# Patient Record
Sex: FEMALE | Race: WHITE | NOT HISPANIC OR LATINO | Employment: OTHER | URBAN - NONMETROPOLITAN AREA
[De-identification: names, ages, dates, MRNs, and addresses within clinical notes are randomized per-mention and may not be internally consistent; named-entity substitution may affect disease eponyms.]

---

## 2017-09-18 ENCOUNTER — TRANSCRIBE ORDERS (OUTPATIENT)
Dept: ADMINISTRATIVE | Facility: HOSPITAL | Age: 57
End: 2017-09-18

## 2017-09-18 DIAGNOSIS — Z12.31 ENCOUNTER FOR SCREENING MAMMOGRAM FOR MALIGNANT NEOPLASM OF BREAST: Primary | ICD-10-CM

## 2017-09-22 ENCOUNTER — HOSPITAL ENCOUNTER (OUTPATIENT)
Dept: MAMMOGRAPHY | Facility: HOSPITAL | Age: 57
Discharge: HOME | End: 2017-09-22
Attending: OBSTETRICS & GYNECOLOGY | Admitting: OBSTETRICS & GYNECOLOGY

## 2017-09-22 DIAGNOSIS — Z12.31 ENCOUNTER FOR SCREENING MAMMOGRAM FOR MALIGNANT NEOPLASM OF BREAST: ICD-10-CM

## 2017-09-22 PROCEDURE — 77063 BREAST TOMOSYNTHESIS BI: CPT

## 2017-09-22 PROCEDURE — G0202 SCR MAMMO BI INCL CAD: HCPCS

## 2018-07-30 ENCOUNTER — TRANSCRIBE ORDERS (OUTPATIENT)
Dept: ADMINISTRATIVE | Facility: HOSPITAL | Age: 58
End: 2018-07-30

## 2018-07-30 DIAGNOSIS — Z12.31 ENCOUNTER FOR SCREENING MAMMOGRAM FOR MALIGNANT NEOPLASM OF BREAST: Primary | ICD-10-CM

## 2018-09-28 ENCOUNTER — APPOINTMENT (OUTPATIENT)
Dept: MAMMOGRAPHY | Facility: HOSPITAL | Age: 58
End: 2018-09-28
Attending: OBSTETRICS & GYNECOLOGY

## 2018-09-28 ENCOUNTER — HOSPITAL ENCOUNTER (OUTPATIENT)
Dept: MAMMOGRAPHY | Facility: HOSPITAL | Age: 58
Discharge: HOME | End: 2018-09-28
Attending: OBSTETRICS & GYNECOLOGY | Admitting: OBSTETRICS & GYNECOLOGY

## 2018-09-28 DIAGNOSIS — Z12.31 ENCOUNTER FOR SCREENING MAMMOGRAM FOR MALIGNANT NEOPLASM OF BREAST: ICD-10-CM

## 2018-09-28 PROCEDURE — 77063 BREAST TOMOSYNTHESIS BI: CPT

## 2018-09-28 PROCEDURE — 77067 SCR MAMMO BI INCL CAD: CPT

## 2019-06-17 ENCOUNTER — TRANSCRIBE ORDERS (OUTPATIENT)
Dept: ADMINISTRATIVE | Facility: HOSPITAL | Age: 59
End: 2019-06-17

## 2019-06-17 DIAGNOSIS — Z12.39 SCREENING BREAST EXAMINATION: Primary | ICD-10-CM

## 2019-07-29 ENCOUNTER — TRANSCRIBE ORDERS (OUTPATIENT)
Dept: ADMINISTRATIVE | Facility: HOSPITAL | Age: 59
End: 2019-07-29

## 2019-07-29 DIAGNOSIS — Z13.820 SCREENING FOR OSTEOPOROSIS: Primary | ICD-10-CM

## 2019-10-04 ENCOUNTER — APPOINTMENT (OUTPATIENT)
Dept: MAMMOGRAPHY | Facility: HOSPITAL | Age: 59
End: 2019-10-04

## 2019-10-04 ENCOUNTER — HOSPITAL ENCOUNTER (OUTPATIENT)
Dept: MAMMOGRAPHY | Facility: HOSPITAL | Age: 59
Discharge: HOME | End: 2019-10-04
Admitting: OBSTETRICS & GYNECOLOGY

## 2019-10-04 DIAGNOSIS — Z12.39 SCREENING BREAST EXAMINATION: ICD-10-CM

## 2019-10-04 PROCEDURE — 77067 SCR MAMMO BI INCL CAD: CPT

## 2020-10-20 ENCOUNTER — TRANSCRIBE ORDERS (OUTPATIENT)
Dept: ADMINISTRATIVE | Facility: HOSPITAL | Age: 60
End: 2020-10-20

## 2020-10-20 DIAGNOSIS — Z12.31 SCREENING MAMMOGRAM, ENCOUNTER FOR: Primary | ICD-10-CM

## 2020-10-23 ENCOUNTER — OFFICE VISIT (OUTPATIENT)
Dept: OBSTETRICS AND GYNECOLOGY | Facility: CLINIC | Age: 60
End: 2020-10-23

## 2020-10-23 VITALS
WEIGHT: 180 LBS | DIASTOLIC BLOOD PRESSURE: 80 MMHG | BODY MASS INDEX: 29.99 KG/M2 | HEIGHT: 65 IN | SYSTOLIC BLOOD PRESSURE: 130 MMHG

## 2020-10-23 DIAGNOSIS — Z78.9 NONSMOKER: ICD-10-CM

## 2020-10-23 DIAGNOSIS — N39.3 SUI (STRESS URINARY INCONTINENCE, FEMALE): ICD-10-CM

## 2020-10-23 DIAGNOSIS — Z01.419 ENCOUNTER FOR GYNECOLOGICAL EXAMINATION WITHOUT ABNORMAL FINDING: Primary | ICD-10-CM

## 2020-10-23 DIAGNOSIS — E66.9 OBESITY (BMI 30-39.9): ICD-10-CM

## 2020-10-23 DIAGNOSIS — Z13.820 OSTEOPOROSIS SCREENING: ICD-10-CM

## 2020-10-23 DIAGNOSIS — N95.0 PMB (POSTMENOPAUSAL BLEEDING): ICD-10-CM

## 2020-10-23 DIAGNOSIS — Z12.31 BREAST CANCER SCREENING BY MAMMOGRAM: ICD-10-CM

## 2020-10-23 PROCEDURE — 99386 PREV VISIT NEW AGE 40-64: CPT | Performed by: OBSTETRICS & GYNECOLOGY

## 2020-10-23 PROCEDURE — G0123 SCREEN CERV/VAG THIN LAYER: HCPCS | Performed by: OBSTETRICS & GYNECOLOGY

## 2020-10-23 RX ORDER — DIPHENHYDRAMINE HCL 25 MG
25 CAPSULE ORAL AS NEEDED
COMMUNITY

## 2020-10-23 RX ORDER — MECLIZINE HYDROCHLORIDE 25 MG/1
25 TABLET ORAL
COMMUNITY
End: 2022-08-25 | Stop reason: SDUPTHER

## 2020-10-23 RX ORDER — IBUPROFEN 200 MG
400 TABLET ORAL
COMMUNITY
End: 2021-07-22

## 2020-10-23 RX ORDER — HYDROCHLOROTHIAZIDE 12.5 MG/1
CAPSULE, GELATIN COATED ORAL
COMMUNITY
Start: 2020-05-26 | End: 2022-04-19 | Stop reason: SDUPTHER

## 2020-10-23 RX ORDER — LORAZEPAM 1 MG/1
TABLET ORAL
COMMUNITY
Start: 2020-08-31 | End: 2021-04-20 | Stop reason: SDUPTHER

## 2020-10-23 RX ORDER — CYCLOBENZAPRINE HCL 5 MG
5 TABLET ORAL
COMMUNITY
Start: 2019-11-19 | End: 2020-11-19

## 2020-10-23 RX ORDER — CHOLECALCIFEROL (VITAMIN D3) 125 MCG
10 CAPSULE ORAL NIGHTLY
COMMUNITY

## 2020-10-23 RX ORDER — CETIRIZINE HYDROCHLORIDE 10 MG/1
10 TABLET ORAL DAILY
COMMUNITY

## 2020-10-23 NOTE — PROGRESS NOTES
Subjective      Radha Wolff is a 59 y.o. female who presents for her routine annual exam. Patient has never actually made it 12 months without bleeding - she says that every time she hits the 11 month owen, she has had a day of bleeding.  Patient reports that [facility] had done two biopsies on her.  Last month, patient had one day of dark brown discharge, again at 11 months from the last episode of bleeding.  No hot flashes/night sweats.    Regular self breast exam: yes  History of abnormal Pap smear: no  History of abnormal mammogram: no  Exercise: frequently    Menstrual History:  OB History        1    Para   0    Term   0            AB   1    Living           SAB        TAB        Ectopic        Molar        Multiple        Live Births                   No LMP recorded. Patient is postmenopausal.       The following portions of the patient's history were reviewed and updated as appropriate: allergies, current medications, past family history, past medical history, past social history, past surgical history and problem list.    heterosexual    Family History  Family History   Problem Relation Age of Onset   • Lung cancer Mother    • Lung cancer Father    • Obesity Brother    • Heart disease Brother    • Arthritis Brother    • No Known Problems Maternal Grandmother    • No Known Problems Paternal Grandmother    • No Known Problems Maternal Aunt    • No Known Problems Paternal Aunt    • BRCA 1/2 Neg Hx    • Breast cancer Neg Hx    • Colon cancer Neg Hx    • Endometrial cancer Neg Hx    • Ovarian cancer Neg Hx        Review of Systems  Review of Systems   Constitutional: Negative for activity change and unexpected weight loss (Pt has intentionally lost weight since retiring from her job).   HENT: Negative for congestion.    Respiratory: Negative for shortness of breath.    Cardiovascular: Negative for chest pain.   Gastrointestinal: Positive for constipation and diarrhea. Negative for abdominal pain  "and blood in stool.   Endocrine: Positive for cold intolerance (cold natured). Negative for heat intolerance.   Genitourinary: Positive for urinary incontinence (BRET) and vaginal bleeding (dark brown discharge one day last month). Negative for breast pain, decreased libido, difficulty urinating, dyspareunia, pelvic pain, vaginal discharge and vaginal pain.   Musculoskeletal: Positive for arthralgias. Negative for back pain, neck pain and neck stiffness.   Skin: Negative for rash.   Neurological: Positive for dizziness (inner ear, chronic intermittent). Negative for headache.   Psychiatric/Behavioral: Positive for sleep disturbance ( ativan and melatonin). Negative for depressed mood. The patient is not nervous/anxious.              Objective        /80   Ht 165.1 cm (65\")   Wt 81.6 kg (180 lb)   Breastfeeding No   BMI 29.95 kg/m²   Physical Exam  Vitals signs and nursing note reviewed. Exam conducted with a chaperone present.   Constitutional:       General: She is not in acute distress.     Appearance: She is well-developed.   HENT:      Head: Normocephalic and atraumatic.   Neck:      Musculoskeletal: Normal range of motion and neck supple.   Cardiovascular:      Rate and Rhythm: Normal rate and regular rhythm.      Heart sounds: No murmur.   Pulmonary:      Effort: Pulmonary effort is normal.      Breath sounds: Normal breath sounds.   Chest:      Breasts:         Right: No inverted nipple or mass.         Left: No inverted nipple or mass.   Abdominal:      General: There is no distension.      Palpations: Abdomen is soft.      Tenderness: There is no abdominal tenderness.   Genitourinary:     General: Normal vulva.      Exam position: Lithotomy position.      Labia:         Right: No tenderness or lesion.         Left: No tenderness or lesion.       Urethra: No prolapse.      Vagina: Normal. No vaginal discharge, tenderness or bleeding.      Cervix: No cervical motion tenderness, discharge or " friability.      Adnexa:         Right: No tenderness or fullness.          Left: No tenderness or fullness.        Rectum: No external hemorrhoid or internal hemorrhoid. Normal anal tone.   Musculoskeletal: Normal range of motion.   Skin:     General: Skin is warm and dry.   Neurological:      Mental Status: She is alert and oriented to person, place, and time.   Psychiatric:         Behavior: Behavior normal.         Judgment: Judgment normal.               Assessment  & Plan    Diagnoses and all orders for this visit:    1. Encounter for gynecological examination without abnormal finding (Primary): Exam unremarkable.  Patient reports regular SBE and regular exercise.  She has no h/o abnormal PAP or mammogram.  Mammogram and DEXA both ordered.  New PAP collected.  -     Liquid-based Pap Smear, Screening  -     HPV DNA Probe, Direct - ThinPrep Vial, Cervix    2. Obesity (BMI 30-39.9): Patient reports regular exercise.    3. Nonsmoker    4. BRET (stress urinary incontinence, female)  Comments:  Not bothered enough that she wants to do PT    5. Osteoporosis screening  -     DEXA Bone Density Axial; Future    6. Breast cancer screening by mammogram  -     Mammo Screening Bilateral With CAD; Future    7. PMB (postmenopausal bleeding): RTO in 2-3 weeks with pelvic u/s.  Possible biopsy vs D&C at that time, depending on u/s findings  -     Estradiol  -     Follicle Stimulating Hormone  -     Luteinizing Hormone  -     Progesterone        This note was electronically signed.    Kalani Newsome MD  10/23/2020  10:13 CDT

## 2020-10-24 LAB
ESTRADIOL SERPL-MCNC: <5 PG/ML
FSH SERPL-ACNC: 99.9 MIU/ML
LH SERPL-ACNC: 53.6 MIU/ML
PROGEST SERPL-MCNC: <0.1 NG/ML

## 2020-10-26 PROCEDURE — 87624 HPV HI-RISK TYP POOLED RSLT: CPT | Performed by: OBSTETRICS & GYNECOLOGY

## 2020-10-27 ENCOUNTER — TELEPHONE (OUTPATIENT)
Dept: OBSTETRICS AND GYNECOLOGY | Facility: CLINIC | Age: 60
End: 2020-10-27

## 2020-10-28 LAB
HPV I/H RISK 4 DNA CVX QL PROBE+SIG AMP: NOT DETECTED
LAB AP CASE REPORT: NORMAL
LAB AP GYN ADDITIONAL INFORMATION: NORMAL
PATH INTERP SPEC-IMP: NORMAL
STAT OF ADQ CVX/VAG CYTO-IMP: NORMAL

## 2020-10-28 NOTE — TELEPHONE ENCOUNTER
Informed pt of labs consistent with menopausal state, but pt wants to know Pap results. Please review.

## 2020-10-28 NOTE — TELEPHONE ENCOUNTER
It just came back this morning.  You can reassure her that PAP was normal, with a negative HPV.  Thx

## 2020-10-30 ENCOUNTER — APPOINTMENT (OUTPATIENT)
Dept: BONE DENSITY | Facility: HOSPITAL | Age: 60
End: 2020-10-30

## 2020-10-30 ENCOUNTER — APPOINTMENT (OUTPATIENT)
Dept: MAMMOGRAPHY | Facility: HOSPITAL | Age: 60
End: 2020-10-30

## 2020-11-05 ENCOUNTER — HOSPITAL ENCOUNTER (OUTPATIENT)
Dept: BONE DENSITY | Facility: HOSPITAL | Age: 60
Discharge: HOME | End: 2020-11-05

## 2020-11-05 ENCOUNTER — HOSPITAL ENCOUNTER (OUTPATIENT)
Dept: MAMMOGRAPHY | Facility: HOSPITAL | Age: 60
Discharge: HOME | End: 2020-11-05

## 2020-11-05 DIAGNOSIS — Z13.820 OSTEOPOROSIS SCREENING: ICD-10-CM

## 2020-11-05 PROCEDURE — 77063 BREAST TOMOSYNTHESIS BI: CPT

## 2020-11-05 PROCEDURE — 77080 DXA BONE DENSITY AXIAL: CPT

## 2020-11-05 PROCEDURE — 77067 SCR MAMMO BI INCL CAD: CPT

## 2020-11-08 DIAGNOSIS — R92.8 ABNORMAL MAMMOGRAM: Primary | ICD-10-CM

## 2020-11-09 ENCOUNTER — HOSPITAL ENCOUNTER (OUTPATIENT)
Dept: MAMMOGRAPHY | Facility: HOSPITAL | Age: 60
Discharge: HOME | End: 2020-11-09
Admitting: OBSTETRICS & GYNECOLOGY

## 2020-11-09 DIAGNOSIS — R92.8 ABNORMAL MAMMOGRAM: ICD-10-CM

## 2020-11-09 PROCEDURE — 77065 DX MAMMO INCL CAD UNI: CPT

## 2020-11-09 PROCEDURE — G0279 TOMOSYNTHESIS, MAMMO: HCPCS

## 2020-11-11 ENCOUNTER — OFFICE VISIT (OUTPATIENT)
Dept: OBSTETRICS AND GYNECOLOGY | Facility: CLINIC | Age: 60
End: 2020-11-11

## 2020-11-11 VITALS
BODY MASS INDEX: 31.16 KG/M2 | WEIGHT: 187 LBS | DIASTOLIC BLOOD PRESSURE: 84 MMHG | HEIGHT: 65 IN | SYSTOLIC BLOOD PRESSURE: 118 MMHG

## 2020-11-11 DIAGNOSIS — R92.8 ABNORMAL MAMMOGRAM: Primary | ICD-10-CM

## 2020-11-11 DIAGNOSIS — N95.0 PMB (POSTMENOPAUSAL BLEEDING): Primary | ICD-10-CM

## 2020-11-11 DIAGNOSIS — Z78.9 NONSMOKER: ICD-10-CM

## 2020-11-11 DIAGNOSIS — R92.1 BREAST CALCIFICATION SEEN ON MAMMOGRAM: ICD-10-CM

## 2020-11-11 DIAGNOSIS — R93.89 THICKENED ENDOMETRIUM: ICD-10-CM

## 2020-11-11 PROCEDURE — 58100 BIOPSY OF UTERUS LINING: CPT | Performed by: OBSTETRICS & GYNECOLOGY

## 2020-11-11 PROCEDURE — 88305 TISSUE EXAM BY PATHOLOGIST: CPT | Performed by: OBSTETRICS & GYNECOLOGY

## 2020-11-11 NOTE — PROGRESS NOTES
"Subjective   Chief Complaint   Patient presents with   • Vaginal Bleeding     US today for one episode of vaginal bleeding. pt states she has not had bleeding since.      Radha Wolff is a 59 y.o. year old .  No LMP recorded. Patient is postmenopausal.  She presents to be seen to follow-up PMB.  Patient was reporting at her annual exam that she had episodes of bleeding \"about once per year\", but she was still wondering whether or not she was completely through with menopause.  Labs were done that day and all hormone levels were consistent with menopause.  Patient recalls that Dr. Lee did endometrial biopsies in her office on two different occasions, and she does not remember it being a terrible experience.     The following portions of the patient's history were reviewed and updated as appropriate:current medications and allergies    Social History    Tobacco Use      Smoking status: Never Smoker    Review of Systems   Constitutional: Negative for activity change and unexpected weight change.   Respiratory: Negative for shortness of breath.    Cardiovascular: Negative for chest pain.   Gastrointestinal: Negative for abdominal pain.   Genitourinary: Positive for vaginal bleeding (recently). Negative for pelvic pain.         Objective   /84 (BP Location: Left arm, Patient Position: Sitting)   Ht 165.1 cm (65\")   Wt 84.8 kg (187 lb)   BMI 31.12 kg/m²     Physical Exam  Vitals signs and nursing note reviewed.   Constitutional:       General: She is not in acute distress.     Appearance: She is well-developed.   HENT:      Head: Normocephalic and atraumatic.   Neck:      Musculoskeletal: Normal range of motion.      Thyroid: No thyromegaly.   Pulmonary:      Effort: Pulmonary effort is normal.   Abdominal:      General: There is no distension.      Palpations: Abdomen is soft.      Tenderness: There is no abdominal tenderness.   Genitourinary:     Comments:  exam normal.  Cx normal in appearance. "  Cervix washed with Betadine and then grasped on the anterior lip with a single-tooth tenaculum.  The endometrial biopsy Pipelle was easily passed into the patient's endometrial cavity; 2 passes were made to obtain adequate specimen.  The patient tolerated well.  The tenaculum site was hemostatic upon removal.  Musculoskeletal: Normal range of motion.   Skin:     General: Skin is warm and dry.   Neurological:      Mental Status: She is alert and oriented to person, place, and time.   Psychiatric:         Behavior: Behavior normal.         Judgment: Judgment normal.         Lab Review   FSH and estradiol and progesterone    Imaging   Pelvic ultrasound report Uterus 5.8 X 4.3 X 3.6, with .56 cm endo lining.  Ovaries normal, bilaterally    Assessment & Plan    Diagnoses and all orders for this visit:    1. PMB (postmenopausal bleeding) (Primary): Recent episode lasted one day.  Patient tolerated endometrial biopsy today well    2. Breast calcification seen on mammogram: Repeat mammogram already ordered for 6 months from now.  Patient is very concerned; she was offered a consultation with general surgery, so they could discuss a biopsy.  After thinking about it, the patient decided she would just wait and have a repeat mammogram in 6 months  Comments:  bilateral.  Seen in 2020    3. Thickened endometrium: 5.6 mm on ultrasound today    4. Nonsmoker    5. BMI 31.0-31.9,adult    Other orders  -     Tissue Pathology Exam      This note was electronically signed.    Kalani Newsome MD  November 11, 2020  10:54 CST

## 2020-11-13 LAB
LAB AP CASE REPORT: NORMAL
LAB AP CLINICAL INFORMATION: NORMAL
PATH REPORT.FINAL DX SPEC: NORMAL
PATH REPORT.GROSS SPEC: NORMAL

## 2021-04-05 ENCOUNTER — HOSPITAL ENCOUNTER (EMERGENCY)
Facility: HOSPITAL | Age: 61
Discharge: HOME | End: 2021-04-05
Attending: EMERGENCY MEDICINE | Admitting: EMERGENCY MEDICINE

## 2021-04-05 ENCOUNTER — APPOINTMENT (OUTPATIENT)
Dept: CT IMAGING | Facility: HOSPITAL | Age: 61
End: 2021-04-05

## 2021-04-05 VITALS
SYSTOLIC BLOOD PRESSURE: 131 MMHG | OXYGEN SATURATION: 99 % | RESPIRATION RATE: 18 BRPM | HEIGHT: 65 IN | DIASTOLIC BLOOD PRESSURE: 74 MMHG | BODY MASS INDEX: 32.32 KG/M2 | HEART RATE: 70 BPM | WEIGHT: 194 LBS | TEMPERATURE: 98.4 F

## 2021-04-05 DIAGNOSIS — R10.13 EPIGASTRIC PAIN: Primary | ICD-10-CM

## 2021-04-05 LAB
ALBUMIN SERPL-MCNC: 3.8 G/DL (ref 3.5–5.2)
ALBUMIN/GLOB SERPL: 1.5 G/DL
ALP SERPL-CCNC: 70 U/L (ref 39–117)
ALT SERPL W P-5'-P-CCNC: 12 U/L (ref 1–33)
ANION GAP SERPL CALCULATED.3IONS-SCNC: 9 MMOL/L (ref 5–15)
AST SERPL-CCNC: 15 U/L (ref 1–32)
BACTERIA UR QL AUTO: ABNORMAL /HPF
BASOPHILS # BLD AUTO: 0.04 10*3/MM3 (ref 0–0.2)
BASOPHILS NFR BLD AUTO: 0.5 % (ref 0–1.5)
BILIRUB SERPL-MCNC: 0.4 MG/DL (ref 0–1.2)
BILIRUB UR QL STRIP: ABNORMAL
BUN SERPL-MCNC: 15 MG/DL (ref 6–20)
BUN/CREAT SERPL: 20.5 (ref 7–25)
CALCIUM SPEC-SCNC: 9.5 MG/DL (ref 8.6–10.5)
CHLORIDE SERPL-SCNC: 106 MMOL/L (ref 98–107)
CLARITY UR: ABNORMAL
CO2 SERPL-SCNC: 28 MMOL/L (ref 22–29)
COLOR UR: ABNORMAL
CREAT SERPL-MCNC: 0.73 MG/DL (ref 0.57–1)
DEPRECATED RDW RBC AUTO: 41.1 FL (ref 37–54)
EOSINOPHIL # BLD AUTO: 0.22 10*3/MM3 (ref 0–0.4)
EOSINOPHIL NFR BLD AUTO: 3 % (ref 0.3–6.2)
ERYTHROCYTE [DISTWIDTH] IN BLOOD BY AUTOMATED COUNT: 12.6 % (ref 12.3–15.4)
GFR SERPL CREATININE-BSD FRML MDRD: 82 ML/MIN/1.73
GLOBULIN UR ELPH-MCNC: 2.6 GM/DL
GLUCOSE SERPL-MCNC: 104 MG/DL (ref 65–99)
GLUCOSE UR STRIP-MCNC: NEGATIVE MG/DL
HBA1C MFR BLD: 5.6 % (ref 4.8–5.6)
HCT VFR BLD AUTO: 42.9 % (ref 34–46.6)
HGB BLD-MCNC: 14.3 G/DL (ref 12–15.9)
HGB UR QL STRIP.AUTO: ABNORMAL
IMM GRANULOCYTES # BLD AUTO: 0.02 10*3/MM3 (ref 0–0.05)
IMM GRANULOCYTES NFR BLD AUTO: 0.3 % (ref 0–0.5)
KETONES UR QL STRIP: ABNORMAL
LEUKOCYTE ESTERASE UR QL STRIP.AUTO: ABNORMAL
LIPASE SERPL-CCNC: 27 U/L (ref 13–60)
LYMPHOCYTES # BLD AUTO: 2.05 10*3/MM3 (ref 0.7–3.1)
LYMPHOCYTES NFR BLD AUTO: 28.1 % (ref 19.6–45.3)
MCH RBC QN AUTO: 29.7 PG (ref 26.6–33)
MCHC RBC AUTO-ENTMCNC: 33.3 G/DL (ref 31.5–35.7)
MCV RBC AUTO: 89 FL (ref 79–97)
MONOCYTES # BLD AUTO: 0.49 10*3/MM3 (ref 0.1–0.9)
MONOCYTES NFR BLD AUTO: 6.7 % (ref 5–12)
NEUTROPHILS NFR BLD AUTO: 4.47 10*3/MM3 (ref 1.7–7)
NEUTROPHILS NFR BLD AUTO: 61.4 % (ref 42.7–76)
NITRITE UR QL STRIP: NEGATIVE
NRBC BLD AUTO-RTO: 0 /100 WBC (ref 0–0.2)
PH UR STRIP.AUTO: 5.5 [PH] (ref 5–8)
PLATELET # BLD AUTO: 324 10*3/MM3 (ref 140–450)
PMV BLD AUTO: 10.8 FL (ref 6–12)
POTASSIUM SERPL-SCNC: 4 MMOL/L (ref 3.5–5.2)
PROT SERPL-MCNC: 6.4 G/DL (ref 6–8.5)
PROT UR QL STRIP: ABNORMAL
RBC # BLD AUTO: 4.82 10*6/MM3 (ref 3.77–5.28)
RBC # UR: ABNORMAL /HPF
REF LAB TEST METHOD: ABNORMAL
SODIUM SERPL-SCNC: 143 MMOL/L (ref 136–145)
SP GR UR STRIP: 1.03 (ref 1–1.03)
SQUAMOUS #/AREA URNS HPF: ABNORMAL /HPF
UROBILINOGEN UR QL STRIP: ABNORMAL
WBC # BLD AUTO: 7.29 10*3/MM3 (ref 3.4–10.8)
WBC UR QL AUTO: ABNORMAL /HPF

## 2021-04-05 PROCEDURE — 99283 EMERGENCY DEPT VISIT LOW MDM: CPT

## 2021-04-05 PROCEDURE — 85025 COMPLETE CBC W/AUTO DIFF WBC: CPT | Performed by: EMERGENCY MEDICINE

## 2021-04-05 PROCEDURE — 81001 URINALYSIS AUTO W/SCOPE: CPT | Performed by: EMERGENCY MEDICINE

## 2021-04-05 PROCEDURE — 25010000002 IOPAMIDOL 61 % SOLUTION: Performed by: EMERGENCY MEDICINE

## 2021-04-05 PROCEDURE — 83690 ASSAY OF LIPASE: CPT | Performed by: EMERGENCY MEDICINE

## 2021-04-05 PROCEDURE — 87086 URINE CULTURE/COLONY COUNT: CPT | Performed by: EMERGENCY MEDICINE

## 2021-04-05 PROCEDURE — 74177 CT ABD & PELVIS W/CONTRAST: CPT

## 2021-04-05 PROCEDURE — 80053 COMPREHEN METABOLIC PANEL: CPT | Performed by: EMERGENCY MEDICINE

## 2021-04-05 PROCEDURE — 83036 HEMOGLOBIN GLYCOSYLATED A1C: CPT | Performed by: EMERGENCY MEDICINE

## 2021-04-05 RX ORDER — SULFAMETHOXAZOLE AND TRIMETHOPRIM 800; 160 MG/1; MG/1
1 TABLET ORAL 2 TIMES DAILY
Qty: 20 TABLET | Refills: 0 | Status: SHIPPED | OUTPATIENT
Start: 2021-04-05 | End: 2021-05-10

## 2021-04-05 RX ORDER — OMEPRAZOLE 20 MG/1
20 CAPSULE, DELAYED RELEASE ORAL 2 TIMES DAILY
Qty: 30 CAPSULE | Refills: 0 | Status: SHIPPED | OUTPATIENT
Start: 2021-04-05 | End: 2021-05-10

## 2021-04-05 RX ORDER — SODIUM CHLORIDE 0.9 % (FLUSH) 0.9 %
10 SYRINGE (ML) INJECTION AS NEEDED
Status: DISCONTINUED | OUTPATIENT
Start: 2021-04-05 | End: 2021-04-05 | Stop reason: HOSPADM

## 2021-04-05 RX ADMIN — IOPAMIDOL 100 ML: 612 INJECTION, SOLUTION INTRAVENOUS at 11:25

## 2021-04-05 NOTE — ED PROVIDER NOTES
Subjective   Patient complains of midepigastric abdominal pain.  She says the first episode was on a Friday night about 2 weeks ago.  This lasted several hours then went away.  This past Saturday night which would be 2 days ago it hit her again.  She described it as a burning pain that was so severe she did not feel like she was well enough to even come to the emergency room to get checked out.  A gradual went away after about 6 hours.  She is concerned because she has a history of having had her gallbladder out in 2007 and had a pancreatitis related to that at that time.  She actually tried to call GI today to get an appointment but they would not see her because she did not have a primary care referral.  She has an appointment with Dr. Jimenez tomorrow as a new physician work-up.  She came to the ER today hoping that we could get her into the GI doctors.  She is not hurting at the present time.      History provided by:  Patient   used: No    Abdominal Pain  Pain location:  Epigastric  Pain quality: burning    Pain radiates to:  Does not radiate  Pain severity:  Severe  Onset quality:  Sudden  Duration:  6 hours  Timing:  Constant  Progression:  Resolved  Chronicity:  Recurrent  Context: not alcohol use, not awakening from sleep, not diet changes, not eating, not laxative use, not medication withdrawal, not previous surgeries, not recent illness, not recent sexual activity, not recent travel, not retching, not sick contacts, not suspicious food intake and not trauma    Relieved by:  Nothing  Worsened by:  Nothing  Ineffective treatments:  None tried  Associated symptoms: no anorexia, no belching, no chest pain, no chills, no constipation, no cough, no diarrhea, no dysuria, no fatigue, no fever, no flatus, no hematemesis, no hematochezia, no hematuria, no melena, no nausea, no shortness of breath, no sore throat, no vaginal bleeding, no vaginal discharge and no vomiting    Risk factors: no  alcohol abuse, no aspirin use, not elderly, has not had multiple surgeries, no NSAID use, not obese, not pregnant and no recent hospitalization        Review of Systems   Constitutional: Negative.  Negative for chills, fatigue and fever.   HENT: Negative.  Negative for sore throat.    Respiratory: Negative.  Negative for cough and shortness of breath.    Cardiovascular: Negative.  Negative for chest pain.   Gastrointestinal: Positive for abdominal pain. Negative for anorexia, constipation, diarrhea, flatus, hematemesis, hematochezia, melena, nausea and vomiting.   Genitourinary: Negative.  Negative for dysuria, hematuria, vaginal bleeding and vaginal discharge.   Musculoskeletal: Negative.    Skin: Negative.    Neurological: Negative.    Psychiatric/Behavioral: Negative.    All other systems reviewed and are negative.      Past Medical History:   Diagnosis Date   • Anxiety    • Dizzy spells    • PMB (postmenopausal bleeding)        Allergies   Allergen Reactions   • Bee Venom Nausea And Vomiting   • Latex Hives     01/18/2005-Latex unspecified     • Levofloxacin Delirium   • Red Dye Itching     06/22/2006-Skin itch     • Tetracycline Other (See Comments)     01/18/2005-Tetracycline intolerant     • Adhesive Tape Rash   • Wasp Venom Rash       Past Surgical History:   Procedure Laterality Date   • BILATERAL INSERTION OF EAR TUBES AND ADENOIDECTOMY     • LAPAROSCOPIC CHOLECYSTECTOMY     • TONSILLECTOMY         Family History   Problem Relation Age of Onset   • Lung cancer Mother    • Lung cancer Father    • Obesity Brother    • Heart disease Brother    • Arthritis Brother    • No Known Problems Maternal Grandmother    • No Known Problems Paternal Grandmother    • No Known Problems Maternal Aunt    • No Known Problems Paternal Aunt    • BRCA 1/2 Neg Hx    • Breast cancer Neg Hx    • Colon cancer Neg Hx    • Endometrial cancer Neg Hx    • Ovarian cancer Neg Hx        Social History     Socioeconomic History   • Marital  status: Single     Spouse name: Not on file   • Number of children: Not on file   • Years of education: Not on file   • Highest education level: Not on file   Tobacco Use   • Smoking status: Never Smoker   Substance and Sexual Activity   • Alcohol use: Not Currently   • Drug use: Never   • Sexual activity: Yes     Partners: Male       Prior to Admission medications    Medication Sig Start Date End Date Taking? Authorizing Provider   cetirizine (zyrTEC) 10 MG tablet Take 10 mg by mouth Daily.   Yes Jackson Whitten MD   diphenhydrAMINE (BENADRYL) 25 mg capsule Take 25 mg by mouth.   Yes Jackson Whitten MD   hydroCHLOROthiazide (MICROZIDE) 12.5 MG capsule TAKE 1 CAPSULE DAILY 5/26/20  Yes Jackson Whitten MD   ibuprofen (ADVIL,MOTRIN) 200 MG tablet Take 400 mg by mouth.   Yes Jackson Whitten MD   LORazepam (ATIVAN) 1 MG tablet TAKE ONE TABLET THREE TIMES DAILY AS NEEDED ANXIETY (MAY CAUSE DROWSINESS) GENERIC FOR ATIVAN 8/31/20  Yes Jackson Whitten MD   meclizine (ANTIVERT) 25 MG tablet Take 25 mg by mouth.   Yes Jackson Whitten MD   melatonin 5 MG tablet tablet Take 10 mg by mouth Every Night.   Yes Jackson Whitten MD       Medications   sodium chloride 0.9 % flush 10 mL (has no administration in time range)   iopamidol (ISOVUE-300) 61 % injection 100 mL (100 mL Intravenous Given 4/5/21 1125)       Vitals:    04/05/21 1253   BP: 131/74   Pulse: 70   Resp: 18   Temp:    SpO2: 99%         Objective   Physical Exam  Vitals and nursing note reviewed.   Constitutional:       Appearance: She is well-developed.   HENT:      Head: Normocephalic and atraumatic.   Cardiovascular:      Rate and Rhythm: Normal rate and regular rhythm.   Pulmonary:      Effort: Pulmonary effort is normal.      Breath sounds: Normal breath sounds.   Abdominal:      General: Abdomen is flat.      Palpations: Abdomen is soft.      Tenderness: There is no abdominal tenderness.   Skin:     General: Skin is warm  and dry.   Neurological:      General: No focal deficit present.      Mental Status: She is alert and oriented to person, place, and time.   Psychiatric:         Mood and Affect: Mood normal.         Behavior: Behavior normal.         Procedures         Lab Results (last 24 hours)     Procedure Component Value Units Date/Time    CBC & Differential [[id number]]  (Normal) Collected: 04/05/21 0943    Specimen: Blood Updated: 04/05/21 0953    Narrative:      The following orders were created for panel order CBC & Differential.  Procedure                               Abnormality         Status                     ---------                               -----------         ------                     CBC Auto Differential[[id number]]        Normal              Final result                 Please view results for these tests on the individual orders.    Lipase [[id number]]  (Normal) Collected: 04/05/21 0943    Specimen: Blood Updated: 04/05/21 1005     Lipase 27 U/L     CBC Auto Differential [[id number]]  (Normal) Collected: 04/05/21 0943    Specimen: Blood Updated: 04/05/21 0953     WBC 7.29 10*3/mm3      RBC 4.82 10*6/mm3      Hemoglobin 14.3 g/dL      Hematocrit 42.9 %      MCV 89.0 fL      MCH 29.7 pg      MCHC 33.3 g/dL      RDW 12.6 %      RDW-SD 41.1 fl      MPV 10.8 fL      Platelets 324 10*3/mm3      Neutrophil % 61.4 %      Lymphocyte % 28.1 %      Monocyte % 6.7 %      Eosinophil % 3.0 %      Basophil % 0.5 %      Immature Grans % 0.3 %      Neutrophils, Absolute 4.47 10*3/mm3      Lymphocytes, Absolute 2.05 10*3/mm3      Monocytes, Absolute 0.49 10*3/mm3      Eosinophils, Absolute 0.22 10*3/mm3      Basophils, Absolute 0.04 10*3/mm3      Immature Grans, Absolute 0.02 10*3/mm3      nRBC 0.0 /100 WBC     Hemoglobin A1c [[id number]]  (Normal) Collected: 04/05/21 0943    Specimen: Blood Updated: 04/05/21 1308     Hemoglobin A1C 5.60 %     Narrative:      Hemoglobin A1C Ranges:    Increased Risk for Diabetes  5.7% to  6.4%  Diabetes                     >= 6.5%  Diabetic Goal                < 7.0%    Urinalysis With Culture If Indicated - Urine, Clean Catch [[id number]]  (Abnormal) Collected: 04/05/21 0954    Specimen: Urine, Clean Catch Updated: 04/05/21 1028     Color, UA Dark Yellow     Appearance, UA Cloudy     pH, UA 5.5     Specific Gravity, UA 1.026     Glucose, UA Negative     Ketones, UA Trace     Bilirubin, UA Small (1+)     Blood, UA Trace     Protein, UA Trace     Leuk Esterase, UA Moderate (2+)     Nitrite, UA Negative     Urobilinogen, UA 1.0 E.U./dL    Urinalysis, Microscopic Only - Urine, Clean Catch [[id number]]  (Abnormal) Collected: 04/05/21 0954    Specimen: Urine, Clean Catch Updated: 04/05/21 1021     RBC, UA 0-2 /HPF      WBC, UA 13-20 /HPF      Bacteria, UA 2+ /HPF      Squamous Epithelial Cells, UA 7-12 /HPF      Methodology Automated Microscopy    Urine Culture - Urine, Urine, Clean Catch [[id number]] Collected: 04/05/21 0954    Specimen: Urine, Clean Catch Updated: 04/05/21 1021    Comprehensive Metabolic Panel [[id number]]  (Abnormal) Collected: 04/05/21 1033    Specimen: Blood Updated: 04/05/21 1110     Glucose 104 mg/dL      BUN 15 mg/dL      Creatinine 0.73 mg/dL      Sodium 143 mmol/L      Potassium 4.0 mmol/L      Chloride 106 mmol/L      CO2 28.0 mmol/L      Calcium 9.5 mg/dL      Total Protein 6.4 g/dL      Albumin 3.80 g/dL      ALT (SGPT) 12 U/L      AST (SGOT) 15 U/L      Alkaline Phosphatase 70 U/L      Total Bilirubin 0.4 mg/dL      eGFR Non African Amer 82 mL/min/1.73      Globulin 2.6 gm/dL      A/G Ratio 1.5 g/dL      BUN/Creatinine Ratio 20.5     Anion Gap 9.0 mmol/L     Narrative:      GFR Normal >60  Chronic Kidney Disease <60  Kidney Failure <15            CT Abdomen Pelvis With Contrast   Final Result   1. No acute intra-abdominal finding.   2. Cholecystectomy clips.   3. Mild calcified atherosclerosis.   4. L1-2 posterior disc osteophyte complex with at least mild associated   spinal  canal stenosis, not optimally evaluated by this exam.       This report was finalized on 04/05/2021 11:44 by Dr Nancy Johnston MD.          ED Course  ED Course as of Apr 05 1345   Mon Apr 05, 2021   0940 I explained the patient that we would be glad to do testing as far as we could to progress the work-up for her but we could not get her referral to GI doctors.  They require that to come from their primary care physician's office.  She does have an appoint with Dr. Jimenez tomorrow and will proceed with him for that if she needs a referral but will proceed with what ever testing we can do today.    [TR]   1345 Told the patient her work-up here was negative except she does have a urinary tract infection.  We will treat that.  I told the is no signs of pancreatitis or dilated common duct.  Most likely this is her stomach though she is not sure because she says she not had problems with her stomach before.  I will put her on medicine for that and she can keep her appoint with Dr. Jimenez tomorrow.  She is discharged in stable condition.    [TR]      ED Course User Index  [TR] Daren Radford Jr., MD          MDM  Number of Diagnoses or Management Options  Epigastric pain: new and requires workup     Amount and/or Complexity of Data Reviewed  Clinical lab tests: ordered and reviewed  Tests in the radiology section of CPT®: reviewed and ordered  Tests in the medicine section of CPT®: ordered and reviewed    Risk of Complications, Morbidity, and/or Mortality  Presenting problems: moderate  Diagnostic procedures: moderate  Management options: moderate    Patient Progress  Patient progress: stable      Final diagnoses:   Epigastric pain          Daren Radford Jr., MD  04/05/21 5911

## 2021-04-06 ENCOUNTER — OFFICE VISIT (OUTPATIENT)
Dept: FAMILY MEDICINE CLINIC | Facility: CLINIC | Age: 61
End: 2021-04-06

## 2021-04-06 VITALS
SYSTOLIC BLOOD PRESSURE: 128 MMHG | OXYGEN SATURATION: 99 % | HEIGHT: 65 IN | TEMPERATURE: 97.8 F | HEART RATE: 76 BPM | WEIGHT: 198 LBS | DIASTOLIC BLOOD PRESSURE: 72 MMHG | RESPIRATION RATE: 16 BRPM | BODY MASS INDEX: 32.99 KG/M2

## 2021-04-06 DIAGNOSIS — R10.13 EPIGASTRIC PAIN: Primary | ICD-10-CM

## 2021-04-06 DIAGNOSIS — M79.672 LEFT FOOT PAIN: ICD-10-CM

## 2021-04-06 LAB — BACTERIA SPEC AEROBE CULT: NORMAL

## 2021-04-06 PROCEDURE — 99202 OFFICE O/P NEW SF 15 MIN: CPT | Performed by: FAMILY MEDICINE

## 2021-04-06 NOTE — PROGRESS NOTES
Subjective   Radha Wolff is a 60 y.o. female.     Chief Complaint   Patient presents with   • Establish Care   • Hospital Follow Up Visit     LUQ pain/epigastric pain, dx with UTI in ED and having toomuch acid in stomach.   • Insomnia     History of Present Illness     Initial visit for this nice lady--went to the ed wiht epigastaic pain --she is taking melatonin to help her sleep  She is noting bloated--she is noting pain in the left foot    Current Outpatient Medications:   •  cetirizine (zyrTEC) 10 MG tablet, Take 10 mg by mouth Daily., Disp: , Rfl:   •  hydroCHLOROthiazide (MICROZIDE) 12.5 MG capsule, TAKE 1 CAPSULE DAILY, Disp: , Rfl:   •  ibuprofen (ADVIL,MOTRIN) 200 MG tablet, Take 400 mg by mouth., Disp: , Rfl:   •  LORazepam (ATIVAN) 1 MG tablet, TAKE ONE TABLET THREE TIMES DAILY AS NEEDED ANXIETY (MAY CAUSE DROWSINESS) GENERIC FOR ATIVAN, Disp: , Rfl:   •  meclizine (ANTIVERT) 25 MG tablet, Take 25 mg by mouth., Disp: , Rfl:   •  melatonin 5 MG tablet tablet, Take 10 mg by mouth Every Night., Disp: , Rfl:   •  omeprazole (priLOSEC) 20 MG capsule, Take 1 capsule by mouth 2 (Two) Times a Day., Disp: 30 capsule, Rfl: 0  •  sulfamethoxazole-trimethoprim (BACTRIM DS,SEPTRA DS) 800-160 MG per tablet, Take 1 tablet by mouth 2 (Two) Times a Day., Disp: 20 tablet, Rfl: 0  •  diphenhydrAMINE (BENADRYL) 25 mg capsule, Take 25 mg by mouth., Disp: , Rfl:   No current facility-administered medications for this visit.  Allergies   Allergen Reactions   • Bee Venom Nausea And Vomiting   • Latex Hives     01/18/2005-Latex unspecified     • Levofloxacin Delirium   • Red Dye Itching     06/22/2006-Skin itch     • Tetracycline Other (See Comments)     01/18/2005-Tetracycline intolerant     • Adhesive Tape Rash   • Wasp Venom Rash       Past Medical History:   Diagnosis Date   • Anxiety    • Dizzy spells    • PMB (postmenopausal bleeding)      Past Surgical History:   Procedure Laterality Date   • BILATERAL INSERTION OF EAR  "TUBES AND ADENOIDECTOMY     • LAPAROSCOPIC CHOLECYSTECTOMY     • TONSILLECTOMY         Review of Systems   Constitutional: Negative.    HENT: Negative.    Eyes: Negative.    Respiratory: Negative.    Cardiovascular: Negative.    Gastrointestinal: Positive for abdominal pain.   Endocrine: Negative.    Genitourinary: Negative.    Musculoskeletal: Negative.    Skin: Negative.    Allergic/Immunologic: Negative.    Neurological: Negative.    Hematological: Negative.    Psychiatric/Behavioral: Negative.        Objective  /72 (BP Location: Left arm)   Pulse 76   Temp 97.8 °F (36.6 °C)   Resp 16   Ht 165.1 cm (65\")   Wt 89.8 kg (198 lb)   SpO2 99%   BMI 32.95 kg/m²   Physical Exam  Vitals and nursing note reviewed.   Constitutional:       Appearance: Normal appearance.   HENT:      Head: Normocephalic and atraumatic.      Nose: Nose normal.      Mouth/Throat:      Mouth: Mucous membranes are moist.   Eyes:      Pupils: Pupils are equal, round, and reactive to light.   Cardiovascular:      Rate and Rhythm: Normal rate and regular rhythm.      Pulses: Normal pulses.      Heart sounds: Normal heart sounds.   Pulmonary:      Effort: Pulmonary effort is normal.   Abdominal:      General: Abdomen is flat. Bowel sounds are normal.      Palpations: Abdomen is soft.   Musculoskeletal:         General: Normal range of motion.      Cervical back: Normal range of motion and neck supple.   Skin:     General: Skin is warm and dry.      Capillary Refill: Capillary refill takes less than 2 seconds.   Neurological:      General: No focal deficit present.      Mental Status: She is alert and oriented to person, place, and time. Mental status is at baseline.   Psychiatric:         Mood and Affect: Mood normal.         Behavior: Behavior normal.         Thought Content: Thought content normal.         Judgment: Judgment normal.         Assessment/Plan   Diagnoses and all orders for this visit:    1. Epigastric pain (Primary)    2. " Left foot pain      I talked with her about getting endo and colon but she refuses ---she denies any chest pain or dyspnea  I made her aware of her need for colon.  She declines seeing podiatry referral for her foot pain.   .    No orders of the defined types were placed in this encounter.      Follow up:bea

## 2021-04-20 ENCOUNTER — LAB (OUTPATIENT)
Dept: FAMILY MEDICINE CLINIC | Facility: CLINIC | Age: 61
End: 2021-04-20

## 2021-04-20 DIAGNOSIS — R30.0 DYSURIA: Primary | ICD-10-CM

## 2021-04-20 DIAGNOSIS — F41.9 ANXIETY: Primary | ICD-10-CM

## 2021-04-20 RX ORDER — LORAZEPAM 1 MG/1
1 TABLET ORAL EVERY 8 HOURS PRN
Qty: 90 TABLET | Refills: 0 | Status: SHIPPED | OUTPATIENT
Start: 2021-04-20 | End: 2021-06-03

## 2021-04-21 ENCOUNTER — TELEPHONE (OUTPATIENT)
Dept: FAMILY MEDICINE CLINIC | Facility: CLINIC | Age: 61
End: 2021-04-21

## 2021-04-21 NOTE — TELEPHONE ENCOUNTER
Caller: Radha Wolff    Relationship to patient: Self    Best call back number: [phone]    Patient is needing: Patient would like the results of her urine test that was done in the office yesterday. Please advise.

## 2021-04-22 LAB
APPEARANCE UR: ABNORMAL
BACTERIA #/AREA URNS HPF: ABNORMAL /HPF
BACTERIA UR CULT: NORMAL
BACTERIA UR CULT: NORMAL
BILIRUB UR QL STRIP: NEGATIVE
COLOR UR: YELLOW
EPI CELLS #/AREA URNS HPF: ABNORMAL /HPF
GLUCOSE UR QL: NEGATIVE
HGB UR QL STRIP: ABNORMAL
KETONES UR QL STRIP: NEGATIVE
LEUKOCYTE ESTERASE UR QL STRIP: ABNORMAL
NITRITE UR QL STRIP: NEGATIVE
PH UR STRIP: 6 [PH] (ref 5–8)
PROT UR QL STRIP: NEGATIVE
RBC #/AREA URNS HPF: ABNORMAL /HPF
SP GR UR: 1.01 (ref 1–1.03)
UROBILINOGEN UR STRIP-MCNC: ABNORMAL MG/DL
WBC #/AREA URNS HPF: ABNORMAL /HPF

## 2021-04-22 RX ORDER — FLUCONAZOLE 100 MG/1
100 TABLET ORAL DAILY
Qty: 3 TABLET | Refills: 0 | Status: SHIPPED | OUTPATIENT
Start: 2021-04-22 | End: 2021-05-10

## 2021-05-10 ENCOUNTER — OFFICE VISIT (OUTPATIENT)
Dept: OBSTETRICS AND GYNECOLOGY | Facility: CLINIC | Age: 61
End: 2021-05-10

## 2021-05-10 ENCOUNTER — APPOINTMENT (OUTPATIENT)
Dept: MAMMOGRAPHY | Facility: HOSPITAL | Age: 61
End: 2021-05-10

## 2021-05-10 VITALS
HEIGHT: 65 IN | BODY MASS INDEX: 33.49 KG/M2 | WEIGHT: 201 LBS | SYSTOLIC BLOOD PRESSURE: 122 MMHG | DIASTOLIC BLOOD PRESSURE: 70 MMHG

## 2021-05-10 DIAGNOSIS — M54.50 LOW BACK PAIN WITHOUT SCIATICA, UNSPECIFIED BACK PAIN LATERALITY, UNSPECIFIED CHRONICITY: Primary | ICD-10-CM

## 2021-05-10 DIAGNOSIS — Z78.9 NONSMOKER: ICD-10-CM

## 2021-05-10 DIAGNOSIS — N76.0 ACUTE VAGINITIS: ICD-10-CM

## 2021-05-10 DIAGNOSIS — E66.9 OBESITY (BMI 30-39.9): ICD-10-CM

## 2021-05-10 LAB
BILIRUB BLD-MCNC: NEGATIVE MG/DL
CLARITY, POC: CLEAR
COLOR UR: YELLOW
GLUCOSE UR STRIP-MCNC: NEGATIVE MG/DL
KETONES UR QL: ABNORMAL
LEUKOCYTE EST, POC: ABNORMAL
NITRITE UR-MCNC: NEGATIVE MG/ML
PH UR: 5 [PH] (ref 5–8)
PROT UR STRIP-MCNC: ABNORMAL MG/DL
RBC # UR STRIP: ABNORMAL /UL
SP GR UR: 1.03 (ref 1–1.03)
UROBILINOGEN UR QL: NORMAL

## 2021-05-10 PROCEDURE — 81002 URINALYSIS NONAUTO W/O SCOPE: CPT | Performed by: NURSE PRACTITIONER

## 2021-05-10 PROCEDURE — 87798 DETECT AGENT NOS DNA AMP: CPT | Performed by: NURSE PRACTITIONER

## 2021-05-10 PROCEDURE — 99213 OFFICE O/P EST LOW 20 MIN: CPT | Performed by: NURSE PRACTITIONER

## 2021-05-10 PROCEDURE — 87491 CHLMYD TRACH DNA AMP PROBE: CPT | Performed by: NURSE PRACTITIONER

## 2021-05-10 PROCEDURE — 87481 CANDIDA DNA AMP PROBE: CPT | Performed by: NURSE PRACTITIONER

## 2021-05-10 PROCEDURE — 87591 N.GONORRHOEAE DNA AMP PROB: CPT | Performed by: NURSE PRACTITIONER

## 2021-05-10 PROCEDURE — 87563 M. GENITALIUM AMP PROBE: CPT | Performed by: NURSE PRACTITIONER

## 2021-05-10 PROCEDURE — 87512 GARDNER VAG DNA QUANT: CPT | Performed by: NURSE PRACTITIONER

## 2021-05-10 PROCEDURE — 87661 TRICHOMONAS VAGINALIS AMPLIF: CPT | Performed by: NURSE PRACTITIONER

## 2021-05-10 RX ORDER — METRONIDAZOLE 500 MG/1
500 TABLET ORAL 2 TIMES DAILY
Qty: 14 TABLET | Refills: 0 | Status: SHIPPED | OUTPATIENT
Start: 2021-05-10 | End: 2021-05-17

## 2021-05-11 ENCOUNTER — TELEPHONE (OUTPATIENT)
Dept: OBSTETRICS AND GYNECOLOGY | Facility: CLINIC | Age: 61
End: 2021-05-11

## 2021-05-11 LAB
C TRACH RRNA CVX QL NAA+PROBE: NOT DETECTED
N GONORRHOEA RRNA SPEC QL NAA+PROBE: NOT DETECTED
TRICHOMONAS VAGINALIS PCR: NOT DETECTED

## 2021-05-11 NOTE — TELEPHONE ENCOUNTER
Pt called req to be called with U/A & vag swab test results, she doesn't want to receive message via [employer]. Pt also states she is spotting minimal amt after getting swabbed yest. Told pt that is normal. Pt verbalized understanding.

## 2021-05-12 ENCOUNTER — HOSPITAL ENCOUNTER (OUTPATIENT)
Dept: MAMMOGRAPHY | Facility: HOSPITAL | Age: 61
Discharge: HOME | End: 2021-05-12
Admitting: OBSTETRICS & GYNECOLOGY

## 2021-05-12 ENCOUNTER — TRANSCRIBE ORDERS (OUTPATIENT)
Dept: ADMINISTRATIVE | Facility: HOSPITAL | Age: 61
End: 2021-05-12

## 2021-05-12 DIAGNOSIS — R92.8 ABNORMAL MAMMOGRAM: ICD-10-CM

## 2021-05-12 DIAGNOSIS — Z12.31 ENCOUNTER FOR SCREENING MAMMOGRAM FOR MALIGNANT NEOPLASM OF BREAST: Primary | ICD-10-CM

## 2021-05-12 LAB
BACTERIA UR CULT: NO GROWTH
BACTERIA UR CULT: NORMAL

## 2021-05-12 PROCEDURE — 77065 DX MAMMO INCL CAD UNI: CPT

## 2021-05-12 PROCEDURE — G0279 TOMOSYNTHESIS, MAMMO: HCPCS

## 2021-05-12 NOTE — TELEPHONE ENCOUNTER
"Patient has been notified of needing to schedule for US and OV, pt was questioning if we did a \"cancer screening\" on her when she was last seen.  When questioned, patient stated she wanted to know if HPV testing was done this time.  I advised patient that this is done on annual pap smears, which she just had done in OCT of 2020 and is not due to have done.  Patient then questioned results of UA, which I went over with patient, who still insisted something is not right.  I tried explaining to patient that this is why Juliet is wanting an US and OV for further evaluation.  Patient was transferred to scheduling to schedule this.   "

## 2021-05-12 NOTE — TELEPHONE ENCOUNTER
"Pt notified of neg Julio/Chlam/Trich & urine sent for culture. Pt states she looked on MyChart this am & sees that the urine culture is back and is neg. Pt states she is still having pelvic pressure, having light pink with wiping, & over all just does not feel well, afibrile. Pt states \"I just feel like something is going on down there\". Please advise.  "

## 2021-05-13 ENCOUNTER — TELEPHONE (OUTPATIENT)
Dept: OBSTETRICS AND GYNECOLOGY | Facility: CLINIC | Age: 61
End: 2021-05-13

## 2021-05-13 DIAGNOSIS — R92.1 BREAST CALCIFICATION SEEN ON MAMMOGRAM: Primary | ICD-10-CM

## 2021-05-13 LAB
LAB AP CASE REPORT: NORMAL
Lab: NORMAL

## 2021-05-13 NOTE — TELEPHONE ENCOUNTER
Pt was given RX for Flagyl on 5/10. Pt wants to know if she needs to continue taking this since all her tests were neg. Please advise.

## 2021-05-19 ENCOUNTER — OFFICE VISIT (OUTPATIENT)
Dept: OBSTETRICS AND GYNECOLOGY | Facility: CLINIC | Age: 61
End: 2021-05-19

## 2021-05-19 VITALS
SYSTOLIC BLOOD PRESSURE: 138 MMHG | TEMPERATURE: 98.3 F | BODY MASS INDEX: 33.82 KG/M2 | WEIGHT: 203 LBS | HEIGHT: 65 IN | DIASTOLIC BLOOD PRESSURE: 84 MMHG

## 2021-05-19 DIAGNOSIS — Z78.9 NON-SMOKER: ICD-10-CM

## 2021-05-19 DIAGNOSIS — E66.9 OBESITY (BMI 30.0-34.9): ICD-10-CM

## 2021-05-19 DIAGNOSIS — R31.9 HEMATURIA, UNSPECIFIED TYPE: Primary | ICD-10-CM

## 2021-05-19 PROCEDURE — 99213 OFFICE O/P EST LOW 20 MIN: CPT | Performed by: NURSE PRACTITIONER

## 2021-05-19 RX ORDER — NYSTATIN AND TRIAMCINOLONE ACETONIDE 100000; 1 [USP'U]/G; MG/G
OINTMENT TOPICAL 2 TIMES DAILY
Qty: 30 G | Refills: 0 | Status: SHIPPED | OUTPATIENT
Start: 2021-05-19 | End: 2021-08-11

## 2021-05-21 LAB
APPEARANCE UR: CLEAR
BACTERIA #/AREA URNS HPF: ABNORMAL /HPF
BACTERIA UR CULT: NO GROWTH
BACTERIA UR CULT: NORMAL
BILIRUB UR QL STRIP: NEGATIVE
CASTS URNS MICRO: ABNORMAL
COLOR UR: YELLOW
EPI CELLS #/AREA URNS HPF: ABNORMAL /HPF
GLUCOSE UR QL: NEGATIVE
HGB UR QL STRIP: NEGATIVE
KETONES UR QL STRIP: NEGATIVE
LEUKOCYTE ESTERASE UR QL STRIP: ABNORMAL
NITRITE UR QL STRIP: NEGATIVE
PH UR STRIP: 5.5 [PH] (ref 5–8)
PROT UR QL STRIP: NEGATIVE
RBC #/AREA URNS HPF: ABNORMAL /HPF
SP GR UR: 1.03 (ref 1–1.03)
UROBILINOGEN UR STRIP-MCNC: ABNORMAL MG/DL
WBC #/AREA URNS HPF: ABNORMAL /HPF

## 2021-05-24 ENCOUNTER — TELEPHONE (OUTPATIENT)
Dept: OBSTETRICS AND GYNECOLOGY | Facility: CLINIC | Age: 61
End: 2021-05-24

## 2021-05-24 DIAGNOSIS — R10.13 EPIGASTRIC DISCOMFORT: Primary | ICD-10-CM

## 2021-05-24 DIAGNOSIS — R10.9 ABDOMINAL DISCOMFORT: ICD-10-CM

## 2021-05-24 NOTE — PATIENT INSTRUCTIONS
"BMI for Adults  What is BMI?  Body mass index (BMI) is a number that is calculated from a person's weight and height. BMI can help estimate how much of a person's weight is composed of fat. BMI does not measure body fat directly. Rather, it is an alternative to procedures that directly measure body fat, which can be difficult and expensive.  BMI can help identify people who may be at higher risk for certain medical problems.  What are BMI measurements used for?  BMI is used as a screening tool to identify possible weight problems. It helps determine whether a person is obese, overweight, a healthy weight, or underweight.  BMI is useful for:  · Identifying a weight problem that may be related to a medical condition or may increase the risk for medical problems.  · Promoting changes, such as changes in diet and exercise, to help reach a healthy weight. BMI screening can be repeated to see if these changes are working.  How is BMI calculated?  BMI involves measuring your weight in relation to your height. Both height and weight are measured, and the BMI is calculated from those numbers. This can be done either in English (U.S.) or metric measurements. Note that charts and online BMI calculators are available to help you find your BMI quickly and easily without having to do these calculations yourself.  To calculate your BMI in English (U.S.) measurements:    1. Measure your weight in pounds (lb).  2. Multiply the number of pounds by 703.  ? For example, for a person who weighs 180 lb, multiply that number by 703, which equals 126,540.  3. Measure your height in inches. Then multiply that number by itself to get a measurement called \"inches squared.\"  ? For example, for a person who is 70 inches tall, the \"inches squared\" measurement is 70 inches x 70 inches, which equals 4,900 inches squared.  4. Divide the total from step 2 (number of lb x 703) by the total from step 3 (inches squared): 126,540 ÷ 4,900 = 25.8. This is " "your BMI.  To calculate your BMI in metric measurements:  1. Measure your weight in kilograms (kg).  2. Measure your height in meters (m). Then multiply that number by itself to get a measurement called \"meters squared.\"  ? For example, for a person who is 1.75 m tall, the \"meters squared\" measurement is 1.75 m x 1.75 m, which is equal to 3.1 meters squared.  3. Divide the number of kilograms (your weight) by the meters squared number. In this example: 70 ÷ 3.1 = 22.6. This is your BMI.  What do the results mean?  BMI charts are used to identify whether you are underweight, normal weight, overweight, or obese. The following guidelines will be used:  · Underweight: BMI less than 18.5.  · Normal weight: BMI between 18.5 and 24.9.  · Overweight: BMI between 25 and 29.9.  · Obese: BMI of 30 or above.  Keep these notes in mind:  · Weight includes both fat and muscle, so someone with a muscular build, such as an athlete, may have a BMI that is higher than 24.9. In cases like these, BMI is not an accurate measure of body fat.  · To determine if excess body fat is the cause of a BMI of 25 or higher, further assessments may need to be done by a health care provider.  · BMI is usually interpreted in the same way for men and women.  Where to find more information  For more information about BMI, including tools to quickly calculate your BMI, go to these websites:  · Centers for Disease Control and Prevention: www.cdc.gov  · American Heart Association: www.heart.org  · National Heart, Lung, and Blood [place]: www.nhlbi.nih.gov  Summary  · Body mass index (BMI) is a number that is calculated from a person's weight and height.  · BMI may help estimate how much of a person's weight is composed of fat. BMI can help identify those who may be at higher risk for certain medical problems.  · BMI can be measured using English measurements or metric measurements.  · BMI charts are used to identify whether you are underweight, normal " weight, overweight, or obese.  This information is not intended to replace advice given to you by your health care provider. Make sure you discuss any questions you have with your health care provider.  Document Revised: 09/09/2020 Document Reviewed: 07/17/2020  Elsevier Patient Education © 2021 Elsevier Inc.

## 2021-05-24 NOTE — TELEPHONE ENCOUNTER
Referral was made to GI r/t pt continued symptoms.   No referral to urology r/t no further hematuria noted on UA.

## 2021-06-01 ENCOUNTER — TELEPHONE (OUTPATIENT)
Dept: OBSTETRICS AND GYNECOLOGY | Facility: CLINIC | Age: 61
End: 2021-06-01

## 2021-06-01 NOTE — TELEPHONE ENCOUNTER
PC wanting to know what the hold up is on getting GI referral. In chart it shows pending. Pt wants to know if it's pending from our office or their office. Please let her know . Thank you.

## 2021-06-02 NOTE — TELEPHONE ENCOUNTER
I have been out of the office so today is my first day to look at it and respond to the referral. [facility] only takes referrals by fax. This has been faxed to their office and they will call patient to schedule after reviewing her records. I will call her and let her know this.

## 2021-06-03 DIAGNOSIS — F41.9 ANXIETY: ICD-10-CM

## 2021-06-03 RX ORDER — LORAZEPAM 1 MG/1
TABLET ORAL
Qty: 90 TABLET | Refills: 0 | Status: SHIPPED | OUTPATIENT
Start: 2021-06-03 | End: 2021-07-16

## 2021-06-03 NOTE — PATIENT INSTRUCTIONS
"BMI for Adults  What is BMI?  Body mass index (BMI) is a number that is calculated from a person's weight and height. BMI can help estimate how much of a person's weight is composed of fat. BMI does not measure body fat directly. Rather, it is an alternative to procedures that directly measure body fat, which can be difficult and expensive.  BMI can help identify people who may be at higher risk for certain medical problems.  What are BMI measurements used for?  BMI is used as a screening tool to identify possible weight problems. It helps determine whether a person is obese, overweight, a healthy weight, or underweight.  BMI is useful for:  · Identifying a weight problem that may be related to a medical condition or may increase the risk for medical problems.  · Promoting changes, such as changes in diet and exercise, to help reach a healthy weight. BMI screening can be repeated to see if these changes are working.  How is BMI calculated?  BMI involves measuring your weight in relation to your height. Both height and weight are measured, and the BMI is calculated from those numbers. This can be done either in English (U.S.) or metric measurements. Note that charts and online BMI calculators are available to help you find your BMI quickly and easily without having to do these calculations yourself.  To calculate your BMI in English (U.S.) measurements:    1. Measure your weight in pounds (lb).  2. Multiply the number of pounds by 703.  ? For example, for a person who weighs 180 lb, multiply that number by 703, which equals 126,540.  3. Measure your height in inches. Then multiply that number by itself to get a measurement called \"inches squared.\"  ? For example, for a person who is 70 inches tall, the \"inches squared\" measurement is 70 inches x 70 inches, which equals 4,900 inches squared.  4. Divide the total from step 2 (number of lb x 703) by the total from step 3 (inches squared): 126,540 ÷ 4,900 = 25.8. This is " "your BMI.  To calculate your BMI in metric measurements:  1. Measure your weight in kilograms (kg).  2. Measure your height in meters (m). Then multiply that number by itself to get a measurement called \"meters squared.\"  ? For example, for a person who is 1.75 m tall, the \"meters squared\" measurement is 1.75 m x 1.75 m, which is equal to 3.1 meters squared.  3. Divide the number of kilograms (your weight) by the meters squared number. In this example: 70 ÷ 3.1 = 22.6. This is your BMI.  What do the results mean?  BMI charts are used to identify whether you are underweight, normal weight, overweight, or obese. The following guidelines will be used:  · Underweight: BMI less than 18.5.  · Normal weight: BMI between 18.5 and 24.9.  · Overweight: BMI between 25 and 29.9.  · Obese: BMI of 30 or above.  Keep these notes in mind:  · Weight includes both fat and muscle, so someone with a muscular build, such as an athlete, may have a BMI that is higher than 24.9. In cases like these, BMI is not an accurate measure of body fat.  · To determine if excess body fat is the cause of a BMI of 25 or higher, further assessments may need to be done by a health care provider.  · BMI is usually interpreted in the same way for men and women.  Where to find more information  For more information about BMI, including tools to quickly calculate your BMI, go to these websites:  · Centers for Disease Control and Prevention: www.cdc.gov  · American Heart Association: www.heart.org  · National Heart, Lung, and Blood [place]: www.nhlbi.nih.gov  Summary  · Body mass index (BMI) is a number that is calculated from a person's weight and height.  · BMI may help estimate how much of a person's weight is composed of fat. BMI can help identify those who may be at higher risk for certain medical problems.  · BMI can be measured using English measurements or metric measurements.  · BMI charts are used to identify whether you are underweight, normal " weight, overweight, or obese.  This information is not intended to replace advice given to you by your health care provider. Make sure you discuss any questions you have with your health care provider.  Document Revised: 09/09/2020 Document Reviewed: 07/17/2020  Elsevier Patient Education © 2021 Elsevier Inc.

## 2021-06-04 ENCOUNTER — TELEPHONE (OUTPATIENT)
Dept: OBSTETRICS AND GYNECOLOGY | Facility: CLINIC | Age: 61
End: 2021-06-04

## 2021-06-04 NOTE — TELEPHONE ENCOUNTER
Peggy from [facility] called stating that they do not accept patient's insurance ([employer]). She states that patient would need to be referred elsewhere.

## 2021-07-14 ENCOUNTER — OFFICE VISIT (OUTPATIENT)
Dept: GASTROENTEROLOGY | Facility: CLINIC | Age: 61
End: 2021-07-14

## 2021-07-14 VITALS
TEMPERATURE: 96.6 F | OXYGEN SATURATION: 99 % | SYSTOLIC BLOOD PRESSURE: 122 MMHG | WEIGHT: 203 LBS | HEART RATE: 76 BPM | HEIGHT: 65 IN | DIASTOLIC BLOOD PRESSURE: 70 MMHG | BODY MASS INDEX: 33.82 KG/M2

## 2021-07-14 DIAGNOSIS — R10.13 EPIGASTRIC PAIN: Primary | ICD-10-CM

## 2021-07-14 DIAGNOSIS — K59.09 CHRONIC CONSTIPATION: ICD-10-CM

## 2021-07-14 PROCEDURE — 99214 OFFICE O/P EST MOD 30 MIN: CPT | Performed by: NURSE PRACTITIONER

## 2021-07-14 RX ORDER — MULTIPLE VITAMINS W/ MINERALS TAB 9MG-400MCG
1 TAB ORAL DAILY
COMMUNITY
End: 2022-06-29

## 2021-07-14 RX ORDER — PANTOPRAZOLE SODIUM 40 MG/1
40 TABLET, DELAYED RELEASE ORAL DAILY
Qty: 30 TABLET | Refills: 11 | Status: SHIPPED | OUTPATIENT
Start: 2021-07-14 | End: 2021-09-15

## 2021-07-14 RX ORDER — PROMETHAZINE HYDROCHLORIDE 12.5 MG/1
12.5 TABLET ORAL AS NEEDED
COMMUNITY
End: 2021-09-15

## 2021-07-14 NOTE — PROGRESS NOTES
[facility] GASTROENTEROLOGY - OFFICE NOTE    7/14/2021    Radha Wolff   1960    Primary Physician: Daren Zazueta MD     Referring Provider: Juliet ARELLANO     Chief Complaint   Patient presents with   • Abdominal Pain   Chronic constipation      HISTORY OF PRESENT ILLNESS:     Radha Wolff is a 60 y.o. female presents with patricia pain. This started  Easter this year. Location is patricia area and is intermittent. Typically occurs hours after eating. Certain foods such as fried fish, dumplings, mayonnaise, or raw vegetables. Has associated upper abdominal bloating. Takes ibuprofen 800 mg daily for 2 years. History of ninoska 2008 , no stones. Tried omeprazole daily for 3 weeks and did not help.  No n/v. No fever. No weight loss. No weight loss. No black stool.         Chronic constipation  Intermittent for years. Taking magnesium that does help eventually. Tried miralax daily for 1 month and did not help. Falx seed helps. No fiber supplement.  No rectal bleeding.             CT abdomen/pelvis with contrast 4-5-21  IMPRESSION:  1. No acute intra-abdominal finding.  2. Cholecystectomy clips.  3. Mild calcified atherosclerosis.  4. L1-2 posterior disc osteophyte complex with at least mild associated spinal canal stenosis, not optimally evaluated by this exam.       Transvaginal ultrasound May 19, 2021  Narrative & Impression    Indications: Pelvic pain  No comparisons made  Findings: 5.3 cm retroverted uterus with a small fundal fibroid.  Ovaries are normal bilaterally.  There is no free fluid.  Endometrial lining measures 4.4 mm     Philip Cooney MD         Labs 4/2021 cmp gluc 104 otherwise normal, lipase normal, and CBC normal.      No history of colonoscopy or egd.   No family history of colon cancer/polyps.   Her mother had liver cancer.       Past Medical History:   Diagnosis Date   • Anxiety    • Arthritis    • Dizzy spells    • Hematuria    • PMB (postmenopausal bleeding)        Past Surgical  History:   Procedure Laterality Date   • BILATERAL INSERTION OF EAR TUBES AND ADENOIDECTOMY     • LAPAROSCOPIC CHOLECYSTECTOMY     • TONSILLECTOMY         Outpatient Medications Marked as Taking for the 7/14/21 encounter (Office Visit) with Yaneth Markham APRN   Medication Sig Dispense Refill   • Ascorbic Acid (VITAMIN C PO) Take  by mouth Daily.     • cetirizine (zyrTEC) 10 MG tablet Take 10 mg by mouth Daily.     • Cholecalciferol (VITAMIN D3 PO) Take  by mouth Daily.     • COLLAGEN PO Take  by mouth Daily.     • diphenhydrAMINE (BENADRYL) 25 mg capsule Take 25 mg by mouth As Needed.     • GLUCOSAMINE-CHONDROITIN-MSM PO Take  by mouth Daily.     • hydroCHLOROthiazide (MICROZIDE) 12.5 MG capsule TAKE 1 CAPSULE DAILY     • ibuprofen (ADVIL,MOTRIN) 200 MG tablet Take 400 mg by mouth.     • KRILL OIL PO Take  by mouth Daily.     • LORazepam (ATIVAN) 1 MG tablet TAKE ONE TABLET EVERY EIGHT HOURS AS NEEDED FOR ANXIETY GENERIC FOR ATIVAN 90 tablet 0   • MAGNESIUM PO Take  by mouth 2 (two) times a day.     • meclizine (ANTIVERT) 25 MG tablet Take 25 mg by mouth.     • melatonin 5 MG tablet tablet Take 10 mg by mouth Every Night.     • Multiple Vitamins-Minerals (ZINC PO) Take  by mouth Daily.     • multivitamin with minerals tablet tablet Take 1 tablet by mouth Daily.     • Omega-3 Fatty Acids (FISH OIL PO) Take  by mouth Daily.     • promethazine (PHENERGAN) 12.5 MG tablet Take 12.5 mg by mouth As Needed for Nausea or Vomiting.     • TURMERIC PO Take  by mouth Daily.     • VITAMIN B COMPLEX-C PO Take  by mouth Daily.         Allergies   Allergen Reactions   • Bee Venom Nausea And Vomiting   • Latex Hives     01/18/2005-Latex unspecified     • Levofloxacin Delirium   • Red Dye Itching     06/22/2006-Skin itch     • Tetracycline Other (See Comments)     01/18/2005-Tetracycline intolerant     • Adhesive Tape Rash   • Tegaderm Ag Mesh [Silver] Rash   • Wasp Venom Rash       Social History     Socioeconomic History   •  "Marital status: Single     Spouse name: Not on file   • Number of children: Not on file   • Years of education: Not on file   • Highest education level: Not on file   Tobacco Use   • Smoking status: Never Smoker   • Smokeless tobacco: Never Used   Substance and Sexual Activity   • Alcohol use: Not Currently   • Drug use: Never   • Sexual activity: Yes     Partners: Male       Family History   Problem Relation Age of Onset   • Lung cancer Mother    • Liver cancer Mother    • Lung cancer Father    • Obesity Brother    • Heart disease Brother    • Arthritis Brother    • No Known Problems Maternal Grandmother    • No Known Problems Paternal Grandmother    • No Known Problems Maternal Aunt    • No Known Problems Paternal Aunt    • No Known Problems Sister    • No Known Problems Daughter    • No Known Problems Son    • No Known Problems Other    • BRCA 1/2 Neg Hx    • Breast cancer Neg Hx    • Colon cancer Neg Hx    • Endometrial cancer Neg Hx    • Ovarian cancer Neg Hx    • Colon polyps Neg Hx        Review of Systems   Constitutional: Negative for chills, fever and unexpected weight change.   Respiratory: Negative for cough, shortness of breath and wheezing.    Cardiovascular: Negative for chest pain and palpitations.   Gastrointestinal: Positive for abdominal pain and constipation. Negative for abdominal distention, anal bleeding, blood in stool, diarrhea, nausea and vomiting.        Vitals:    07/14/21 1213   BP: 122/70   Pulse: 76   Temp: 96.6 °F (35.9 °C)   SpO2: 99%   Weight: 92.1 kg (203 lb)   Height: 165.1 cm (65\")      Body mass index is 33.78 kg/m².    Physical Exam  Vitals reviewed.   Constitutional:       General: She is not in acute distress.  Cardiovascular:      Rate and Rhythm: Normal rate and regular rhythm.      Heart sounds: Normal heart sounds.   Pulmonary:      Effort: Pulmonary effort is normal.      Breath sounds: Normal breath sounds.   Abdominal:      General: Bowel sounds are normal. There is no " distension.      Palpations: Abdomen is soft.      Tenderness: There is no abdominal tenderness.   Skin:     General: Skin is warm and dry.   Neurological:      Mental Status: She is alert.         Results for orders placed or performed in visit on 05/19/21   Urine Culture - Urine, Urine, Random Void    Specimen: Urine, Random Void    UR   Result Value Ref Range    Urine Culture Final report     Result 1 Comment    Urine Culture - Urine, Urine, Clean Catch    Specimen: Urine, Clean Catch    UR   Result Value Ref Range    Urine Culture Final report     Result 1 No growth    Urinalysis With Microscopic If Indicated (No Culture) - Urine, Clean Catch    Specimen: Urine, Clean Catch   Result Value Ref Range    Specific Gravity, UA 1.027 1.005 - 1.030    pH, UA 5.5 5.0 - 8.0    Color, UA Yellow     Appearance, UA Clear Clear    Leukocytes, UA See below: (A) Negative    Protein Negative Negative    Glucose, UA Negative Negative    Ketones Negative Negative    Blood, UA Negative Negative    Bilirubin, UA Negative Negative    Urobilinogen, UA Comment     Nitrite, UA Negative Negative   Microscopic Examination -   Result Value Ref Range    WBC, UA See below: (A) /HPF    RBC, UA 0-2 /HPF    Epithelial Cells (non renal) 0-2 /HPF    Cast Type Comment     Bacteria, UA Comment None Seen /HPF           ASSESSMENT AND PLAN    Assessment/Plan     Diagnoses and all orders for this visit:    1. Epigastric pain (Primary)    2. Chronic constipation    Other orders  -     pantoprazole (PROTONIX) 40 MG EC tablet; Take 1 tablet by mouth Daily.  Dispense: 30 tablet; Refill: 11           In regards to patricia pain,  differential diagnoses discussed.   I would recommend stop ibuprofen. No asa or nsaids. Trial of pantoprazole once daily. I recommended egd for further eval and she politely declines at this time.  I also discussed other options such as upper GI and she politely declines.  Recommend Emergency Room if worsening symptoms.  We will see  her back in 1 month for reevaluation.         In regards to chronic constipation, recommend increase water intake , start daily citrucel, increase exercise. I also discussed using miralax and increasing the dose.  I did recommend a colonoscopy since she is over the age of 52.  At this time she does not wish to proceed with colonoscopy.      Office visit in 1 month sooner if any questions concerns or problems.              Return in about 1 month (around 8/14/2021).          Yaneth Markham, APRN

## 2021-07-16 DIAGNOSIS — F41.9 ANXIETY: ICD-10-CM

## 2021-07-16 RX ORDER — LORAZEPAM 1 MG/1
TABLET ORAL
Qty: 90 TABLET | Refills: 0 | Status: SHIPPED | OUTPATIENT
Start: 2021-07-16 | End: 2021-08-27

## 2021-07-16 NOTE — TELEPHONE ENCOUNTER
Last filled 6/3/2021      Requested Prescriptions     Pending Prescriptions Disp Refills   • LORazepam (ATIVAN) 1 MG tablet [Pharmacy Med Name: LORAZEPAM 1MG TABLET] 90 tablet 0     Sig: TAKE ONE TABLET EVERY EIGHT HOURS AS NEEDED FOR ANXIETY GENERIC FOR ATIVAN

## 2021-07-22 PROCEDURE — 87635 SARS-COV-2 COVID-19 AMP PRB: CPT | Performed by: NURSE PRACTITIONER

## 2021-08-11 ENCOUNTER — OFFICE VISIT (OUTPATIENT)
Dept: GASTROENTEROLOGY | Facility: CLINIC | Age: 61
End: 2021-08-11

## 2021-08-11 VITALS
WEIGHT: 203 LBS | HEART RATE: 78 BPM | DIASTOLIC BLOOD PRESSURE: 78 MMHG | SYSTOLIC BLOOD PRESSURE: 124 MMHG | TEMPERATURE: 97.3 F | BODY MASS INDEX: 33.82 KG/M2 | HEIGHT: 65 IN | OXYGEN SATURATION: 99 %

## 2021-08-11 DIAGNOSIS — K59.09 CHRONIC CONSTIPATION: ICD-10-CM

## 2021-08-11 DIAGNOSIS — R10.13 EPIGASTRIC PAIN: Primary | ICD-10-CM

## 2021-08-11 PROCEDURE — 99214 OFFICE O/P EST MOD 30 MIN: CPT | Performed by: NURSE PRACTITIONER

## 2021-08-11 NOTE — PROGRESS NOTES
[facility] GASTROENTEROLOGY - OFFICE NOTE    8/11/2021    Radha Wolff   1960    Primary Physician: Daren Zazueta MD    Chief Complaint   Patient presents with   • Abdominal Pain   • Constipation         HISTORY OF PRESENT ILLNESS:     Radha Wolff is a 60 y.o. female presents for f/u patricia pain and constipation.  I saw her recently in the office and I started her on proton 1 pill daily.  I also recommended stop ibuprofen and only use Tylenol or Tylenol arthritis.  She did stop the ibuprofen.  Since starting on the pantoprazole her midepigastric pain has resolved.  I did recommend EGD for further evaluation at her last office visit but the patient declined any further work-up.      She was also having trouble with constipation which was chronic.  I recommended a trial of MiraLAX as well as starting daily Citrucel.  She did not do either of these.  The constipation is much better since starting on Protonix.  I do recommend a colonoscopy however the patient has continued to decline.  She has not noted any rectal bleeding or unintentional weight loss.  There is no family history of colon cancer or colon polyps.  She has never had a colonoscopy.                ====================================================================o    OV  7-14-21 HPI Radha Wolff is a 60 y.o. female presents with patricia pain. This started  Easter this year. Location is [facility] area and is intermittent. Typically occurs hours after eating. Certain foods such as fried fish, dumplings, mayonnaise, or raw vegetables. Has associated upper abdominal bloating. Takes ibuprofen 800 mg daily for 2 years. History of ninoska 2008 , no stones. Tried omeprazole daily for 3 weeks and did not help.  No n/v. No fever. No weight loss. No weight loss. No black stool.            Chronic constipation  Intermittent for years. Taking magnesium that does help eventually. Tried miralax daily for 1 month and did not help. Falx seed helps. No fiber  supplement.  No rectal bleeding.                  CT abdomen/pelvis with contrast 4-5-21  IMPRESSION:  1. No acute intra-abdominal finding.  2. Cholecystectomy clips.  3. Mild calcified atherosclerosis.  4. L1-2 posterior disc osteophyte complex with at least mild associated spinal canal stenosis, not optimally evaluated by this exam.         Transvaginal ultrasound May 19, 2021  Narrative & Impression     Indications: Pelvic pain  No comparisons made  Findings: 5.3 cm retroverted uterus with a small fundal fibroid.  Ovaries are normal bilaterally.  There is no free fluid.  Endometrial lining measures 4.4 mm     Philip Cooney MD            Labs 4/2021 cmp gluc 104 otherwise normal, lipase normal, and CBC normal.        No history of colonoscopy or egd.   No family history of colon cancer/polyps.   Her mother had liver cancer.       Past Medical History:   Diagnosis Date   • Anxiety    • Arthritis    • Dizzy spells    • Hematuria    • Osteoarthritis    • PMB (postmenopausal bleeding)        Past Surgical History:   Procedure Laterality Date   • ADENOIDECTOMY     • BILATERAL INSERTION OF EAR TUBES AND ADENOIDECTOMY     • LAPAROSCOPIC CHOLECYSTECTOMY     • TONSILLECTOMY         Outpatient Medications Marked as Taking for the 8/11/21 encounter (Office Visit) with Yaneth Markham APRN   Medication Sig Dispense Refill   • Ascorbic Acid (VITAMIN C PO) Take  by mouth Daily.     • cetirizine (zyrTEC) 10 MG tablet Take 10 mg by mouth Daily.     • Cholecalciferol (VITAMIN D3 PO) Take  by mouth Daily.     • COLLAGEN PO Take  by mouth Daily.     • diphenhydrAMINE (BENADRYL) 25 mg capsule Take 25 mg by mouth As Needed.     • GLUCOSAMINE-CHONDROITIN-MSM PO Take  by mouth Daily.     • hydroCHLOROthiazide (MICROZIDE) 12.5 MG capsule TAKE 1 CAPSULE DAILY     • KRILL OIL PO Take  by mouth Daily.     • LORazepam (ATIVAN) 1 MG tablet TAKE ONE TABLET EVERY EIGHT HOURS AS NEEDED FOR ANXIETY GENERIC FOR ATIVAN  90 tablet 0   •  MAGNESIUM PO Take  by mouth 2 (two) times a day.     • meclizine (ANTIVERT) 25 MG tablet Take 25 mg by mouth.     • melatonin 5 MG tablet tablet Take 10 mg by mouth Every Night.     • Multiple Vitamins-Minerals (ZINC PO) Take  by mouth Daily.     • multivitamin with minerals tablet tablet Take 1 tablet by mouth Daily.     • Omega-3 Fatty Acids (FISH OIL PO) Take  by mouth Daily.     • pantoprazole (PROTONIX) 40 MG EC tablet Take 1 tablet by mouth Daily. 30 tablet 11   • promethazine (PHENERGAN) 12.5 MG tablet Take 12.5 mg by mouth As Needed for Nausea or Vomiting.     • TURMERIC PO Take  by mouth Daily.     • VITAMIN B COMPLEX-C PO Take  by mouth Daily.     • [DISCONTINUED] nystatin-triamcinolone (MYCOLOG) 861969-1.1 UNIT/GM-% ointment Apply  topically to the appropriate area as directed 2 (Two) Times a Day. 30 g 0       Allergies   Allergen Reactions   • Bee Venom Nausea And Vomiting   • Latex Hives     01/18/2005-Latex unspecified     • Levofloxacin Delirium   • Red Dye Itching     06/22/2006-Skin itch     • Tetracycline Other (See Comments)     01/18/2005-Tetracycline intolerant     • Adhesive Tape Rash   • Tegaderm Ag Mesh [Silver] Rash   • Wasp Venom Rash       Social History     Socioeconomic History   • Marital status: Single     Spouse name: Not on file   • Number of children: Not on file   • Years of education: Not on file   • Highest education level: Not on file   Tobacco Use   • Smoking status: Never Smoker   • Smokeless tobacco: Never Used   Substance and Sexual Activity   • Alcohol use: Not Currently   • Drug use: Never   • Sexual activity: Yes     Partners: Male       Family History   Problem Relation Age of Onset   • Lung cancer Mother    • Liver cancer Mother    • Lung cancer Father    • Obesity Brother    • Heart disease Brother    • Arthritis Brother    • No Known Problems Maternal Grandmother    • No Known Problems Paternal Grandmother    • No Known Problems Maternal Aunt    • No Known Problems  "Paternal Aunt    • No Known Problems Sister    • No Known Problems Daughter    • No Known Problems Son    • No Known Problems Other    • BRCA 1/2 Neg Hx    • Breast cancer Neg Hx    • Colon cancer Neg Hx    • Endometrial cancer Neg Hx    • Ovarian cancer Neg Hx    • Colon polyps Neg Hx    • Esophageal cancer Neg Hx        Review of Systems   Constitutional: Negative for chills, fever and unexpected weight change.   Gastrointestinal: Negative for abdominal distention, abdominal pain, anal bleeding, blood in stool, constipation, diarrhea, nausea and vomiting.        Vitals:    08/11/21 1249   BP: 124/78   Pulse: 78   Temp: 97.3 °F (36.3 °C)   SpO2: 99%   Weight: 92.1 kg (203 lb)   Height: 165.1 cm (65\")      Body mass index is 33.78 kg/m².    Physical Exam  Vitals reviewed.   Constitutional:       General: She is not in acute distress.  Cardiovascular:      Rate and Rhythm: Normal rate and regular rhythm.      Heart sounds: Normal heart sounds.   Pulmonary:      Effort: Pulmonary effort is normal.      Breath sounds: Normal breath sounds.   Abdominal:      General: Bowel sounds are normal. There is no distension.      Palpations: Abdomen is soft.      Tenderness: There is no abdominal tenderness.   Skin:     General: Skin is warm and dry.   Neurological:      Mental Status: She is alert.         Results for orders placed or performed during the hospital encounter of 07/22/21   COVID-19,[facility] IN-HOUSE,Nasal Swab No Transport Media 3-4 HR TAT - Swab, Nasal Cavity    Specimen: Nasal Cavity; Swab   Result Value Ref Range    COVID19 Not Detected Not Detected - Ref. Range           ASSESSMENT AND PLAN    Assessment/Plan     Diagnoses and all orders for this visit:    1. Epigastric pain (Primary)    2. Chronic constipation      Epigastric pain has resolved.  She did stop taking ibuprofen and started on pantoprazole daily.  We discussed differential diagnosis.  I did recommend an upper endoscopy and she politely continues " to decline.  She will let me know if she does change her mind.  Fortunately she is not having any further pain.  Recommend continue Protonix on a daily basis and may stop taking after a total of 2 months and see how things go then.  She will call me if has any questions concerns or problems.      In regards to chronic constipation, this has resolved as well.  Since starting on Protonix she is noted that her bowels are moving better.  She never started on MiraLAX or daily fiber supplement as I had previously recommended.  I still would recommend taking a fiber every day.  I also recommended a colonoscopy.  She understands that the colonoscopy is to try to prevent colon cancer.  She still declines proceeding with colonoscopy but will call me if she does change her mind.          EILEEN Perea

## 2021-08-27 DIAGNOSIS — F41.9 ANXIETY: ICD-10-CM

## 2021-08-27 RX ORDER — LORAZEPAM 1 MG/1
TABLET ORAL
Qty: 90 TABLET | Refills: 0 | Status: SHIPPED | OUTPATIENT
Start: 2021-08-27 | End: 2021-09-28

## 2021-08-27 RX ORDER — LORAZEPAM 1 MG/1
TABLET ORAL
Qty: 90 TABLET | Refills: 0 | OUTPATIENT
Start: 2021-08-27

## 2021-08-27 NOTE — TELEPHONE ENCOUNTER
Caller: MananmichaelRadha    Relationship: Self    Best call back number: [phone]     Medication needed:   Requested Prescriptions     Pending Prescriptions Disp Refills   • LORazepam (ATIVAN) 1 MG tablet 90 tablet 0       When do you need the refill by: PATIENT GOING OUT OF TOWN TODAY @ 2:30    What additional details did the patient provide when requesting the medication: OUT OF MEDICATION    Does the patient have less than a 3 day supply:  [] Yes  [] No    What is the patient's preferred pharmacy:      [facility] - [place] - [address] [place] [address] [phone] [facility] [phone]

## 2021-09-15 ENCOUNTER — OFFICE VISIT (OUTPATIENT)
Dept: FAMILY MEDICINE CLINIC | Facility: CLINIC | Age: 61
End: 2021-09-15

## 2021-09-15 VITALS
RESPIRATION RATE: 16 BRPM | HEIGHT: 65 IN | OXYGEN SATURATION: 98 % | DIASTOLIC BLOOD PRESSURE: 84 MMHG | HEART RATE: 77 BPM | BODY MASS INDEX: 33.66 KG/M2 | WEIGHT: 202 LBS | SYSTOLIC BLOOD PRESSURE: 126 MMHG

## 2021-09-15 DIAGNOSIS — T14.8XXA BRUISING: ICD-10-CM

## 2021-09-15 DIAGNOSIS — R53.83 OTHER FATIGUE: Primary | ICD-10-CM

## 2021-09-15 DIAGNOSIS — G47.00 INSOMNIA, UNSPECIFIED TYPE: ICD-10-CM

## 2021-09-15 PROCEDURE — 99213 OFFICE O/P EST LOW 20 MIN: CPT | Performed by: FAMILY MEDICINE

## 2021-09-15 NOTE — PROGRESS NOTES
Subjective   Radha Wolff is a 60 y.o. female.     Chief Complaint   Patient presents with   • Cold Extremity     pt states that she is cold all the time.  she says she body aches & pain that comes and goes.    • Obesity     pt states that she has recent wt gain   • Insomnia     pt c/o difficulty sleeping   • Fatigue     pt states that she is tired all the time and has brain fog at times       History of Present Illness     as above --she is wondering if her issues are stemming from thyroid or lack of estrogen hormone--she notes bruising on her extremties without injury for several days      Current Outpatient Medications:   •  Ascorbic Acid (VITAMIN C PO), Take  by mouth Daily., Disp: , Rfl:   •  ascorbic acid (VITAMIN C) 100 MG tablet, , Disp: , Rfl:   •  cetirizine (zyrTEC) 10 MG tablet, Take 10 mg by mouth Daily., Disp: , Rfl:   •  Cholecalciferol (VITAMIN D3 PO), Take  by mouth Daily., Disp: , Rfl:   •  COLLAGEN PO, Take  by mouth Daily., Disp: , Rfl:   •  diphenhydrAMINE (BENADRYL) 25 mg capsule, Take 25 mg by mouth As Needed., Disp: , Rfl:   •  GLUCOSAMINE-CHONDROITIN-MSM PO, Take  by mouth Daily., Disp: , Rfl:   •  hydroCHLOROthiazide (MICROZIDE) 12.5 MG capsule, TAKE 1 CAPSULE DAILY, Disp: , Rfl:   •  KRILL OIL PO, Take  by mouth Daily., Disp: , Rfl:   •  LORazepam (ATIVAN) 1 MG tablet, TAKE ONE TABLET EVERY EIGHT HOURS AS NEEDED FOR ANXIETY GENERIC FOR ATIVAN , Disp: 90 tablet, Rfl: 0  •  MAGNESIUM PO, Take  by mouth 2 (two) times a day., Disp: , Rfl:   •  meclizine (ANTIVERT) 25 MG tablet, Take 25 mg by mouth., Disp: , Rfl:   •  melatonin 5 MG tablet tablet, Take 10 mg by mouth Every Night., Disp: , Rfl:   •  multivitamin with minerals tablet tablet, Take 1 tablet by mouth Daily., Disp: , Rfl:   •  Omega-3 Fatty Acids (FISH OIL PO), Take  by mouth Daily., Disp: , Rfl:   •  SELENIUM PO, , Disp: , Rfl:   •  TURMERIC PO, Take  by mouth Daily., Disp: , Rfl:   •  VITAMIN B COMPLEX-C PO, Take  by mouth Daily.,  "Disp: , Rfl:   •  vitamin E 100 UNIT capsule, , Disp: , Rfl:   •  Zinc Sulfate (ZINC 15 PO), , Disp: , Rfl:   Allergies   Allergen Reactions   • Bee Venom Nausea And Vomiting   • Latex Hives     01/18/2005-Latex unspecified     • Levofloxacin Delirium   • Poison Ivy Extract Hives   • Red Dye Itching     06/22/2006-Skin itch     • Tetracycline Other (See Comments)     01/18/2005-Tetracycline intolerant     • Adhesive Tape Rash   • Tegaderm Ag Mesh [Silver] Rash   • Wasp Venom Rash       Past Medical History:   Diagnosis Date   • Anxiety    • Arthritis    • Dizzy spells    • Hematuria    • Osteoarthritis    • PMB (postmenopausal bleeding)      Past Surgical History:   Procedure Laterality Date   • ADENOIDECTOMY     • BILATERAL INSERTION OF EAR TUBES AND ADENOIDECTOMY     • LAPAROSCOPIC CHOLECYSTECTOMY     • TONSILLECTOMY         Review of Systems   Constitutional: Positive for fatigue.   HENT: Negative.    Eyes: Negative.    Respiratory: Negative.    Cardiovascular: Negative.    Gastrointestinal: Negative.    Endocrine: Negative.    Genitourinary: Negative.    Musculoskeletal: Negative.    Skin: Negative.    Neurological: Negative.    Hematological: Negative.    Psychiatric/Behavioral: Negative.        Objective  /84   Pulse 77   Resp 16   Ht 165.1 cm (65\")   Wt 91.6 kg (202 lb)   SpO2 98%   BMI 33.61 kg/m²   Physical Exam  Vitals and nursing note reviewed.   Constitutional:       Appearance: She is normal weight.   HENT:      Head: Normocephalic and atraumatic.      Nose: Nose normal.      Mouth/Throat:      Mouth: Mucous membranes are moist.   Eyes:      Extraocular Movements: Extraocular movements intact.      Pupils: Pupils are equal, round, and reactive to light.   Cardiovascular:      Rate and Rhythm: Normal rate and regular rhythm.      Pulses: Normal pulses.      Heart sounds: Normal heart sounds.   Pulmonary:      Effort: Pulmonary effort is normal.      Breath sounds: Normal breath sounds. "   Abdominal:      General: Abdomen is flat. Bowel sounds are normal.      Palpations: Abdomen is soft.   Musculoskeletal:         General: Normal range of motion.      Cervical back: Normal range of motion and neck supple.   Skin:     General: Skin is warm and dry.      Capillary Refill: Capillary refill takes less than 2 seconds.   Neurological:      General: No focal deficit present.      Mental Status: She is alert and oriented to person, place, and time. Mental status is at baseline.   Psychiatric:         Mood and Affect: Mood normal.         Behavior: Behavior normal.         Thought Content: Thought content normal.         Judgment: Judgment normal.     exam does confirm several ecchymotic areas on the extremities is various stages of resolution.    Assessment/Plan   Diagnoses and all orders for this visit:    1. Other fatigue (Primary)  -     CBC & Differential  -     Comprehensive metabolic panel  -     Lipid Panel With / Chol / HDL Ratio  -     TSH  -     T4, Free  -     T3, free  -     Thyroid Peroxidase Antibody  -     Thyroglobulin  -     JUSTUS  -     Urinalysis without microscopic (no culture) - Urine, Clean Catch  -     FSH & LH  -     Estrogens, Total    2. Insomnia, unspecified type  -     CBC & Differential  -     Comprehensive metabolic panel  -     Lipid Panel With / Chol / HDL Ratio  -     TSH  -     T4, Free  -     T3, free  -     Thyroid Peroxidase Antibody  -     Thyroglobulin  -     JUSTUS  -     Urinalysis without microscopic (no culture) - Urine, Clean Catch  -     FSH & LH  -     Estrogens, Total    3. Bruising  -     CBC & Differential  -     Comprehensive metabolic panel  -     Lipid Panel With / Chol / HDL Ratio  -     TSH  -     T4, Free  -     T3, free  -     Thyroid Peroxidase Antibody  -     Thyroglobulin  -     JUSTUS  -     Urinalysis without microscopic (no culture) - Urine, Clean Catch  -     FSH & LH  -     Estrogens, Total                 Orders Placed This Encounter   Procedures   •  Comprehensive metabolic panel     Order Specific Question:   Release to patient     Answer:   Immediate   • Lipid Panel With / Chol / HDL Ratio     Order Specific Question:   Release to patient     Answer:   Immediate   • TSH     Order Specific Question:   Release to patient     Answer:   Immediate   • T4, Free     Order Specific Question:   Release to patient     Answer:   Immediate   • T3, free     Order Specific Question:   Release to patient     Answer:   Immediate   • Thyroid Peroxidase Antibody     Order Specific Question:   Release to patient     Answer:   Immediate   • Thyroglobulin     Order Specific Question:   Release to patient     Answer:   Immediate   • JUSTUS     Order Specific Question:   Release to patient     Answer:   Immediate   • Urinalysis without microscopic (no culture) - Urine, Clean Catch     Order Specific Question:   Release to patient     Answer:   Immediate   • FSH & LH     Order Specific Question:   Release to patient     Answer:   Immediate   • Estrogens, Total     Order Specific Question:   Release to patient     Answer:   Immediate   • CBC & Differential       Follow up: 4 week(s)

## 2021-09-24 ENCOUNTER — LAB (OUTPATIENT)
Dept: FAMILY MEDICINE CLINIC | Facility: CLINIC | Age: 61
End: 2021-09-24

## 2021-09-28 DIAGNOSIS — F41.9 ANXIETY: ICD-10-CM

## 2021-09-28 RX ORDER — LORAZEPAM 1 MG/1
TABLET ORAL
Qty: 90 TABLET | Refills: 0 | Status: SHIPPED | OUTPATIENT
Start: 2021-09-28 | End: 2021-10-27

## 2021-10-02 LAB
ALBUMIN SERPL-MCNC: 4.3 G/DL (ref 3.8–4.9)
ALBUMIN/GLOB SERPL: 2.2 {RATIO} (ref 1.2–2.2)
ALP SERPL-CCNC: 76 IU/L (ref 44–121)
ALT SERPL-CCNC: 11 IU/L (ref 0–32)
ANA SER QL: POSITIVE
APPEARANCE UR: ABNORMAL
AST SERPL-CCNC: 20 IU/L (ref 0–40)
BASOPHILS # BLD AUTO: 0.1 X10E3/UL (ref 0–0.2)
BASOPHILS NFR BLD AUTO: 1 %
BILIRUB SERPL-MCNC: 0.5 MG/DL (ref 0–1.2)
BILIRUB UR QL STRIP: NEGATIVE
BUN SERPL-MCNC: 17 MG/DL (ref 6–24)
BUN/CREAT SERPL: 22 (ref 9–23)
CALCIUM SERPL-MCNC: 9.5 MG/DL (ref 8.7–10.2)
CHLORIDE SERPL-SCNC: 107 MMOL/L (ref 96–106)
CHOLEST SERPL-MCNC: 196 MG/DL (ref 100–199)
CHOLEST/HDLC SERPL: 2.6 RATIO (ref 0–4.4)
CO2 SERPL-SCNC: 22 MMOL/L (ref 20–29)
COLOR UR: YELLOW
CREAT SERPL-MCNC: 0.77 MG/DL (ref 0.57–1)
DSDNA AB SER-ACNC: <1 IU/ML (ref 0–9)
EOSINOPHIL # BLD AUTO: 0.2 X10E3/UL (ref 0–0.4)
EOSINOPHIL NFR BLD AUTO: 3 %
ERYTHROCYTE [DISTWIDTH] IN BLOOD BY AUTOMATED COUNT: 12.4 % (ref 11.7–15.4)
ESTROGEN SERPL-MCNC: 71 PG/ML
FSH SERPL-ACNC: 82.7 MIU/ML
GLOBULIN SER CALC-MCNC: 2 G/DL (ref 1.5–4.5)
GLUCOSE SERPL-MCNC: 92 MG/DL (ref 65–99)
GLUCOSE UR QL: NEGATIVE
HCT VFR BLD AUTO: 40.2 % (ref 34–46.6)
HDLC SERPL-MCNC: 76 MG/DL
HGB BLD-MCNC: 13.4 G/DL (ref 11.1–15.9)
HGB UR QL STRIP: ABNORMAL
IMM GRANULOCYTES # BLD AUTO: 0 X10E3/UL (ref 0–0.1)
IMM GRANULOCYTES NFR BLD AUTO: 0 %
KETONES UR QL STRIP: ABNORMAL
LDLC SERPL CALC-MCNC: 110 MG/DL (ref 0–99)
LEUKOCYTE ESTERASE UR QL STRIP: ABNORMAL
LH SERPL-ACNC: 50.7 MIU/ML
LYMPHOCYTES # BLD AUTO: 2.1 X10E3/UL (ref 0.7–3.1)
LYMPHOCYTES NFR BLD AUTO: 33 %
Lab: NORMAL
MCH RBC QN AUTO: 30 PG (ref 26.6–33)
MCHC RBC AUTO-ENTMCNC: 33.3 G/DL (ref 31.5–35.7)
MCV RBC AUTO: 90 FL (ref 79–97)
MONOCYTES # BLD AUTO: 0.4 X10E3/UL (ref 0.1–0.9)
MONOCYTES NFR BLD AUTO: 7 %
NEUTROPHILS # BLD AUTO: 3.5 X10E3/UL (ref 1.4–7)
NEUTROPHILS NFR BLD AUTO: 56 %
NITRITE UR QL STRIP: NEGATIVE
PH UR STRIP: 5 [PH] (ref 5–7.5)
PLATELET # BLD AUTO: 329 X10E3/UL (ref 150–450)
POTASSIUM SERPL-SCNC: 4.6 MMOL/L (ref 3.5–5.2)
PROT SERPL-MCNC: 6.3 G/DL (ref 6–8.5)
PROT UR QL STRIP: ABNORMAL
RBC # BLD AUTO: 4.47 X10E6/UL (ref 3.77–5.28)
SODIUM SERPL-SCNC: 142 MMOL/L (ref 134–144)
SP GR UR: 1.03 (ref 1–1.03)
T3FREE SERPL-MCNC: 2.8 PG/ML (ref 2–4.4)
T4 FREE SERPL-MCNC: 1.08 NG/DL (ref 0.82–1.77)
THYROGLOB SERPL-MCNC: 4.9 NG/ML
THYROPEROXIDASE AB SERPL-ACNC: <8 IU/ML (ref 0–34)
TRIGL SERPL-MCNC: 56 MG/DL (ref 0–149)
TSH SERPL DL<=0.005 MIU/L-ACNC: 2.32 UIU/ML (ref 0.45–4.5)
UROBILINOGEN UR STRIP-MCNC: 0.2 MG/DL (ref 0.2–1)
VLDLC SERPL CALC-MCNC: 10 MG/DL (ref 5–40)
WBC # BLD AUTO: 6.3 X10E3/UL (ref 3.4–10.8)

## 2021-10-11 ENCOUNTER — OFFICE VISIT (OUTPATIENT)
Dept: FAMILY MEDICINE CLINIC | Facility: CLINIC | Age: 61
End: 2021-10-11

## 2021-10-11 VITALS
BODY MASS INDEX: 33.66 KG/M2 | DIASTOLIC BLOOD PRESSURE: 74 MMHG | RESPIRATION RATE: 16 BRPM | HEIGHT: 65 IN | SYSTOLIC BLOOD PRESSURE: 124 MMHG | OXYGEN SATURATION: 97 % | HEART RATE: 75 BPM | WEIGHT: 202 LBS

## 2021-10-11 DIAGNOSIS — R30.0 DYSURIA: ICD-10-CM

## 2021-10-11 DIAGNOSIS — H65.21 RIGHT CHRONIC SEROUS OTITIS MEDIA: Primary | ICD-10-CM

## 2021-10-11 DIAGNOSIS — M25.551 HIP PAIN, RIGHT: ICD-10-CM

## 2021-10-11 PROCEDURE — 99213 OFFICE O/P EST LOW 20 MIN: CPT | Performed by: FAMILY MEDICINE

## 2021-10-11 NOTE — PROGRESS NOTES
Subjective   Radha Wolff is a 60 y.o. female.     Chief Complaint   Patient presents with   • Fatigue     4 week f/u & discuss lab results   • Earache     right ear       History of Present Illness     she is noting isues with fatigue and found to have menopausal symptoms--she is noting right hip pain      Current Outpatient Medications:   •  Ascorbic Acid (VITAMIN C PO), Take  by mouth Daily., Disp: , Rfl:   •  ascorbic acid (VITAMIN C) 100 MG tablet, , Disp: , Rfl:   •  cetirizine (zyrTEC) 10 MG tablet, Take 10 mg by mouth Daily., Disp: , Rfl:   •  Cholecalciferol (VITAMIN D3 PO), Take  by mouth Daily., Disp: , Rfl:   •  COLLAGEN PO, Take  by mouth Daily., Disp: , Rfl:   •  diphenhydrAMINE (BENADRYL) 25 mg capsule, Take 25 mg by mouth As Needed., Disp: , Rfl:   •  GLUCOSAMINE-CHONDROITIN-MSM PO, Take  by mouth Daily., Disp: , Rfl:   •  hydroCHLOROthiazide (MICROZIDE) 12.5 MG capsule, TAKE 1 CAPSULE DAILY, Disp: , Rfl:   •  KRILL OIL PO, Take  by mouth Daily., Disp: , Rfl:   •  LORazepam (ATIVAN) 1 MG tablet, TAKE ONE TABLET EVERY EIGHT HOURS AS NEEDED FOR ANXIETY GENERIC FOR ATIVAN , Disp: 90 tablet, Rfl: 0  •  MAGNESIUM PO, Take  by mouth 2 (two) times a day., Disp: , Rfl:   •  meclizine (ANTIVERT) 25 MG tablet, Take 25 mg by mouth., Disp: , Rfl:   •  melatonin 5 MG tablet tablet, Take 10 mg by mouth Every Night., Disp: , Rfl:   •  multivitamin with minerals tablet tablet, Take 1 tablet by mouth Daily., Disp: , Rfl:   •  Omega-3 Fatty Acids (FISH OIL PO), Take  by mouth Daily., Disp: , Rfl:   •  SELENIUM PO, , Disp: , Rfl:   •  TURMERIC PO, Take  by mouth Daily., Disp: , Rfl:   •  VITAMIN B COMPLEX-C PO, Take  by mouth Daily., Disp: , Rfl:   •  vitamin E 100 UNIT capsule, , Disp: , Rfl:   •  Zinc Sulfate (ZINC 15 PO), , Disp: , Rfl:   Allergies   Allergen Reactions   • Bee Venom Nausea And Vomiting   • Latex Hives     01/18/2005-Latex unspecified     • Levofloxacin Delirium   • Poison Ivy Extract Hives   • Red  "Dye Itching     06/22/2006-Skin itch     • Tetracycline Other (See Comments)     01/18/2005-Tetracycline intolerant     • Adhesive Tape Rash   • Tegaderm Ag Mesh [Silver] Rash   • Wasp Venom Rash       Past Medical History:   Diagnosis Date   • Anxiety    • Arthritis    • Dizzy spells    • Hematuria    • Osteoarthritis    • PMB (postmenopausal bleeding)      Past Surgical History:   Procedure Laterality Date   • ADENOIDECTOMY     • BILATERAL INSERTION OF EAR TUBES AND ADENOIDECTOMY     • LAPAROSCOPIC CHOLECYSTECTOMY     • TONSILLECTOMY         Review of Systems   Constitutional: Negative.    HENT: Positive for ear pain.    Eyes: Negative.    Respiratory: Negative.    Cardiovascular: Negative.    Gastrointestinal: Negative.    Endocrine: Negative.    Genitourinary: Negative.    Musculoskeletal: Positive for arthralgias.   Skin: Negative.    Allergic/Immunologic: Negative.    Neurological: Negative.    Hematological: Negative.    Psychiatric/Behavioral: Negative.        Objective  /74   Pulse 75   Resp 16   Ht 165.1 cm (65\")   Wt 91.6 kg (202 lb)   SpO2 97%   BMI 33.61 kg/m²   Physical Exam  Vitals and nursing note reviewed.   Constitutional:       Appearance: Normal appearance. She is normal weight.   HENT:      Head: Normocephalic.      Ears:      Comments: notd air fluid level on the right tm     Nose: Nose normal.      Mouth/Throat:      Mouth: Mucous membranes are moist.   Eyes:      Extraocular Movements: Extraocular movements intact.      Pupils: Pupils are equal, round, and reactive to light.   Cardiovascular:      Rate and Rhythm: Normal rate and regular rhythm.      Pulses: Normal pulses.      Heart sounds: Normal heart sounds.   Pulmonary:      Effort: Pulmonary effort is normal.      Breath sounds: Normal breath sounds.   Abdominal:      General: Abdomen is flat. Bowel sounds are normal.   Musculoskeletal:         General: Normal range of motion.      Cervical back: Normal range of motion and " neck supple.   Skin:     General: Skin is warm.      Capillary Refill: Capillary refill takes less than 2 seconds.   Neurological:      General: No focal deficit present.      Mental Status: She is alert and oriented to person, place, and time.   Psychiatric:         Mood and Affect: Mood normal.         Behavior: Behavior normal.         Thought Content: Thought content normal.         Judgment: Judgment normal.         Assessment/Plan   Diagnoses and all orders for this visit:    1. Right chronic serous otitis media (Primary)    2. Dysuria  -     Urinalysis With Microscopic - Urine, Clean Catch  -     Urine Culture - Urine, Urine, Clean Catch    3. Hip pain, right  -     Ambulatory Referral to Orthopedic Surgery      She declines ent appt at [facility] time--she will keep appt with ob/gyn           Orders Placed This Encounter   Procedures   • Urine Culture - Urine, Urine, Clean Catch     Order Specific Question:   Release to patient     Answer:   Immediate   • Ambulatory Referral to Orthopedic Surgery     Referral Priority:   Routine     Referral Type:   Consultation     Referral Reason:   Specialty Services Required     Requested Specialty:   Orthopedic Surgery     Number of Visits Requested:   1   • Urinalysis With Microscopic - Urine, Clean Catch     Order Specific Question:   Release to patient     Answer:   Immediate       Follow up: 2 month(s)

## 2021-10-13 LAB
APPEARANCE UR: CLEAR
BACTERIA #/AREA URNS HPF: NORMAL /HPF
BACTERIA UR CULT: NORMAL
BACTERIA UR CULT: NORMAL
BILIRUB UR QL STRIP: NEGATIVE
CASTS URNS MICRO: NORMAL
COLOR UR: YELLOW
EPI CELLS #/AREA URNS HPF: NORMAL /HPF
GLUCOSE UR QL: NEGATIVE
HGB UR QL STRIP: NEGATIVE
KETONES UR QL STRIP: NEGATIVE
LEUKOCYTE ESTERASE UR QL STRIP: ABNORMAL
NITRITE UR QL STRIP: NEGATIVE
PH UR STRIP: 6.5 [PH] (ref 5–8)
PROT UR QL STRIP: NEGATIVE
RBC #/AREA URNS HPF: NORMAL /HPF
SP GR UR: 1.01 (ref 1–1.03)
UROBILINOGEN UR STRIP-MCNC: ABNORMAL MG/DL
WBC #/AREA URNS HPF: NORMAL /HPF

## 2021-10-26 ENCOUNTER — OFFICE VISIT (OUTPATIENT)
Dept: OBSTETRICS AND GYNECOLOGY | Facility: CLINIC | Age: 61
End: 2021-10-26

## 2021-10-26 VITALS
DIASTOLIC BLOOD PRESSURE: 76 MMHG | HEIGHT: 65 IN | SYSTOLIC BLOOD PRESSURE: 132 MMHG | WEIGHT: 201 LBS | BODY MASS INDEX: 33.49 KG/M2

## 2021-10-26 DIAGNOSIS — Z12.4 SCREENING FOR CERVICAL CANCER: ICD-10-CM

## 2021-10-26 DIAGNOSIS — Z78.9 NON-SMOKER: ICD-10-CM

## 2021-10-26 DIAGNOSIS — R82.90 CLOUDY URINE: Primary | ICD-10-CM

## 2021-10-26 DIAGNOSIS — N89.8 VAGINAL DISCHARGE: ICD-10-CM

## 2021-10-26 DIAGNOSIS — Z78.0 MENOPAUSE: ICD-10-CM

## 2021-10-26 PROCEDURE — 87624 HPV HI-RISK TYP POOLED RSLT: CPT | Performed by: OBSTETRICS & GYNECOLOGY

## 2021-10-26 PROCEDURE — 87563 M. GENITALIUM AMP PROBE: CPT | Performed by: OBSTETRICS & GYNECOLOGY

## 2021-10-26 PROCEDURE — 87481 CANDIDA DNA AMP PROBE: CPT | Performed by: OBSTETRICS & GYNECOLOGY

## 2021-10-26 PROCEDURE — 87512 GARDNER VAG DNA QUANT: CPT | Performed by: OBSTETRICS & GYNECOLOGY

## 2021-10-26 PROCEDURE — G0123 SCREEN CERV/VAG THIN LAYER: HCPCS | Performed by: OBSTETRICS & GYNECOLOGY

## 2021-10-26 PROCEDURE — 87661 TRICHOMONAS VAGINALIS AMPLIF: CPT | Performed by: OBSTETRICS & GYNECOLOGY

## 2021-10-26 PROCEDURE — 87798 DETECT AGENT NOS DNA AMP: CPT | Performed by: OBSTETRICS & GYNECOLOGY

## 2021-10-26 PROCEDURE — 99396 PREV VISIT EST AGE 40-64: CPT | Performed by: OBSTETRICS & GYNECOLOGY

## 2021-10-26 RX ORDER — ESTROGEN,CON/M-PROGEST ACET 0.3-1.5MG
1 TABLET ORAL DAILY
Qty: 30 TABLET | Refills: 1 | Status: SHIPPED | OUTPATIENT
Start: 2021-10-26 | End: 2022-01-20

## 2021-10-26 RX ORDER — IBUPROFEN 800 MG/1
600 TABLET ORAL EVERY 8 HOURS PRN
COMMUNITY
End: 2022-06-29

## 2021-10-26 RX ORDER — PREDNISONE 10 MG/1
10 TABLET ORAL DAILY
COMMUNITY
End: 2021-11-29

## 2021-10-26 NOTE — PROGRESS NOTES
"Subjective   Chief Complaint   Patient presents with   • Gynecologic Exam     pt here for annual GYN exam. Last pap 10/23/20, normal.      Radha Wolff is a 60 y.o. year old  menopausal female presenting to be seen for her annual exam.  Overall, the patient reports that menopause has really set in this year and she is very uncomfortable with all the changes..  The patient denies any vaginal bleeding in the past 12 months. Hot flashes and night sweats are not a significant problem - instead she is cold all the time.    Patient c/o vaginal discharge that she \"cannot get rid of\", she \"does not feel like herself\", there is insomnia sometimes and then hypersomnolance other times.  Patient is irritable and cries all the time.  Patient has apathy and also reports that she has gained 20 pounds despite \"never having an appetite\".  She also reports brain fog.  Patient reports that she just \"broke down\" in Dr. Zazueta's office recently and that he did a whole panel of labs and just told her that it was normal menopause changes.    SEXUAL Hx:  She is sexually active.  In the past year there has not been new sexual partners.  She denies any pain and says that OTC lubricants are sufficient.  HEALTH Hx:  She exercises regularly: yes, walking an average of 2-2.5 miles per day.  The patient reports regular self breast exams: yes  She has noticed changes in height: no.    No Additional Complaints Reported    The following portions of the patient's history were reviewed and updated as appropriate:problem list, current medications, allergies, past family history, past medical history, past social history and past surgical history.    Social History    Tobacco Use      Smoking status: Never Smoker      Smokeless tobacco: Never Used    Review of Systems   Constitutional: Negative for activity change and unexpected weight change.   Respiratory: Negative for shortness of breath.    Cardiovascular: Negative for chest pain. " "  Gastrointestinal: Negative for abdominal pain, constipation and diarrhea.        Patient has never had a colonoscopy, and declines referral   Endocrine: Positive for cold intolerance. Negative for heat intolerance.   Genitourinary: Positive for vaginal discharge. Negative for difficulty urinating, dyspareunia, enuresis, pelvic pain and vaginal bleeding.   Psychiatric/Behavioral: Positive for sleep disturbance. The patient is nervous/anxious (takes lorazepam every night, 3 mg).          Objective   /76   Ht 165.1 cm (65\")   Wt 91.2 kg (201 lb)   BMI 33.45 kg/m²   Physical Exam  Vitals and nursing note reviewed. Exam conducted with a chaperone present.   Constitutional:       General: She is not in acute distress.     Appearance: She is well-developed.   HENT:      Head: Normocephalic and atraumatic.   Cardiovascular:      Rate and Rhythm: Normal rate and regular rhythm.      Heart sounds: No murmur heard.      Pulmonary:      Effort: Pulmonary effort is normal.      Breath sounds: Normal breath sounds.   Chest:   Breasts:      Right: No inverted nipple, mass or skin change.      Left: No inverted nipple, mass or skin change.       Abdominal:      General: There is no distension.      Palpations: Abdomen is soft.      Tenderness: There is no abdominal tenderness.   Genitourinary:     General: Normal vulva.      Exam position: Lithotomy position.      Labia:         Right: No tenderness or lesion.         Left: No tenderness or lesion.       Urethra: No prolapse.      Vagina: Normal. No vaginal discharge, tenderness or bleeding.      Cervix: No cervical motion tenderness, discharge or friability.      Adnexa:         Right: No tenderness or fullness.          Left: No tenderness or fullness.        Rectum: Normal. No external hemorrhoid or internal hemorrhoid. Normal anal tone.   Musculoskeletal:         General: Normal range of motion.      Cervical back: Normal range of motion and neck supple.   Skin:     " General: Skin is warm and dry.   Neurological:      Mental Status: She is alert and oriented to person, place, and time.   Psychiatric:         Behavior: Behavior normal.         Judgment: Judgment normal.              Assessment & Plan    Diagnoses and all orders for this visit:    1. Cloudy urine (Primary): Patient denies dysuria, but urine very turbid  -     Urinalysis With Microscopic If Indicated (No Culture) - Urine, Clean Catch  -     Urine Culture - Urine, Urine, Clean Catch    2. Menopause: Patient agreeable to trying HRT because she feels like she just cannot continue to feel the way she feels now.  Patient advised that she should notice significant improvement by two weeks.  -     estrogen, conjugated,-medroxyprogesterone (Prempro) 0.3-1.5 MG per tablet; Take 1 tablet by mouth Daily.  Dispense: 30 tablet; Refill: 1    3. BMI 33.0-33.9,adult: Weight loss encouraged.  Dietary changes discussed.  Already exercising regularly    4. Non-smoker    5. Vaginal discharge: Patient advised that she likely has atrophic vaginitis.  Estrogen vaginal cream discussed, but BV and yeast testing was added to PAP    6. Screening for cervical cancer: PAP collected  -     Liquid-based Pap Smear, Screening  -     HPV DNA Probe, Direct - ThinPrep Vial, Cervix  -     Trichomonas vaginalis, PCR - ThinPrep Vial, Cervix    Exam remarkable only for BMI.  Routine screening labs with PCP.  Patient with calcifications in right breast that appear to be stable, but is being followed with breast imaging every 6 months.  Patient declines referral for screening colonoscopy.  Return to office in 2 months for follow-up of new start HRT        This note was electronically signed.    Kalani Newsome MD  10/26/2021  15:19 CDT

## 2021-10-27 DIAGNOSIS — F41.9 ANXIETY: ICD-10-CM

## 2021-10-27 RX ORDER — LORAZEPAM 1 MG/1
TABLET ORAL
Qty: 90 TABLET | Refills: 0 | Status: SHIPPED | OUTPATIENT
Start: 2021-10-27 | End: 2021-11-23

## 2021-10-28 LAB
APPEARANCE UR: ABNORMAL
BACTERIA #/AREA URNS HPF: ABNORMAL /HPF
BACTERIA UR CULT: NORMAL
BACTERIA UR CULT: NORMAL
BILIRUB UR QL STRIP: NEGATIVE
CASTS URNS MICRO: ABNORMAL
COLOR UR: YELLOW
EPI CELLS #/AREA URNS HPF: ABNORMAL /HPF
GLUCOSE UR QL: NEGATIVE
HGB UR QL STRIP: ABNORMAL
HPV I/H RISK 4 DNA CVX QL PROBE+SIG AMP: NOT DETECTED
KETONES UR QL STRIP: ABNORMAL
LEUKOCYTE ESTERASE UR QL STRIP: ABNORMAL
NITRITE UR QL STRIP: NEGATIVE
PH UR STRIP: 5.5 [PH] (ref 5–8)
PROT UR QL STRIP: ABNORMAL
RBC #/AREA URNS HPF: ABNORMAL /HPF
SP GR UR: ABNORMAL (ref 1–1.03)
UROBILINOGEN UR STRIP-MCNC: ABNORMAL MG/DL
WBC #/AREA URNS HPF: ABNORMAL /HPF

## 2021-11-01 DIAGNOSIS — N76.0 BV (BACTERIAL VAGINOSIS): Primary | ICD-10-CM

## 2021-11-01 DIAGNOSIS — B96.89 BV (BACTERIAL VAGINOSIS): Primary | ICD-10-CM

## 2021-11-01 LAB
GEN CATEG CVX/VAG CYTO-IMP: NORMAL
LAB AP CASE REPORT: NORMAL
LAB AP GYN ADDITIONAL INFORMATION: NORMAL
LAB AP GYN OTHER FINDINGS: NORMAL
PATH INTERP SPEC-IMP: NORMAL
STAT OF ADQ CVX/VAG CYTO-IMP: NORMAL
TRICHOMONAS VAGINALIS PCR: NOT DETECTED

## 2021-11-01 RX ORDER — METRONIDAZOLE 500 MG/1
500 TABLET ORAL 2 TIMES DAILY
Qty: 14 TABLET | Refills: 0 | Status: SHIPPED | OUTPATIENT
Start: 2021-11-01 | End: 2021-11-08

## 2021-11-08 ENCOUNTER — APPOINTMENT (OUTPATIENT)
Dept: MAMMOGRAPHY | Facility: HOSPITAL | Age: 61
End: 2021-11-08

## 2021-11-08 ENCOUNTER — HOSPITAL ENCOUNTER (OUTPATIENT)
Dept: MAMMOGRAPHY | Facility: HOSPITAL | Age: 61
Discharge: HOME | End: 2021-11-08
Admitting: OBSTETRICS & GYNECOLOGY

## 2021-11-08 DIAGNOSIS — R92.8 FOLLOW-UP EXAMINATION OF ABNORMAL MAMMOGRAM: ICD-10-CM

## 2021-11-08 DIAGNOSIS — Z12.31 ENCOUNTER FOR SCREENING MAMMOGRAM FOR MALIGNANT NEOPLASM OF BREAST: ICD-10-CM

## 2021-11-08 PROCEDURE — 77066 DX MAMMO INCL CAD BI: CPT

## 2021-11-08 PROCEDURE — G0279 TOMOSYNTHESIS, MAMMO: HCPCS

## 2021-11-10 ENCOUNTER — PREP FOR SURGERY (OUTPATIENT)
Dept: OTHER | Facility: HOSPITAL | Age: 61
End: 2021-11-10

## 2021-11-10 ENCOUNTER — TELEPHONE (OUTPATIENT)
Dept: GASTROENTEROLOGY | Facility: CLINIC | Age: 61
End: 2021-11-10

## 2021-11-10 DIAGNOSIS — Z12.11 ENCOUNTER FOR SCREENING FOR MALIGNANT NEOPLASM OF COLON: Primary | ICD-10-CM

## 2021-11-12 PROBLEM — Z12.11 ENCOUNTER FOR SCREENING FOR MALIGNANT NEOPLASM OF COLON: Status: ACTIVE | Noted: 2021-11-12

## 2021-11-23 DIAGNOSIS — F41.9 ANXIETY: ICD-10-CM

## 2021-11-23 RX ORDER — LORAZEPAM 1 MG/1
TABLET ORAL
Qty: 90 TABLET | Refills: 0 | Status: SHIPPED | OUTPATIENT
Start: 2021-11-23 | End: 2021-12-27

## 2021-11-24 ENCOUNTER — APPOINTMENT (OUTPATIENT)
Dept: MAMMOGRAPHY | Facility: HOSPITAL | Age: 61
End: 2021-11-24

## 2021-11-29 ENCOUNTER — TELEPHONE (OUTPATIENT)
Dept: GASTROENTEROLOGY | Facility: CLINIC | Age: 61
End: 2021-11-29

## 2021-11-29 ENCOUNTER — OFFICE VISIT (OUTPATIENT)
Dept: FAMILY MEDICINE CLINIC | Facility: CLINIC | Age: 61
End: 2021-11-29

## 2021-11-29 VITALS
OXYGEN SATURATION: 97 % | HEART RATE: 83 BPM | TEMPERATURE: 97.5 F | WEIGHT: 201 LBS | RESPIRATION RATE: 16 BRPM | BODY MASS INDEX: 33.49 KG/M2 | DIASTOLIC BLOOD PRESSURE: 84 MMHG | HEIGHT: 65 IN | SYSTOLIC BLOOD PRESSURE: 134 MMHG

## 2021-11-29 DIAGNOSIS — J30.89 NON-SEASONAL ALLERGIC RHINITIS, UNSPECIFIED TRIGGER: ICD-10-CM

## 2021-11-29 DIAGNOSIS — J10.1 INFLUENZA B: ICD-10-CM

## 2021-11-29 DIAGNOSIS — R68.89 FLU-LIKE SYMPTOMS: Primary | ICD-10-CM

## 2021-11-29 LAB
EXPIRATION DATE: ABNORMAL
FLUAV AG NPH QL: NEGATIVE
FLUBV AG NPH QL: POSITIVE
INTERNAL CONTROL: ABNORMAL
Lab: ABNORMAL

## 2021-11-29 PROCEDURE — 87804 INFLUENZA ASSAY W/OPTIC: CPT | Performed by: NURSE PRACTITIONER

## 2021-11-29 PROCEDURE — 99213 OFFICE O/P EST LOW 20 MIN: CPT | Performed by: NURSE PRACTITIONER

## 2021-11-29 RX ORDER — OSELTAMIVIR PHOSPHATE 75 MG/1
75 CAPSULE ORAL 2 TIMES DAILY
Qty: 10 CAPSULE | Refills: 0 | Status: SHIPPED | OUTPATIENT
Start: 2021-11-29 | End: 2021-12-04

## 2021-11-29 RX ORDER — CETIRIZINE HYDROCHLORIDE, PSEUDOEPHEDRINE HYDROCHLORIDE 5; 120 MG/1; MG/1
1 TABLET, FILM COATED, EXTENDED RELEASE ORAL 2 TIMES DAILY
Qty: 20 TABLET | Refills: 0 | Status: SHIPPED | OUTPATIENT
Start: 2021-11-29 | End: 2022-01-20

## 2021-11-29 NOTE — PROGRESS NOTES
Subjective   Chief Complaint:  Fever, chills, cough    History of Present Illness:  This 60 y.o. female was seen in the office today.  She presents today with fever, chills and cough since last Thursday.  Reports had a negative Covid test yesterday.  Reports also having ear congestion and sinus congestion.    Allergies   Allergen Reactions   • Bee Venom Nausea And Vomiting   • Latex Hives     01/18/2005-Latex unspecified     • Levofloxacin Delirium   • Poison Ivy Extract Hives   • Red Dye Itching     06/22/2006-Skin itch     • Tetracycline Other (See Comments)     01/18/2005-Tetracycline intolerant     • Adhesive Tape Rash   • Tegaderm Ag Mesh [Silver] Rash   • Wasp Venom Rash      Current Outpatient Medications on File Prior to Visit   Medication Sig   • ascorbic acid (VITAMIN C) 100 MG tablet    • cetirizine (zyrTEC) 10 MG tablet Take 10 mg by mouth Daily.   • Cholecalciferol (VITAMIN D3 PO) Take  by mouth Daily.   • COLLAGEN PO Take  by mouth Daily.   • diphenhydrAMINE (BENADRYL) 25 mg capsule Take 25 mg by mouth As Needed.   • estrogen, conjugated,-medroxyprogesterone (Prempro) 0.3-1.5 MG per tablet Take 1 tablet by mouth Daily.   • GLUCOSAMINE-CHONDROITIN-MSM PO Take  by mouth Daily.   • hydroCHLOROthiazide (MICROZIDE) 12.5 MG capsule TAKE 1 CAPSULE DAILY   • ibuprofen (ADVIL,MOTRIN) 800 MG tablet Take 800 mg by mouth Every 8 (Eight) Hours As Needed for Mild Pain .   • KRILL OIL PO Take  by mouth Daily.   • LORazepam (ATIVAN) 1 MG tablet TAKE ONE TABLET EVERY EIGHT HOURS AS NEEDED FOR ANXIETY GENERIC FOR ATIVAN   • MAGNESIUM PO Take  by mouth 2 (two) times a day.   • meclizine (ANTIVERT) 25 MG tablet Take 25 mg by mouth.   • melatonin 5 MG tablet tablet Take 10 mg by mouth Every Night.   • multivitamin with minerals tablet tablet Take 1 tablet by mouth Daily.   • Omega-3 Fatty Acids (FISH OIL PO) Take  by mouth Daily.   • SELENIUM PO    • TURMERIC PO Take  by mouth Daily.   • VITAMIN B COMPLEX-C PO Take  by  mouth Daily.   • vitamin E 100 UNIT capsule    • Zinc Sulfate (ZINC 15 PO)      No current facility-administered medications on file prior to visit.      Past Medical, Surgical, Social, and Family History:  Past Medical History:   Diagnosis Date   • Anxiety    • Arthritis    • Dizzy spells    • Hematuria    • Osteoarthritis    • PMB (postmenopausal bleeding)      Past Surgical History:   Procedure Laterality Date   • ADENOIDECTOMY     • BILATERAL INSERTION OF EAR TUBES AND ADENOIDECTOMY     • LAPAROSCOPIC CHOLECYSTECTOMY     • TONSILLECTOMY       Social History     Socioeconomic History   • Marital status: Single   Tobacco Use   • Smoking status: Never Smoker   • Smokeless tobacco: Never Used   Substance and Sexual Activity   • Alcohol use: Not Currently   • Drug use: Never   • Sexual activity: Yes     Partners: Male     Family History   Problem Relation Age of Onset   • Lung cancer Mother    • Liver cancer Mother    • Lung cancer Father    • Obesity Brother    • Heart disease Brother    • Arthritis Brother    • No Known Problems Maternal Grandmother    • No Known Problems Paternal Grandmother    • No Known Problems Maternal Aunt    • No Known Problems Paternal Aunt    • No Known Problems Sister    • No Known Problems Daughter    • No Known Problems Son    • No Known Problems Other    • BRCA 1/2 Neg Hx    • Breast cancer Neg Hx    • Colon cancer Neg Hx    • Endometrial cancer Neg Hx    • Ovarian cancer Neg Hx    • Colon polyps Neg Hx    • Esophageal cancer Neg Hx      Objective   Physical Exam  Vitals reviewed.   Constitutional:       General: She is not in acute distress.     Appearance: Normal appearance. She is obese. She is ill-appearing.   HENT:      Nose: Congestion and rhinorrhea present.   Cardiovascular:      Rate and Rhythm: Normal rate and regular rhythm.   Pulmonary:      Effort: Pulmonary effort is normal. No respiratory distress.      Breath sounds: Normal breath sounds. No wheezing, rhonchi or rales.  "    /84   Pulse 83   Temp 97.5 °F (36.4 °C)   Resp 16   Ht 165.1 cm (65\")   Wt 91.2 kg (201 lb)   SpO2 97%   BMI 33.45 kg/m²     Lab, Imaging, and Diagnostic Results Review:  POC Influenza A Negative  POC Influenza B POSITIVE    Assessment/Plan   Diagnoses and all orders for this visit:    1. Flu-like symptoms (Primary)  -     POC Influenza A / B    2. Influenza B  -     oseltamivir (Tamiflu) 75 MG capsule; Take 1 capsule by mouth 2 (Two) Times a Day for 5 days.  Dispense: 10 capsule; Refill: 0    3. Non-seasonal allergic rhinitis, unspecified trigger  -     cetirizine-pseudoephedrine (ZyrTEC-D) 5-120 MG per 12 hr tablet; Take 1 tablet by mouth 2 (Two) Times a Day.  Dispense: 20 tablet; Refill: 0    Discussion:  Advised and educated plan of care.  Discussed benefits versus risks of mainly no benefits of starting Tamiflu at this point, she is wanting to go ahead and go for a round of Tamiflu.  Zyrtec-D sent in for sinuses, I advised her to hold her regular dosing of Zyrtec while on the Zyrtec-D.    Patient's Body mass index is 33.45 kg/m². indicating that she is obese (BMI >30). Obesity-related health conditions include the following: osteoarthritis. Obesity is unchanged. BMI is is above average; BMI management plan is completed. We discussed portion control and increasing exercise..    Follow-up:  Return if symptoms worsen or fail to improve.    Electronically signed by EILEEN Thompson, 11/29/21, 1:14 PM CST.  "

## 2021-12-27 DIAGNOSIS — F41.9 ANXIETY: ICD-10-CM

## 2021-12-27 RX ORDER — LORAZEPAM 1 MG/1
TABLET ORAL
Qty: 30 TABLET | Refills: 0 | Status: SHIPPED | OUTPATIENT
Start: 2021-12-27 | End: 2022-01-05

## 2022-01-05 DIAGNOSIS — F41.9 ANXIETY: ICD-10-CM

## 2022-01-05 RX ORDER — LORAZEPAM 1 MG/1
TABLET ORAL
Qty: 30 TABLET | Refills: 0 | Status: SHIPPED | OUTPATIENT
Start: 2022-01-05 | End: 2022-01-18 | Stop reason: SDUPTHER

## 2022-01-05 NOTE — TELEPHONE ENCOUNTER
Patient states her prescription for Lorazepam  had a quantity of #30, when it was sent in. She states the way she takes it daily would equal a monthly quantity of #90. She is wondering why this was sent in this way ? Please advise.       [facility] - [place] - [address] [facility] [phone] [facility] [phone] FX

## 2022-01-06 RX ORDER — LORAZEPAM 1 MG/1
1 TABLET ORAL EVERY 8 HOURS PRN
Qty: 90 TABLET | Refills: 0 | Status: CANCELLED | OUTPATIENT
Start: 2022-01-06

## 2022-01-06 NOTE — TELEPHONE ENCOUNTER
Pt already picked up the 30 tabs so she will call when she runs out and then we can fo the 90 tabs

## 2022-01-18 DIAGNOSIS — F41.9 ANXIETY: ICD-10-CM

## 2022-01-18 RX ORDER — LORAZEPAM 1 MG/1
1 TABLET ORAL EVERY 8 HOURS PRN
Qty: 90 TABLET | Refills: 0 | Status: ON HOLD | OUTPATIENT
Start: 2022-01-18 | End: 2022-02-18

## 2022-01-20 ENCOUNTER — TELEPHONE (OUTPATIENT)
Dept: GASTROENTEROLOGY | Facility: CLINIC | Age: 62
End: 2022-01-20

## 2022-01-20 ENCOUNTER — OFFICE VISIT (OUTPATIENT)
Dept: FAMILY MEDICINE CLINIC | Facility: CLINIC | Age: 62
End: 2022-01-20

## 2022-01-20 ENCOUNTER — TELEPHONE (OUTPATIENT)
Dept: FAMILY MEDICINE CLINIC | Facility: CLINIC | Age: 62
End: 2022-01-20

## 2022-01-20 VITALS
HEIGHT: 65 IN | WEIGHT: 201 LBS | TEMPERATURE: 97.3 F | HEART RATE: 75 BPM | SYSTOLIC BLOOD PRESSURE: 138 MMHG | OXYGEN SATURATION: 98 % | DIASTOLIC BLOOD PRESSURE: 82 MMHG | RESPIRATION RATE: 16 BRPM | BODY MASS INDEX: 33.49 KG/M2

## 2022-01-20 DIAGNOSIS — R10.84 GENERALIZED ABDOMINAL PAIN: Primary | ICD-10-CM

## 2022-01-20 PROCEDURE — 99213 OFFICE O/P EST LOW 20 MIN: CPT | Performed by: NURSE PRACTITIONER

## 2022-01-20 NOTE — TELEPHONE ENCOUNTER
Caller: Mariella Radha Jeannie    Relationship: Self    Best call back number: [phone]   What is the best time to reach you: ANY    Who are you requesting to speak with (clinical staff, provider,  specific staff member): CLNICAL    What was the call regarding: PATIENT WOULD LIKE AN UPDATE ON THE PRE AUTHORIZATION FOR HER CT SCAN. SHE STATES SHE WOULD LIKE TO HAVE THE SCAN DONE ASAP. PLEASE ADVISE.     Do you require a callback: YES, IF NEEDED

## 2022-01-20 NOTE — TELEPHONE ENCOUNTER
It looks like Jean just ordered this today for [employer]. They will preauthorize this. She is welcomed to call and get scheduled. The number is [phone]

## 2022-01-20 NOTE — TELEPHONE ENCOUNTER
I calld the pt and she stated this was supposed to be ordered at [facility] and pt stated hat she wants this done tomorrow!!

## 2022-01-20 NOTE — PROGRESS NOTES
Subjective   Chief Complaint:  Abdominal discomfort    History of Present Illness:  This 61 y.o. female was seen in the office today.  Abdominal pain got worse yesterday but reports has had pressure and pain for several months.  She is scheduled for colonoscopy tomorrow.  Reports had a 99 degree temperature and this was canceled.  Denies any other viral-like symptoms including cough, shortness of breath.    Allergies   Allergen Reactions   • Bee Venom Nausea And Vomiting   • Latex Hives     01/18/2005-Latex unspecified     • Levofloxacin Delirium   • Poison Ivy Extract Hives   • Red Dye Itching     06/22/2006-Skin itch     • Tetracycline Other (See Comments)     01/18/2005-Tetracycline intolerant     • Adhesive Tape Rash   • Tegaderm Ag Mesh [Silver] Rash   • Wasp Venom Rash      Current Outpatient Medications on File Prior to Visit   Medication Sig   • ascorbic acid (VITAMIN C) 100 MG tablet    • cetirizine (zyrTEC) 10 MG tablet Take 10 mg by mouth Daily.   • Cholecalciferol (VITAMIN D3 PO) Take  by mouth Daily.   • COLLAGEN PO Take  by mouth Daily.   • diphenhydrAMINE (BENADRYL) 25 mg capsule Take 25 mg by mouth As Needed.   • GLUCOSAMINE-CHONDROITIN-MSM PO Take  by mouth Daily.   • hydroCHLOROthiazide (MICROZIDE) 12.5 MG capsule TAKE 1 CAPSULE DAILY   • ibuprofen (ADVIL,MOTRIN) 800 MG tablet Take 800 mg by mouth Every 8 (Eight) Hours As Needed for Mild Pain .   • KRILL OIL PO Take  by mouth Daily.   • LORazepam (ATIVAN) 1 MG tablet Take 1 tablet by mouth Every 8 (Eight) Hours As Needed for Anxiety.   • MAGNESIUM PO Take  by mouth 2 (two) times a day.   • melatonin 5 MG tablet tablet Take 10 mg by mouth Every Night.   • multivitamin with minerals tablet tablet Take 1 tablet by mouth Daily.   • Omega-3 Fatty Acids (FISH OIL PO) Take  by mouth Daily.   • SELENIUM PO    • TURMERIC PO Take  by mouth Daily.   • VITAMIN B COMPLEX-C PO Take  by mouth Daily.   • vitamin E 100 UNIT capsule    • Zinc Sulfate (ZINC 15 PO)     • [DISCONTINUED] cetirizine-pseudoephedrine (ZyrTEC-D) 5-120 MG per 12 hr tablet Take 1 tablet by mouth 2 (Two) Times a Day.   • [DISCONTINUED] estrogen, conjugated,-medroxyprogesterone (Prempro) 0.3-1.5 MG per tablet Take 1 tablet by mouth Daily.   • meclizine (ANTIVERT) 25 MG tablet Take 25 mg by mouth.     No current facility-administered medications on file prior to visit.      Past Medical, Surgical, Social, and Family History:  Past Medical History:   Diagnosis Date   • Anxiety    • Arthritis    • Dizzy spells    • Hematuria    • Osteoarthritis    • PMB (postmenopausal bleeding)      Past Surgical History:   Procedure Laterality Date   • ADENOIDECTOMY     • BILATERAL INSERTION OF EAR TUBES AND ADENOIDECTOMY     • LAPAROSCOPIC CHOLECYSTECTOMY     • TONSILLECTOMY       Social History     Socioeconomic History   • Marital status: Single   Tobacco Use   • Smoking status: Never Smoker   • Smokeless tobacco: Never Used   Substance and Sexual Activity   • Alcohol use: Not Currently   • Drug use: Never   • Sexual activity: Yes     Partners: Male     Family History   Problem Relation Age of Onset   • Lung cancer Mother    • Liver cancer Mother    • Lung cancer Father    • Obesity Brother    • Heart disease Brother    • Arthritis Brother    • No Known Problems Maternal Grandmother    • No Known Problems Paternal Grandmother    • No Known Problems Maternal Aunt    • No Known Problems Paternal Aunt    • No Known Problems Sister    • No Known Problems Daughter    • No Known Problems Son    • No Known Problems Other    • BRCA 1/2 Neg Hx    • Breast cancer Neg Hx    • Colon cancer Neg Hx    • Endometrial cancer Neg Hx    • Ovarian cancer Neg Hx    • Colon polyps Neg Hx    • Esophageal cancer Neg Hx      Objective   Physical Exam  Vitals and nursing note reviewed.   Constitutional:       General: She is not in acute distress.     Appearance: Normal appearance. She is obese. She is not diaphoretic.   HENT:      Head:  "Normocephalic and atraumatic.      Nose: Nose normal.   Eyes:      Conjunctiva/sclera: Conjunctivae normal.      Pupils: Pupils are equal, round, and reactive to light.   Neck:      Thyroid: No thyromegaly.      Vascular: No JVD.      Trachea: No tracheal deviation.   Cardiovascular:      Rate and Rhythm: Normal rate and regular rhythm.      Heart sounds: Normal heart sounds. No murmur heard.  No friction rub. No gallop.    Pulmonary:      Effort: Pulmonary effort is normal. No respiratory distress.      Breath sounds: Normal breath sounds. No wheezing.   Abdominal:      General: Bowel sounds are normal. There is no distension.      Palpations: Abdomen is soft. There is no mass.      Tenderness: There is abdominal tenderness (*Generalized). There is no right CVA tenderness, left CVA tenderness, guarding or rebound.   Musculoskeletal:         General: No tenderness or deformity. Normal range of motion.      Cervical back: Normal range of motion and neck supple.   Lymphadenopathy:      Cervical: No cervical adenopathy.   Skin:     General: Skin is warm and dry.      Capillary Refill: Capillary refill takes less than 2 seconds.   Neurological:      Mental Status: She is alert and oriented to person, place, and time.   Psychiatric:         Behavior: Behavior normal.         Thought Content: Thought content normal.         Judgment: Judgment normal.     /82   Pulse 75   Temp 97.3 °F (36.3 °C)   Resp 16   Ht 165.1 cm (65\")   Wt 91.2 kg (201 lb)   SpO2 98%   BMI 33.45 kg/m²     Assessment/Plan   Diagnoses and all orders for this visit:    1. Generalized abdominal pain (Primary)  -     UA / M With / Rflx Culture([facility] ONLY) - Urine, Clean Catch  -     CBC & Differential  -     Comprehensive Metabolic Panel  -     C-reactive Protein  -     Sedimentation Rate  -     CT Abdomen Pelvis With Contrast; Future  -     Lipase  -     Amylase    Discussion:  Advised and educated plan of care.  Advised red flag symptoms " with abdominal pain including hard rigid abdomen-advised to go to the ER if this happens.  Otherwise we will proceed with outpatient work-up.    Follow-up:  Return for As Needed - Depending on Test Results - Will Call.    Electronically signed by EILEEN Thompson, 01/20/22, 11:21 AM CST.

## 2022-01-20 NOTE — TELEPHONE ENCOUNTER
PT called and said she wasn't feeling good and had a low grade temp.  PT cancelled her colon for tomorrow.

## 2022-01-21 NOTE — PROGRESS NOTES
Returned call to patient-had more questions.  She is actually going to follow-up with her gynecologist prior to proceeding with further testing.Advised also to get the colonoscopy that was previously scheduled-advised to reschedule.Electronically signed by EILEEN Thompson, 01/21/22, 3:45 PM CST.

## 2022-01-21 NOTE — PROGRESS NOTES
Please call the patient - Recommend Pelvic US, is she willing?  Needs to get at [facility] for comparison.    Electronically signed by EILEEN Thompson, 01/21/22, 2:55 PM CST.

## 2022-01-21 NOTE — TELEPHONE ENCOUNTER
Noted, call in couple of weeks to see if feeling better and ready to proceed with scopes. If not then she can call when feeling better and ready. Thank you

## 2022-01-22 LAB
ALBUMIN SERPL-MCNC: 4.6 G/DL (ref 3.5–5.2)
ALBUMIN/GLOB SERPL: 1.9 G/DL
ALP SERPL-CCNC: 96 U/L (ref 39–117)
ALT SERPL-CCNC: 16 U/L (ref 1–33)
AMYLASE SERPL-CCNC: 56 U/L (ref 28–100)
APPEARANCE UR: CLEAR
AST SERPL-CCNC: 20 U/L (ref 1–32)
BACTERIA #/AREA URNS HPF: NORMAL /HPF
BACTERIA UR CULT: NORMAL
BACTERIA UR CULT: NORMAL
BASOPHILS # BLD AUTO: 0.06 10*3/MM3 (ref 0–0.2)
BASOPHILS NFR BLD AUTO: 0.7 % (ref 0–1.5)
BILIRUB SERPL-MCNC: 0.5 MG/DL (ref 0–1.2)
BILIRUB UR QL STRIP: NEGATIVE
BUN SERPL-MCNC: 11 MG/DL (ref 6–20)
BUN/CREAT SERPL: 15.7 (ref 7–25)
CALCIUM SERPL-MCNC: 10.1 MG/DL (ref 8.6–10.5)
CASTS URNS QL MICRO: NORMAL /LPF
CHLORIDE SERPL-SCNC: 104 MMOL/L (ref 98–107)
CO2 SERPL-SCNC: 26.1 MMOL/L (ref 22–29)
COLOR UR: YELLOW
CREAT SERPL-MCNC: 0.7 MG/DL (ref 0.57–1)
CRP SERPL-MCNC: 0.45 MG/DL (ref 0–0.5)
EOSINOPHIL # BLD AUTO: 0.24 10*3/MM3 (ref 0–0.4)
EOSINOPHIL NFR BLD AUTO: 2.8 % (ref 0.3–6.2)
EPI CELLS #/AREA URNS HPF: NORMAL /HPF (ref 0–10)
ERYTHROCYTE [DISTWIDTH] IN BLOOD BY AUTOMATED COUNT: 12.1 % (ref 12.3–15.4)
ERYTHROCYTE [SEDIMENTATION RATE] IN BLOOD BY WESTERGREN METHOD: 15 MM/HR (ref 0–30)
GLOBULIN SER CALC-MCNC: 2.4 GM/DL
GLUCOSE SERPL-MCNC: 101 MG/DL (ref 65–99)
GLUCOSE UR QL STRIP: NEGATIVE
HCT VFR BLD AUTO: 44.8 % (ref 34–46.6)
HGB BLD-MCNC: 14.9 G/DL (ref 12–15.9)
HGB UR QL STRIP: NEGATIVE
IMM GRANULOCYTES # BLD AUTO: 0.01 10*3/MM3 (ref 0–0.05)
IMM GRANULOCYTES NFR BLD AUTO: 0.1 % (ref 0–0.5)
KETONES UR QL STRIP: NEGATIVE
LEUKOCYTE ESTERASE UR QL STRIP: ABNORMAL
LIPASE SERPL-CCNC: 22 U/L (ref 13–60)
LYMPHOCYTES # BLD AUTO: 2.51 10*3/MM3 (ref 0.7–3.1)
LYMPHOCYTES NFR BLD AUTO: 28.9 % (ref 19.6–45.3)
MCH RBC QN AUTO: 29.3 PG (ref 26.6–33)
MCHC RBC AUTO-ENTMCNC: 33.3 G/DL (ref 31.5–35.7)
MCV RBC AUTO: 88.2 FL (ref 79–97)
MICRO URNS: ABNORMAL
MONOCYTES # BLD AUTO: 0.52 10*3/MM3 (ref 0.1–0.9)
MONOCYTES NFR BLD AUTO: 6 % (ref 5–12)
NEUTROPHILS # BLD AUTO: 5.35 10*3/MM3 (ref 1.7–7)
NEUTROPHILS NFR BLD AUTO: 61.5 % (ref 42.7–76)
NITRITE UR QL STRIP: NEGATIVE
NRBC BLD AUTO-RTO: 0 /100 WBC (ref 0–0.2)
PH UR STRIP: 6 [PH] (ref 5–7.5)
PLATELET # BLD AUTO: 353 10*3/MM3 (ref 140–450)
POTASSIUM SERPL-SCNC: 4.7 MMOL/L (ref 3.5–5.2)
PROT SERPL-MCNC: 7 G/DL (ref 6–8.5)
PROT UR QL STRIP: NEGATIVE
RBC # BLD AUTO: 5.08 10*6/MM3 (ref 3.77–5.28)
RBC #/AREA URNS HPF: NORMAL /HPF (ref 0–2)
SODIUM SERPL-SCNC: 141 MMOL/L (ref 136–145)
SP GR UR STRIP: 1.01 (ref 1–1.03)
URINALYSIS REFLEX: ABNORMAL
UROBILINOGEN UR STRIP-MCNC: 0.2 MG/DL (ref 0.2–1)
WBC # BLD AUTO: 8.69 10*3/MM3 (ref 3.4–10.8)
WBC #/AREA URNS HPF: NORMAL /HPF (ref 0–5)

## 2022-02-16 ENCOUNTER — TELEPHONE (OUTPATIENT)
Dept: OBSTETRICS AND GYNECOLOGY | Facility: CLINIC | Age: 62
End: 2022-02-16

## 2022-02-16 NOTE — TELEPHONE ENCOUNTER
Attempted to call pt back to discuss symptoms and pt request for an appt.  Pt did not answer and no available voicemail option.

## 2022-02-18 ENCOUNTER — ANESTHESIA EVENT (OUTPATIENT)
Dept: GASTROENTEROLOGY | Facility: HOSPITAL | Age: 62
End: 2022-02-18

## 2022-02-18 ENCOUNTER — ANESTHESIA (OUTPATIENT)
Dept: GASTROENTEROLOGY | Facility: HOSPITAL | Age: 62
End: 2022-02-18

## 2022-02-18 ENCOUNTER — HOSPITAL ENCOUNTER (OUTPATIENT)
Facility: HOSPITAL | Age: 62
Setting detail: HOSPITAL OUTPATIENT SURGERY
Discharge: HOME | End: 2022-02-18
Attending: INTERNAL MEDICINE | Admitting: INTERNAL MEDICINE

## 2022-02-18 VITALS
HEART RATE: 82 BPM | OXYGEN SATURATION: 96 % | DIASTOLIC BLOOD PRESSURE: 74 MMHG | HEIGHT: 66 IN | TEMPERATURE: 97.5 F | SYSTOLIC BLOOD PRESSURE: 126 MMHG | WEIGHT: 205 LBS | RESPIRATION RATE: 18 BRPM | BODY MASS INDEX: 32.95 KG/M2

## 2022-02-18 DIAGNOSIS — F41.9 ANXIETY: ICD-10-CM

## 2022-02-18 DIAGNOSIS — Z12.11 ENCOUNTER FOR SCREENING FOR MALIGNANT NEOPLASM OF COLON: ICD-10-CM

## 2022-02-18 PROCEDURE — 88305 TISSUE EXAM BY PATHOLOGIST: CPT | Performed by: INTERNAL MEDICINE

## 2022-02-18 PROCEDURE — 25010000002 PROPOFOL 10 MG/ML EMULSION: Performed by: NURSE ANESTHETIST, CERTIFIED REGISTERED

## 2022-02-18 PROCEDURE — 45385 COLONOSCOPY W/LESION REMOVAL: CPT | Performed by: INTERNAL MEDICINE

## 2022-02-18 RX ORDER — SODIUM CHLORIDE 0.9 % (FLUSH) 0.9 %
10 SYRINGE (ML) INJECTION EVERY 12 HOURS SCHEDULED
Status: CANCELLED | OUTPATIENT
Start: 2022-02-18

## 2022-02-18 RX ORDER — PROPOFOL 10 MG/ML
VIAL (ML) INTRAVENOUS AS NEEDED
Status: DISCONTINUED | OUTPATIENT
Start: 2022-02-18 | End: 2022-02-18 | Stop reason: SURG

## 2022-02-18 RX ORDER — ONDANSETRON 2 MG/ML
4 INJECTION INTRAMUSCULAR; INTRAVENOUS ONCE AS NEEDED
Status: CANCELLED | OUTPATIENT
Start: 2022-02-18

## 2022-02-18 RX ORDER — LIDOCAINE HYDROCHLORIDE 20 MG/ML
INJECTION, SOLUTION EPIDURAL; INFILTRATION; INTRACAUDAL; PERINEURAL AS NEEDED
Status: DISCONTINUED | OUTPATIENT
Start: 2022-02-18 | End: 2022-02-18 | Stop reason: SURG

## 2022-02-18 RX ORDER — SODIUM CHLORIDE 9 MG/ML
500 INJECTION, SOLUTION INTRAVENOUS CONTINUOUS PRN
Status: DISCONTINUED | OUTPATIENT
Start: 2022-02-18 | End: 2022-02-18 | Stop reason: HOSPADM

## 2022-02-18 RX ORDER — ONDANSETRON 2 MG/ML
4 INJECTION INTRAMUSCULAR; INTRAVENOUS ONCE AS NEEDED
Status: DISCONTINUED | OUTPATIENT
Start: 2022-02-18 | End: 2022-02-18 | Stop reason: HOSPADM

## 2022-02-18 RX ORDER — SODIUM CHLORIDE 9 MG/ML
100 INJECTION, SOLUTION INTRAVENOUS CONTINUOUS
Status: CANCELLED | OUTPATIENT
Start: 2022-02-18

## 2022-02-18 RX ORDER — LORAZEPAM 1 MG/1
1 TABLET ORAL EVERY 8 HOURS PRN
Qty: 90 TABLET | Refills: 0 | Status: SHIPPED | OUTPATIENT
Start: 2022-02-18 | End: 2022-03-22

## 2022-02-18 RX ORDER — SODIUM CHLORIDE 0.9 % (FLUSH) 0.9 %
10 SYRINGE (ML) INJECTION AS NEEDED
Status: DISCONTINUED | OUTPATIENT
Start: 2022-02-18 | End: 2022-02-18 | Stop reason: HOSPADM

## 2022-02-18 RX ORDER — SODIUM CHLORIDE 0.9 % (FLUSH) 0.9 %
10 SYRINGE (ML) INJECTION AS NEEDED
Status: CANCELLED | OUTPATIENT
Start: 2022-02-18

## 2022-02-18 RX ADMIN — LIDOCAINE HYDROCHLORIDE 100 MG: 20 INJECTION, SOLUTION EPIDURAL; INFILTRATION; INTRACAUDAL; PERINEURAL at 13:15

## 2022-02-18 RX ADMIN — PROPOFOL 30 MG: 10 INJECTION, EMULSION INTRAVENOUS at 13:17

## 2022-02-18 RX ADMIN — PROPOFOL 30 MG: 10 INJECTION, EMULSION INTRAVENOUS at 13:27

## 2022-02-18 RX ADMIN — PROPOFOL 70 MG: 10 INJECTION, EMULSION INTRAVENOUS at 13:15

## 2022-02-18 RX ADMIN — PROPOFOL 30 MG: 10 INJECTION, EMULSION INTRAVENOUS at 13:25

## 2022-02-18 RX ADMIN — PROPOFOL 30 MG: 10 INJECTION, EMULSION INTRAVENOUS at 13:19

## 2022-02-18 RX ADMIN — PROPOFOL 30 MG: 10 INJECTION, EMULSION INTRAVENOUS at 13:23

## 2022-02-18 RX ADMIN — PROPOFOL 30 MG: 10 INJECTION, EMULSION INTRAVENOUS at 13:21

## 2022-02-18 RX ADMIN — PROPOFOL 30 MG: 10 INJECTION, EMULSION INTRAVENOUS at 13:16

## 2022-02-18 RX ADMIN — SODIUM CHLORIDE 500 ML: 9 INJECTION, SOLUTION INTRAVENOUS at 11:40

## 2022-02-18 NOTE — TELEPHONE ENCOUNTER
Last filled 1/18/2022      Rx Refill Note  Requested Prescriptions     Pending Prescriptions Disp Refills   • LORazepam (ATIVAN) 1 MG tablet [Pharmacy Med Name: LORAZEPAM 1MG TABLET] 90 tablet 0     Sig: TAKE 1 TABLET BY MOUTH EVERY 8 (EIGHT) HOURS AS NEEDED FOR ANXIETY.      Last office visit with prescribing clinician: 10/11/2021      Next office visit with prescribing clinician: Visit date not found            Alecia Martinez MA  02/18/22, 13:14 CST

## 2022-02-18 NOTE — ANESTHESIA POSTPROCEDURE EVALUATION
Patient: Radha Wolff    Procedure Summary     Date: 02/18/22 Room / Location: [facility] 6 /  [facility]    Anesthesia Start: 1303 Anesthesia Stop: 1333    Procedure: COLONOSCOPY WITH ANESTHESIA (N/A ) Diagnosis:       Encounter for screening for malignant neoplasm of colon      (Encounter for screening for malignant neoplasm of colon [Z12.11])    Surgeons: Bharat Vallejo MD Provider: Saran Dozier CRNA    Anesthesia Type: MAC ASA Status: 2          Anesthesia Type: MAC    Vitals  Vitals Value Taken Time   /113 02/18/22 1346   Temp     Pulse 81 02/18/22 1346   Resp 15 02/18/22 1340   SpO2 97 % 02/18/22 1346   Vitals shown include unvalidated device data.        Post Anesthesia Care and Evaluation    Patient location during evaluation: PHASE II  Patient participation: complete - patient participated  Level of consciousness: awake  Pain score: 0  Pain management: adequate  Airway patency: patent  Anesthetic complications: No anesthetic complications  PONV Status: none  Cardiovascular status: acceptable  Respiratory status: acceptable  Hydration status: acceptable

## 2022-02-18 NOTE — ANESTHESIA PREPROCEDURE EVALUATION
Anesthesia Evaluation     Patient summary reviewed   no history of anesthetic complications:  NPO Solid Status: > 8 hours  NPO Liquid Status: > 4 hours           Airway   Mallampati: I  TM distance: >3 FB  No difficulty expected  Dental - normal exam     Pulmonary - negative pulmonary ROS     ROS comment: snoring  Cardiovascular   Exercise tolerance: excellent (>7 METS)    (+) hypertension, hyperlipidemia,       Neuro/Psych  (+) psychiatric history Anxiety,    (-) seizures, TIA, CVA  GI/Hepatic/Renal/Endo    (-) liver disease, no renal disease, diabetes    Musculoskeletal     Abdominal    Substance History      OB/GYN          Other                        Anesthesia Plan    ASA 2     MAC       Anesthetic plan, all risks, benefits, and alternatives have been provided, discussed and informed consent has been obtained with: patient.        CODE STATUS:

## 2022-02-22 ENCOUNTER — TELEPHONE (OUTPATIENT)
Dept: OBSTETRICS AND GYNECOLOGY | Facility: CLINIC | Age: 62
End: 2022-02-22

## 2022-02-22 NOTE — TELEPHONE ENCOUNTER
Spoke with patient, patient offered different time. Patient declined appointment date and time. Patient sent to [facility]

## 2022-02-22 NOTE — TELEPHONE ENCOUNTER
Pt called to cancel her appt for today.  Pt requesting appt for another date with Dr Newsome.  Please call pt to reschedule appt.  Pt advised her next appt may be further out.

## 2022-02-25 ENCOUNTER — OFFICE VISIT (OUTPATIENT)
Dept: FAMILY MEDICINE CLINIC | Facility: CLINIC | Age: 62
End: 2022-02-25

## 2022-02-25 VITALS
RESPIRATION RATE: 20 BRPM | TEMPERATURE: 99 F | HEART RATE: 82 BPM | HEIGHT: 66 IN | BODY MASS INDEX: 35.03 KG/M2 | OXYGEN SATURATION: 98 % | SYSTOLIC BLOOD PRESSURE: 128 MMHG | WEIGHT: 218 LBS | DIASTOLIC BLOOD PRESSURE: 76 MMHG

## 2022-02-25 DIAGNOSIS — J01.90 ACUTE NON-RECURRENT SINUSITIS, UNSPECIFIED LOCATION: Primary | ICD-10-CM

## 2022-02-25 DIAGNOSIS — R68.89 FLU-LIKE SYMPTOMS: ICD-10-CM

## 2022-02-25 LAB
EXPIRATION DATE: NORMAL
FLUAV AG NPH QL: NEGATIVE
FLUBV AG NPH QL: NEGATIVE
INTERNAL CONTROL: NORMAL
Lab: NORMAL

## 2022-02-25 PROCEDURE — 87804 INFLUENZA ASSAY W/OPTIC: CPT | Performed by: NURSE PRACTITIONER

## 2022-02-25 PROCEDURE — 99213 OFFICE O/P EST LOW 20 MIN: CPT | Performed by: NURSE PRACTITIONER

## 2022-02-25 RX ORDER — AMOXICILLIN AND CLAVULANATE POTASSIUM 875; 125 MG/1; MG/1
1 TABLET, FILM COATED ORAL 2 TIMES DAILY
Qty: 20 TABLET | Refills: 0 | Status: SHIPPED | OUTPATIENT
Start: 2022-02-25 | End: 2022-03-07

## 2022-02-25 RX ORDER — FLUCONAZOLE 150 MG/1
150 TABLET ORAL ONCE
Qty: 1 TABLET | Refills: 0 | Status: SHIPPED | OUTPATIENT
Start: 2022-02-25 | End: 2022-02-25

## 2022-02-25 NOTE — PROGRESS NOTES
Subjective   Chief Complaint:  Nasal congestion ear pain    History of Present Illness:  This 61 y.o. female was seen in the office today.  Just an ear pain, body aches, sore throat with an acute onset today-reports negative Covid test already.    Allergies   Allergen Reactions   • Bee Venom Nausea And Vomiting   • Latex Hives     01/18/2005-Latex unspecified     • Levofloxacin Delirium   • Poison Ivy Extract Hives   • Red Dye Itching     06/22/2006-Skin itch     • Tetracycline Other (See Comments)     01/18/2005-Tetracycline intolerant     • Adhesive Tape Rash   • Tegaderm Ag Mesh [Silver] Rash   • Wasp Venom Rash      Current Outpatient Medications on File Prior to Visit   Medication Sig   • ascorbic acid (VITAMIN C) 100 MG tablet    • cetirizine (zyrTEC) 10 MG tablet Take 10 mg by mouth Daily.   • Cholecalciferol (VITAMIN D3 PO) Take  by mouth Daily.   • COLLAGEN PO Take  by mouth Daily.   • diphenhydrAMINE (BENADRYL) 25 mg capsule Take 25 mg by mouth As Needed.   • GLUCOSAMINE-CHONDROITIN-MSM PO Take  by mouth Daily.   • hydroCHLOROthiazide (MICROZIDE) 12.5 MG capsule TAKE 1 CAPSULE DAILY   • ibuprofen (ADVIL,MOTRIN) 800 MG tablet Take 600 mg by mouth Every 8 (Eight) Hours As Needed for Mild Pain .   • KRILL OIL PO Take  by mouth Daily.   • LORazepam (ATIVAN) 1 MG tablet TAKE 1 TABLET BY MOUTH EVERY 8 (EIGHT) HOURS AS NEEDED FOR ANXIETY.   • MAGNESIUM PO Take  by mouth 2 (two) times a day.   • meclizine (ANTIVERT) 25 MG tablet Take 25 mg by mouth.   • melatonin 5 MG tablet tablet Take 10 mg by mouth Every Night.   • multivitamin with minerals tablet tablet Take 1 tablet by mouth Daily.   • Omega-3 Fatty Acids (FISH OIL PO) Take  by mouth Daily.   • SELENIUM PO    • TURMERIC PO Take  by mouth Daily.   • VITAMIN B COMPLEX-C PO Take  by mouth Daily.   • vitamin E 100 UNIT capsule    • Zinc Sulfate (ZINC 15 PO)      No current facility-administered medications on file prior to visit.      Past Medical, Surgical,  Social, and Family History:  Past Medical History:   Diagnosis Date   • Anxiety    • Arthritis    • Dizzy spells    • Elevated cholesterol    • Hematuria    • Hypertension    • Osteoarthritis    • Pancreatitis     2007   • PMB (postmenopausal bleeding)      Past Surgical History:   Procedure Laterality Date   • ADENOIDECTOMY     • BILATERAL INSERTION OF EAR TUBES AND ADENOIDECTOMY     • COLONOSCOPY N/A 2/18/2022    Procedure: COLONOSCOPY WITH ANESTHESIA;  Surgeon: Bharat Vallejo MD;  Location: [facility] ENDOSCOPY;  Service: Gastroenterology;  Laterality: N/A;  pre screen  post; polyps   Daren Zazueta MD   • LAPAROSCOPIC CHOLECYSTECTOMY     • TONSILLECTOMY       Social History     Socioeconomic History   • Marital status: Single   Tobacco Use   • Smoking status: Never Smoker   • Smokeless tobacco: Never Used   Substance and Sexual Activity   • Alcohol use: Not Currently   • Drug use: Never   • Sexual activity: Yes     Partners: Male     Family History   Problem Relation Age of Onset   • Lung cancer Mother    • Liver cancer Mother    • Lung cancer Father    • Obesity Brother    • Heart disease Brother    • Arthritis Brother    • No Known Problems Maternal Grandmother    • No Known Problems Paternal Grandmother    • No Known Problems Maternal Aunt    • No Known Problems Paternal Aunt    • No Known Problems Sister    • No Known Problems Daughter    • No Known Problems Son    • No Known Problems Other    • BRCA 1/2 Neg Hx    • Breast cancer Neg Hx    • Colon cancer Neg Hx    • Endometrial cancer Neg Hx    • Ovarian cancer Neg Hx    • Colon polyps Neg Hx    • Esophageal cancer Neg Hx      Objective   Physical Exam  Vitals reviewed.   Constitutional:       General: She is not in acute distress.     Appearance: Normal appearance.   HENT:      Right Ear: A middle ear effusion is present. Tympanic membrane is not perforated.      Left Ear: A middle ear effusion is present. Tympanic membrane is not perforated.       "Nose: Congestion and rhinorrhea present.      Mouth/Throat:      Pharynx: Oropharyngeal exudate and posterior oropharyngeal erythema present.   Cardiovascular:      Rate and Rhythm: Normal rate and regular rhythm.   Pulmonary:      Effort: Pulmonary effort is normal.      Breath sounds: Normal breath sounds.     /76   Pulse 82   Temp 99 °F (37.2 °C)   Resp 20   Ht 167.6 cm (66\")   Wt 98.9 kg (218 lb)   SpO2 98%   BMI 35.19 kg/m²     Lab, Imaging, and Diagnostic Results Review:  POC Influenza A Negative  POC Influenza B Negative    Assessment/Plan   Diagnoses and all orders for this visit:    1. Acute non-recurrent sinusitis, unspecified location (Primary)  -     amoxicillin-clavulanate (Augmentin) 875-125 MG per tablet; Take 1 tablet by mouth 2 (Two) Times a Day for 10 days.  Dispense: 20 tablet; Refill: 0    2. Flu-like symptoms  -     POCT Influenza A/B    Other orders  -     fluconazole (Diflucan) 150 MG tablet; Take 1 tablet by mouth 1 (One) Time for 1 dose.  Dispense: 1 tablet; Refill: 0    Discussion:  Advised and educated plan of care.      Follow-up:  Return if symptoms worsen or fail to improve.    Electronically signed by EILEEN Thompson, 02/25/22, 3:31 PM CST.  "

## 2022-02-28 LAB
CYTO UR: NORMAL
LAB AP CASE REPORT: NORMAL
PATH REPORT.FINAL DX SPEC: NORMAL
PATH REPORT.GROSS SPEC: NORMAL

## 2022-03-07 ENCOUNTER — OFFICE VISIT (OUTPATIENT)
Dept: OBSTETRICS AND GYNECOLOGY | Facility: CLINIC | Age: 62
End: 2022-03-07

## 2022-03-07 VITALS
DIASTOLIC BLOOD PRESSURE: 78 MMHG | WEIGHT: 202 LBS | BODY MASS INDEX: 32.47 KG/M2 | HEIGHT: 66 IN | SYSTOLIC BLOOD PRESSURE: 130 MMHG

## 2022-03-07 DIAGNOSIS — R92.1 BREAST CALCIFICATION SEEN ON MAMMOGRAM: ICD-10-CM

## 2022-03-07 DIAGNOSIS — N64.4 BREAST TENDERNESS IN FEMALE: Primary | ICD-10-CM

## 2022-03-07 PROCEDURE — 99213 OFFICE O/P EST LOW 20 MIN: CPT | Performed by: NURSE PRACTITIONER

## 2022-03-22 DIAGNOSIS — F41.9 ANXIETY: ICD-10-CM

## 2022-03-22 RX ORDER — LORAZEPAM 1 MG/1
1 TABLET ORAL EVERY 8 HOURS PRN
Qty: 90 TABLET | Refills: 0 | Status: SHIPPED | OUTPATIENT
Start: 2022-03-22 | End: 2022-04-21

## 2022-04-10 NOTE — PROGRESS NOTES
Subjective   Radha Wolff is a 61 y.o. female  YOB: 1960      Chief Complaint   Patient presents with   • Breast Problem     Patient is here to follow up after having a bruised right breast that happened about a month ago. Patient states she is not sure if the bruising has gotten better since last being seen.       Breast Problem  This is a new problem. The current episode started 1 to 4 weeks ago. The problem has been gradually improving. Pertinent negatives include no abdominal pain, arthralgias, chest pain, chills, congestion, coughing, diaphoresis, fatigue, fever, headaches, joint swelling, myalgias, nausea, neck pain, numbness, rash, sore throat, vomiting or weakness.       The following portions of the patient's history were reviewed and updated as appropriate: allergies, current medications, past family history, past medical history, past social history, past surgical history, and problem list.    Allergies   Allergen Reactions   • Bee Venom Nausea And Vomiting   • Latex Hives     01/18/2005-Latex unspecified     • Levofloxacin Delirium   • Poison Ivy Extract Hives   • Red Dye Itching     06/22/2006-Skin itch     • Tetracycline Other (See Comments)     01/18/2005-Tetracycline intolerant     • Adhesive Tape Rash   • Tegaderm Ag Mesh [Silver] Rash   • Wasp Venom Rash       Past Medical History:   Diagnosis Date   • Anxiety    • Arthritis    • Dizzy spells    • Elevated cholesterol    • Hematuria    • Hypertension    • Osteoarthritis    • Pancreatitis     2007   • PMB (postmenopausal bleeding)        Family History   Problem Relation Age of Onset   • Lung cancer Mother    • Liver cancer Mother    • Lung cancer Father    • Obesity Brother    • Heart disease Brother    • Arthritis Brother    • No Known Problems Maternal Grandmother    • No Known Problems Paternal Grandmother    • No Known Problems Maternal Aunt    • No Known Problems Paternal Aunt    • No Known Problems Sister    • No  Known Problems Daughter    • No Known Problems Son    • No Known Problems Other    • BRCA 1/2 Neg Hx    • Breast cancer Neg Hx    • Colon cancer Neg Hx    • Endometrial cancer Neg Hx    • Ovarian cancer Neg Hx    • Colon polyps Neg Hx    • Esophageal cancer Neg Hx        Social History     Socioeconomic History   • Marital status: Single   Tobacco Use   • Smoking status: Never Smoker   • Smokeless tobacco: Never Used   Substance and Sexual Activity   • Alcohol use: Not Currently   • Drug use: Never   • Sexual activity: Yes     Partners: Male         Current Outpatient Medications:   •  ascorbic acid (VITAMIN C) 100 MG tablet, , Disp: , Rfl:   •  cetirizine (zyrTEC) 10 MG tablet, Take 10 mg by mouth Daily., Disp: , Rfl:   •  Cholecalciferol (VITAMIN D3 PO), Take  by mouth Daily., Disp: , Rfl:   •  COLLAGEN PO, Take  by mouth Daily., Disp: , Rfl:   •  diphenhydrAMINE (BENADRYL) 25 mg capsule, Take 25 mg by mouth As Needed., Disp: , Rfl:   •  GLUCOSAMINE-CHONDROITIN-MSM PO, Take  by mouth Daily., Disp: , Rfl:   •  hydroCHLOROthiazide (MICROZIDE) 12.5 MG capsule, TAKE 1 CAPSULE DAILY, Disp: , Rfl:   •  ibuprofen (ADVIL,MOTRIN) 800 MG tablet, Take 600 mg by mouth Every 8 (Eight) Hours As Needed for Mild Pain ., Disp: , Rfl:   •  KRILL OIL PO, Take  by mouth Daily., Disp: , Rfl:   •  MAGNESIUM PO, Take  by mouth 2 (two) times a day., Disp: , Rfl:   •  meclizine (ANTIVERT) 25 MG tablet, Take 25 mg by mouth., Disp: , Rfl:   •  melatonin 5 MG tablet tablet, Take 10 mg by mouth Every Night., Disp: , Rfl:   •  multivitamin with minerals tablet tablet, Take 1 tablet by mouth Daily., Disp: , Rfl:   •  Omega-3 Fatty Acids (FISH OIL PO), Take  by mouth Daily., Disp: , Rfl:   •  SELENIUM PO, , Disp: , Rfl:   •  TURMERIC PO, Take  by mouth Daily., Disp: , Rfl:   •  VITAMIN B COMPLEX-C PO, Take  by mouth Daily., Disp: , Rfl:   •  vitamin E 100 UNIT capsule, , Disp: , Rfl:   •  Zinc Sulfate (ZINC 15 PO), , Disp: , Rfl:   •  LORazepam  (ATIVAN) 1 MG tablet, TAKE 1 TABLET BY MOUTH EVERY 8 (EIGHT) HOURS AS NEEDED FOR ANXIETY., Disp: 90 tablet, Rfl: 0    No LMP recorded. Patient is postmenopausal.    Sexual History:         Could not be calculated    Past Surgical History:   Procedure Laterality Date   • ADENOIDECTOMY     • BILATERAL INSERTION OF EAR TUBES AND ADENOIDECTOMY     • COLONOSCOPY N/A 2/18/2022    Procedure: COLONOSCOPY WITH ANESTHESIA;  Surgeon: Bharat Vallejo MD;  Location: [facility] ENDOSCOPY;  Service: Gastroenterology;  Laterality: N/A;  pre screen  post; polyps   Daren Zazueta MD   • LAPAROSCOPIC CHOLECYSTECTOMY     • TONSILLECTOMY         Review of Systems   Constitutional: Negative for activity change, appetite change, chills, diaphoresis, fatigue, fever and unexpected weight change.   HENT: Negative for congestion, dental problem, drooling, ear discharge, ear pain, facial swelling, hearing loss, mouth sores, nosebleeds, postnasal drip, rhinorrhea, sinus pressure, sinus pain, sneezing, sore throat, tinnitus, trouble swallowing and voice change.    Eyes: Negative for photophobia, pain, discharge, redness, itching and visual disturbance.   Respiratory: Negative for apnea, cough, choking, chest tightness, shortness of breath, wheezing and stridor.    Cardiovascular: Negative for chest pain, palpitations and leg swelling.   Gastrointestinal: Negative for abdominal distention, abdominal pain, anal bleeding, blood in stool, constipation, diarrhea, nausea, rectal pain and vomiting.   Endocrine: Negative for cold intolerance, heat intolerance, polydipsia, polyphagia and polyuria.   Genitourinary: Negative for decreased urine volume, difficulty urinating, dyspareunia, dysuria, enuresis, flank pain, frequency, genital sores, hematuria, menstrual problem, pelvic pain, urgency, vaginal bleeding, vaginal discharge and vaginal pain.        Right breast tenderness and bruising   Musculoskeletal: Negative for arthralgias, back pain, gait  "problem, joint swelling, myalgias, neck pain and neck stiffness.   Skin: Negative for color change, pallor, rash and wound.   Allergic/Immunologic: Negative for environmental allergies, food allergies and immunocompromised state.   Neurological: Negative for dizziness, tremors, seizures, syncope, facial asymmetry, speech difficulty, weakness, light-headedness, numbness and headaches.   Hematological: Negative for adenopathy. Does not bruise/bleed easily.   Psychiatric/Behavioral: Negative for agitation, behavioral problems, confusion, decreased concentration, dysphoric mood, hallucinations, self-injury, sleep disturbance and suicidal ideas. The patient is not nervous/anxious and is not hyperactive.        Objective   Physical Exam  Vitals and nursing note reviewed.   Constitutional:       Appearance: She is well-developed.   HENT:      Head: Normocephalic.   Eyes:      Pupils: Pupils are equal, round, and reactive to light.   Cardiovascular:      Rate and Rhythm: Normal rate and regular rhythm.   Pulmonary:      Effort: Pulmonary effort is normal.      Breath sounds: Normal breath sounds.   Chest:       Abdominal:      Palpations: Abdomen is soft.   Musculoskeletal:         General: Normal range of motion.      Cervical back: Normal range of motion.   Skin:     General: Skin is warm and dry.   Neurological:      Mental Status: She is alert and oriented to person, place, and time.   Psychiatric:         Behavior: Behavior normal.           Vitals:    03/07/22 0932   BP: 130/78   Weight: 91.6 kg (202 lb)   Height: 167.6 cm (66\")       Diagnoses and all orders for this visit:    1. Breast tenderness in female (Primary)  Comments:  Patient reports continued breast tenderness. Bruising has improved.  Breast exam unremarkable.  Patient wants to pursue imaging.  Right breast mammogram ordered with ultrasound if needed.  Follow-up pending results.  Orders:  -     US breast right complete; Future  -     Mammo Diagnostic " Right With CAD; Future    2. Breast calcification seen on mammogram  -     US breast right complete; Future  -     Mammo Diagnostic Right With CAD; Future          Patient's Body mass index is 32.6 kg/m². indicating that she is obese (BMI >30). Obesity-related health conditions include the following: none. Obesity is unchanged. BMI is is above average; BMI management plan is completed. We discussed portion control and increasing exercise..             Non-Smoker    MyChart Instructions Given

## 2022-04-11 ENCOUNTER — HOSPITAL ENCOUNTER (OUTPATIENT)
Dept: MAMMOGRAPHY | Facility: HOSPITAL | Age: 62
Discharge: HOME | End: 2022-04-11

## 2022-04-11 ENCOUNTER — HOSPITAL ENCOUNTER (OUTPATIENT)
Dept: ULTRASOUND IMAGING | Facility: HOSPITAL | Age: 62
Discharge: HOME | End: 2022-04-11

## 2022-04-11 DIAGNOSIS — N64.4 BREAST TENDERNESS IN FEMALE: ICD-10-CM

## 2022-04-11 DIAGNOSIS — R92.1 BREAST CALCIFICATION SEEN ON MAMMOGRAM: ICD-10-CM

## 2022-04-11 PROCEDURE — 77065 DX MAMMO INCL CAD UNI: CPT

## 2022-04-11 PROCEDURE — G0279 TOMOSYNTHESIS, MAMMO: HCPCS

## 2022-04-19 RX ORDER — HYDROCHLOROTHIAZIDE 12.5 MG/1
12.5 CAPSULE, GELATIN COATED ORAL DAILY
Qty: 90 CAPSULE | Refills: 1 | Status: SHIPPED | OUTPATIENT
Start: 2022-04-19 | End: 2022-10-11

## 2022-04-21 DIAGNOSIS — F41.9 ANXIETY: ICD-10-CM

## 2022-04-21 RX ORDER — LORAZEPAM 1 MG/1
1 TABLET ORAL EVERY 8 HOURS PRN
Qty: 90 TABLET | Refills: 0 | Status: SHIPPED | OUTPATIENT
Start: 2022-04-21 | End: 2022-05-23

## 2022-05-23 DIAGNOSIS — F41.9 ANXIETY: ICD-10-CM

## 2022-05-23 RX ORDER — LORAZEPAM 1 MG/1
1 TABLET ORAL EVERY 8 HOURS PRN
Qty: 90 TABLET | Refills: 0 | Status: SHIPPED | OUTPATIENT
Start: 2022-05-23 | End: 2022-06-22

## 2022-06-22 DIAGNOSIS — F41.9 ANXIETY: ICD-10-CM

## 2022-06-22 RX ORDER — LORAZEPAM 1 MG/1
1 TABLET ORAL EVERY 8 HOURS PRN
Qty: 90 TABLET | Refills: 0 | Status: SHIPPED | OUTPATIENT
Start: 2022-06-22 | End: 2022-07-21

## 2022-06-29 ENCOUNTER — OFFICE VISIT (OUTPATIENT)
Dept: FAMILY MEDICINE CLINIC | Facility: CLINIC | Age: 62
End: 2022-06-29

## 2022-06-29 VITALS
HEIGHT: 66 IN | HEART RATE: 78 BPM | WEIGHT: 201 LBS | DIASTOLIC BLOOD PRESSURE: 75 MMHG | BODY MASS INDEX: 32.3 KG/M2 | OXYGEN SATURATION: 97 % | SYSTOLIC BLOOD PRESSURE: 124 MMHG | RESPIRATION RATE: 14 BRPM

## 2022-06-29 DIAGNOSIS — R68.89 COLD INTOLERANCE: ICD-10-CM

## 2022-06-29 DIAGNOSIS — I83.813 VARICOSE VEINS OF BOTH LOWER EXTREMITIES WITH PAIN: Primary | ICD-10-CM

## 2022-06-29 PROCEDURE — 99213 OFFICE O/P EST LOW 20 MIN: CPT | Performed by: FAMILY MEDICINE

## 2022-06-29 NOTE — PROGRESS NOTES
Subjective   Radha Wolff is a 61 y.o. female.     Chief Complaint   Patient presents with   • Varicose Veins     Patient states that her veins in her legs are beginning to get painful and she states that her legs are swelling and very tender.   • cold intolerance       History of Present Illness     as above      Current Outpatient Medications:   •  ascorbic acid (VITAMIN C) 100 MG tablet, , Disp: , Rfl:   •  cetirizine (zyrTEC) 10 MG tablet, Take 10 mg by mouth Daily., Disp: , Rfl:   •  Cholecalciferol (VITAMIN D3 PO), Take  by mouth Daily., Disp: , Rfl:   •  diphenhydrAMINE (BENADRYL) 25 mg capsule, Take 25 mg by mouth As Needed., Disp: , Rfl:   •  hydroCHLOROthiazide (MICROZIDE) 12.5 MG capsule, Take 1 capsule by mouth Daily., Disp: 90 capsule, Rfl: 1  •  KRILL OIL PO, Take  by mouth Daily., Disp: , Rfl:   •  LORazepam (ATIVAN) 1 MG tablet, TAKE 1 TABLET BY MOUTH EVERY 8 (EIGHT) HOURS AS NEEDED FOR ANXIETY., Disp: 90 tablet, Rfl: 0  •  MAGNESIUM PO, Take  by mouth 2 (two) times a day., Disp: , Rfl:   •  meclizine (ANTIVERT) 25 MG tablet, Take 25 mg by mouth., Disp: , Rfl:   •  melatonin 5 MG tablet tablet, Take 10 mg by mouth Every Night., Disp: , Rfl:   •  Zinc Sulfate (ZINC 15 PO), , Disp: , Rfl:   Allergies   Allergen Reactions   • Bee Venom Nausea And Vomiting   • Latex Hives     01/18/2005-Latex unspecified     • Levofloxacin Delirium   • Poison Ivy Extract Hives   • Red Dye Itching     06/22/2006-Skin itch     • Tetracycline Other (See Comments)     01/18/2005-Tetracycline intolerant     • Adhesive Tape Rash   • Tegaderm Ag Mesh [Silver] Rash   • Wasp Venom Rash       BMI is >= 30 and <35. (Class 1 Obesity). The following options were offered after discussion;: nutrition counseling/recommendations      Past Medical History:   Diagnosis Date   • Anxiety    • Arthritis    • Dizzy spells    • Elevated cholesterol    • Hematuria    • Hypertension    • Osteoarthritis    • Pancreatitis     2007   • PMB  "(postmenopausal bleeding)      Past Surgical History:   Procedure Laterality Date   • ADENOIDECTOMY     • BILATERAL INSERTION OF EAR TUBES AND ADENOIDECTOMY     • COLONOSCOPY N/A 2/18/2022    Procedure: COLONOSCOPY WITH ANESTHESIA;  Surgeon: Bharat Vallejo MD;  Location[facility] ENDOSCOPY;  Service: Gastroenterology;  Laterality: N/A;  pre screen  post; polyps   Daren Zazueta MD   • LAPAROSCOPIC CHOLECYSTECTOMY     • TONSILLECTOMY         Review of Systems   Constitutional: Negative.    HENT: Negative.    Eyes: Negative.    Respiratory: Negative.    Cardiovascular: Positive for leg swelling.   Gastrointestinal: Negative.    Endocrine: Negative.    Genitourinary: Negative.    Musculoskeletal: Negative.    Skin: Negative.    Allergic/Immunologic: Negative.    Neurological: Negative.    Hematological: Negative.    Psychiatric/Behavioral: Negative.        Objective  /75   Pulse 78   Resp 14   Ht 167.6 cm (66\")   Wt 91.2 kg (201 lb)   SpO2 97%   BMI 32.44 kg/m²   Physical Exam  Vitals and nursing note reviewed.   Constitutional:       Appearance: Normal appearance. She is normal weight.   HENT:      Head: Normocephalic and atraumatic.      Nose: Nose normal.      Mouth/Throat:      Mouth: Mucous membranes are moist.   Eyes:      Extraocular Movements: Extraocular movements intact.      Conjunctiva/sclera: Conjunctivae normal.      Pupils: Pupils are equal, round, and reactive to light.   Cardiovascular:      Rate and Rhythm: Normal rate and regular rhythm.      Pulses: Normal pulses.      Heart sounds: Normal heart sounds.   Pulmonary:      Effort: Pulmonary effort is normal.      Breath sounds: Normal breath sounds.   Abdominal:      General: Abdomen is flat. Bowel sounds are normal.      Palpations: Abdomen is soft.   Musculoskeletal:         General: Swelling and tenderness present.      Cervical back: Normal range of motion and neck supple.      Right lower leg: Edema present.      Left lower " leg: Edema present.   Skin:     General: Skin is warm and dry.      Capillary Refill: Capillary refill takes less than 2 seconds.   Neurological:      General: No focal deficit present.      Mental Status: She is alert and oriented to person, place, and time. Mental status is at baseline.   Psychiatric:         Mood and Affect: Mood normal.         Behavior: Behavior normal.         Thought Content: Thought content normal.         Judgment: Judgment normal.         Assessment & Plan   Diagnoses and all orders for this visit:    1. Varicose veins of both lower extremities with pain (Primary)  -     CBC & Differential  -     Comprehensive metabolic panel  -     TSH  -     T4, Free  -     Iron  -     Ferritin  -     Vitamin B12  -     Ambulatory Referral to Vascular Surgery    2. Cold intolerance  -     CBC & Differential  -     Comprehensive metabolic panel  -     TSH  -     T4, Free  -     Iron  -     Ferritin  -     Vitamin B12                 Orders Placed This Encounter   Procedures   • Comprehensive metabolic panel     Order Specific Question:   Release to patient     Answer:   Immediate   • TSH     Order Specific Question:   Release to patient     Answer:   Immediate   • T4, Free     Order Specific Question:   Release to patient     Answer:   Immediate   • Iron   • Ferritin   • Vitamin B12     Order Specific Question:   Release to patient     Answer:   Immediate   • Ambulatory Referral to Vascular Surgery     Referral Priority:   Routine     Referral Type:   Consultation     Referral Reason:   Specialty Services Required     Referred to Provider:   Lawanda Dill APRN     Requested Specialty:   Vascular Surgery     Number of Visits Requested:   1   • CBC & Differential       Follow up: 6 week(s)

## 2022-06-30 ENCOUNTER — TELEPHONE (OUTPATIENT)
Dept: FAMILY MEDICINE CLINIC | Facility: CLINIC | Age: 62
End: 2022-06-30

## 2022-06-30 LAB
ALBUMIN SERPL-MCNC: 4.4 G/DL (ref 3.5–5.2)
ALBUMIN/GLOB SERPL: 1.8 G/DL
ALP SERPL-CCNC: 89 U/L (ref 39–117)
ALT SERPL-CCNC: 12 U/L (ref 1–33)
AST SERPL-CCNC: 16 U/L (ref 1–32)
BASOPHILS # BLD AUTO: 0.07 10*3/MM3 (ref 0–0.2)
BASOPHILS NFR BLD AUTO: 0.9 % (ref 0–1.5)
BILIRUB SERPL-MCNC: 0.7 MG/DL (ref 0–1.2)
BUN SERPL-MCNC: 14 MG/DL (ref 6–20)
BUN/CREAT SERPL: 17.1 (ref 7–25)
CALCIUM SERPL-MCNC: 9.9 MG/DL (ref 8.6–10.5)
CHLORIDE SERPL-SCNC: 103 MMOL/L (ref 98–107)
CO2 SERPL-SCNC: 27.2 MMOL/L (ref 22–29)
CREAT SERPL-MCNC: 0.82 MG/DL (ref 0.57–1)
EGFRCR SERPLBLD CKD-EPI 2021: 82.5 ML/MIN/1.73
EOSINOPHIL # BLD AUTO: 0.25 10*3/MM3 (ref 0–0.4)
EOSINOPHIL NFR BLD AUTO: 3.4 % (ref 0.3–6.2)
ERYTHROCYTE [DISTWIDTH] IN BLOOD BY AUTOMATED COUNT: 12.5 % (ref 12.3–15.4)
FERRITIN SERPL-MCNC: 162 NG/ML (ref 13–150)
GLOBULIN SER CALC-MCNC: 2.4 GM/DL
GLUCOSE SERPL-MCNC: 105 MG/DL (ref 65–99)
HCT VFR BLD AUTO: 43 % (ref 34–46.6)
HGB BLD-MCNC: 14 G/DL (ref 12–15.9)
IMM GRANULOCYTES # BLD AUTO: 0.02 10*3/MM3 (ref 0–0.05)
IMM GRANULOCYTES NFR BLD AUTO: 0.3 % (ref 0–0.5)
IRON SERPL-MCNC: 95 MCG/DL (ref 37–145)
LYMPHOCYTES # BLD AUTO: 2.02 10*3/MM3 (ref 0.7–3.1)
LYMPHOCYTES NFR BLD AUTO: 27.1 % (ref 19.6–45.3)
MCH RBC QN AUTO: 29.3 PG (ref 26.6–33)
MCHC RBC AUTO-ENTMCNC: 32.6 G/DL (ref 31.5–35.7)
MCV RBC AUTO: 90 FL (ref 79–97)
MONOCYTES # BLD AUTO: 0.57 10*3/MM3 (ref 0.1–0.9)
MONOCYTES NFR BLD AUTO: 7.7 % (ref 5–12)
NEUTROPHILS # BLD AUTO: 4.52 10*3/MM3 (ref 1.7–7)
NEUTROPHILS NFR BLD AUTO: 60.6 % (ref 42.7–76)
NRBC BLD AUTO-RTO: 0 /100 WBC (ref 0–0.2)
PLATELET # BLD AUTO: 329 10*3/MM3 (ref 140–450)
POTASSIUM SERPL-SCNC: 4.5 MMOL/L (ref 3.5–5.2)
PROT SERPL-MCNC: 6.8 G/DL (ref 6–8.5)
RBC # BLD AUTO: 4.78 10*6/MM3 (ref 3.77–5.28)
SODIUM SERPL-SCNC: 141 MMOL/L (ref 136–145)
T4 FREE SERPL-MCNC: 1.12 NG/DL (ref 0.93–1.7)
TSH SERPL DL<=0.005 MIU/L-ACNC: 4.13 UIU/ML (ref 0.27–4.2)
VIT B12 SERPL-MCNC: 730 PG/ML (ref 211–946)
WBC # BLD AUTO: 7.45 10*3/MM3 (ref 3.4–10.8)

## 2022-06-30 NOTE — TELEPHONE ENCOUNTER
Caller: BRADY BROWN     Relationship: SELF     Best call back number: [phone] (H)    What orders are you requesting (i.e. lab or imaging): LABS IF NEEDED   In what timeframe would the patient need to come in: STATES SHE WOULD COME IN TODAY IF THEY ARE NEEDED     Where will you receive your lab/imaging services: LAB     Additional notes: STATES SHE SEEN THAT HER FERRITIN LAB RESULT IS HIGH AND WANTED TO KNOW IF DR HERNANDEZ WOULD LIKE TO HAVE MORE LABS DRAWN

## 2022-06-30 NOTE — TELEPHONE ENCOUNTER
The level is just barely elevated so will repeat it in 4mos to make sure not rising(it will take 3mos for a change to be noted)

## 2022-07-05 ENCOUNTER — TELEPHONE (OUTPATIENT)
Dept: VASCULAR SURGERY | Facility: CLINIC | Age: 62
End: 2022-07-05

## 2022-07-06 ENCOUNTER — OFFICE VISIT (OUTPATIENT)
Dept: VASCULAR SURGERY | Facility: CLINIC | Age: 62
End: 2022-07-06

## 2022-07-06 VITALS
DIASTOLIC BLOOD PRESSURE: 68 MMHG | HEART RATE: 68 BPM | WEIGHT: 204 LBS | SYSTOLIC BLOOD PRESSURE: 132 MMHG | HEIGHT: 65 IN | BODY MASS INDEX: 33.99 KG/M2 | OXYGEN SATURATION: 98 %

## 2022-07-06 DIAGNOSIS — I10 ESSENTIAL HYPERTENSION: ICD-10-CM

## 2022-07-06 DIAGNOSIS — I87.323 CHRONIC VENOUS HYPERTENSION WITH INFLAMMATION INVOLVING BOTH SIDES: Primary | ICD-10-CM

## 2022-07-06 DIAGNOSIS — I89.0 LYMPHEDEMA: ICD-10-CM

## 2022-07-06 PROCEDURE — 99214 OFFICE O/P EST MOD 30 MIN: CPT | Performed by: NURSE PRACTITIONER

## 2022-07-06 NOTE — PROGRESS NOTES
07/06/2022      Daren Zazueta MD  [address] [place] [id number]    Radha Wolff  1960    Chief Complaint   Patient presents with   • Varicose Veins     Pt here for  VARICOSE VEINS OF BOTH LOWER EXT WITH PAIN  Pt states that she does have pn behind her L knee.   Pt also states that downward and upward compression rub increase pn to level 10  PT also complains of being cold all the time   Pt denies any Stroke like SX  Pt has no Hx of smoking        Dear Daren Zazueta MD:      HPI  I had the pleasure of seeing your patient Radha Wolff in the office today.  Thank you kindly for this consultation.  As you recall, Radha Wolff is a 61 y.o.  female who you are currently following for routine health maintenance.  She does have varicose veins to bilateral lower extremities.  She reports pain behind her left knee.  She denies any previous history of DVT.  She does wear compression stockings on a daily basis.    Past Medical History:   Diagnosis Date   • Anxiety    • Arthritis    • Dizzy spells    • Elevated cholesterol    • Hematuria    • Hypertension    • Osteoarthritis    • Pancreatitis     2007   • PMB (postmenopausal bleeding)        Past Surgical History:   Procedure Laterality Date   • ADENOIDECTOMY     • BILATERAL INSERTION OF EAR TUBES AND ADENOIDECTOMY     • COLONOSCOPY N/A 2/18/2022    Procedure: COLONOSCOPY WITH ANESTHESIA;  Surgeon: Bharat Vallejo MD;  Location: [facility] ENDOSCOPY;  Service: Gastroenterology;  Laterality: N/A;  pre screen  post; polyps   Daren Zazueta MD   • LAPAROSCOPIC CHOLECYSTECTOMY     • TONSILLECTOMY         Family History   Problem Relation Age of Onset   • Lung cancer Mother    • Liver cancer Mother    • Lung cancer Father    • Obesity Brother    • Heart disease Brother    • Arthritis Brother    • No Known Problems Maternal Grandmother    • No Known Problems Paternal Grandmother    • No Known Problems Maternal Aunt    • No Known  Problems Paternal Aunt    • No Known Problems Sister    • No Known Problems Daughter    • No Known Problems Son    • No Known Problems Other    • BRCA 1/2 Neg Hx    • Breast cancer Neg Hx    • Colon cancer Neg Hx    • Endometrial cancer Neg Hx    • Ovarian cancer Neg Hx    • Colon polyps Neg Hx    • Esophageal cancer Neg Hx        Social History     Socioeconomic History   • Marital status: Single   Tobacco Use   • Smoking status: Never Smoker   • Smokeless tobacco: Never Used   Substance and Sexual Activity   • Alcohol use: Not Currently   • Drug use: Never   • Sexual activity: Yes     Partners: Male       Allergies   Allergen Reactions   • Bee Venom Nausea And Vomiting   • Latex Hives     01/18/2005-Latex unspecified     • Levofloxacin Delirium   • Poison Ivy Extract Hives   • Red Dye Itching     06/22/2006-Skin itch     • Tetracycline Other (See Comments)     01/18/2005-Tetracycline intolerant     • Adhesive Tape Rash   • Tegaderm Ag Mesh [Silver] Rash   • Wasp Venom Rash       Current Outpatient Medications   Medication Instructions   • ascorbic acid (VITAMIN C) 100 MG tablet No dose, route, or frequency recorded.   • cetirizine (ZYRTEC) 10 mg, Oral, Daily   • Cholecalciferol (VITAMIN D3 PO) Oral, Daily   • diphenhydrAMINE (BENADRYL) 25 mg, Oral, As Needed   • hydroCHLOROthiazide (MICROZIDE) 12.5 mg, Oral, Daily   • KRILL OIL PO Oral, Daily   • LORazepam (ATIVAN) 1 mg, Oral, Every 8 Hours PRN   • MAGNESIUM PO Oral, 2 times daily   • meclizine (ANTIVERT) 25 mg, Oral   • melatonin 10 mg, Oral, Nightly   • Zinc Sulfate (ZINC 15 PO) No dose, route, or frequency recorded.       Review of Systems   Constitutional: Negative.    HENT: Negative.    Eyes: Negative.    Respiratory: Negative.    Cardiovascular: Negative.    Gastrointestinal: Negative.    Endocrine: Negative.    Genitourinary: Negative.    Musculoskeletal: Negative.    Skin: Negative.    Allergic/Immunologic: Negative.    Neurological: Negative.   "  Hematological: Negative.    Psychiatric/Behavioral: Negative.        /68   Pulse 68   Ht 165.1 cm (65\")   Wt 92.5 kg (204 lb)   SpO2 98%   BMI 33.95 kg/m²   Physical Exam  Vitals and nursing note reviewed.   Constitutional:       General: She is not in acute distress.     Appearance: Normal appearance. She is well-developed. She is obese. She is not diaphoretic.   HENT:      Head: Normocephalic and atraumatic.   Eyes:      General: No scleral icterus.     Pupils: Pupils are equal, round, and reactive to light.   Neck:      Thyroid: No thyromegaly.      Vascular: No carotid bruit or JVD.   Cardiovascular:      Rate and Rhythm: Normal rate and regular rhythm.      Pulses: Normal pulses.      Heart sounds: Normal heart sounds and S2 normal. No murmur heard.    No friction rub. No gallop.      Comments: Varicose veins to bilateral lower extremities, lymphedema appearance  Pulmonary:      Effort: Pulmonary effort is normal.      Breath sounds: Normal breath sounds.   Abdominal:      General: Bowel sounds are normal.      Palpations: Abdomen is soft.   Musculoskeletal:         General: Normal range of motion.      Cervical back: Normal range of motion and neck supple.      Right lower leg: Edema present.      Left lower leg: Edema present.   Skin:     General: Skin is warm and dry.   Neurological:      General: No focal deficit present.      Mental Status: She is alert and oriented to person, place, and time.      Cranial Nerves: No cranial nerve deficit.   Psychiatric:         Mood and Affect: Mood normal.         Behavior: Behavior normal.         Thought Content: Thought content normal.         Judgment: Judgment normal.         No results found.    Patient Active Problem List   Diagnosis   • Anxiety   • Essential hypertension   • Epigastric pain   • Chronic constipation   • Encounter for screening for malignant neoplasm of colon         ICD-10-CM ICD-9-CM   1. Chronic venous hypertension with inflammation " involving both sides  I87.323 459.32   2. Lymphedema  I89.0 457.1   3. Essential hypertension  I10 401.9           Plan: After thoroughly evaluating Radha Wolff, I believe the best course of action is to initially remain conservative from a vascular standpoint.  I would like her to continue wearing compression stockings in the 20-30 mm pressure gradient range.  I did instruct the patient on how to wear these on a daily basis.  I would like her to keep her legs elevated when she is not on them, and keep her legs well moisturized.  We will see the patient back in 2 weeks with a venous valvular insufficiency study. If the testing does show significant venous reflux, the patient may be a great candidate for endovenous closure as the patient's symptoms have significantly impacted their activities of daily living.  I do think she would be a great candidate for home lymphedema pumps and we will have her see our tactile  with her next visit.  The patient can continue taking their current medication regimen as previously planned.  This was all discussed in full with complete understanding.    Thank you for allowing me to participate in the care of your patient.  Please do not hesitate with any questions or concerns.  I will keep you aware of any further encounters with Radha Wolff.        Sincerely yours,         EILEEN Toussaint

## 2022-07-11 ENCOUNTER — HOSPITAL ENCOUNTER (OUTPATIENT)
Dept: ULTRASOUND IMAGING | Facility: HOSPITAL | Age: 62
Discharge: HOME | End: 2022-07-11
Admitting: NURSE PRACTITIONER

## 2022-07-11 DIAGNOSIS — I87.323 CHRONIC VENOUS HYPERTENSION WITH INFLAMMATION INVOLVING BOTH SIDES: ICD-10-CM

## 2022-07-11 PROCEDURE — 93970 EXTREMITY STUDY: CPT

## 2022-07-11 PROCEDURE — 93970 EXTREMITY STUDY: CPT | Performed by: SURGERY

## 2022-07-18 ENCOUNTER — TELEPHONE (OUTPATIENT)
Dept: VASCULAR SURGERY | Facility: CLINIC | Age: 62
End: 2022-07-18

## 2022-07-19 ENCOUNTER — OFFICE VISIT (OUTPATIENT)
Dept: VASCULAR SURGERY | Facility: CLINIC | Age: 62
End: 2022-07-19

## 2022-07-19 VITALS
OXYGEN SATURATION: 99 % | DIASTOLIC BLOOD PRESSURE: 78 MMHG | SYSTOLIC BLOOD PRESSURE: 132 MMHG | HEART RATE: 73 BPM | BODY MASS INDEX: 33.95 KG/M2 | HEIGHT: 65 IN

## 2022-07-19 DIAGNOSIS — I87.333 CHRONIC VENOUS HYPERTENSION WITH ULCER AND INFLAMMATION INVOLVING BOTH SIDES: ICD-10-CM

## 2022-07-19 DIAGNOSIS — I89.0 LYMPHEDEMA: Primary | ICD-10-CM

## 2022-07-19 DIAGNOSIS — I10 ESSENTIAL HYPERTENSION: ICD-10-CM

## 2022-07-19 PROCEDURE — 99213 OFFICE O/P EST LOW 20 MIN: CPT | Performed by: SURGERY

## 2022-07-19 NOTE — PROGRESS NOTES
"7/19/2022       Daren Zazueta MD  [address] [place] [id number]    Radha Wolff  1960    Chief Complaint   Patient presents with   • Follow-up     2 wk f/u with bilateral venous duplex.  Pt states she is unable to wear the compression stockings.  She states that they are so tight that she gets nauseous.  Pt is wearing some compression socks and states that she is wearing compression socks with less compression.  She will see Tactile after this visit.        Dear Daren Zazeuta MD       HPI  I had the pleasure of seeing your patient Radha Wolff in the office today.  As you recall, Radha Wolff is a 61 y.o.  female who you are currently following for routine health maintenance.  She does have varicose veins to bilateral lower extremities.  She reports pain behind her left knee that feels like a bruise.  She denies any previous history of DVT.  She did try to wear compression stockings but could not tolerate higher compression. She did have noninvasive testing performed, which I did personally review.     Review of Systems   Constitutional: Negative.    HENT: Negative.    Eyes: Negative.    Respiratory: Negative.    Cardiovascular: Negative.    Gastrointestinal: Negative.    Endocrine: Negative.    Genitourinary: Negative.    Musculoskeletal: Negative.    Skin: Negative.    Allergic/Immunologic: Negative.    Neurological: Negative.    Hematological: Negative.    Psychiatric/Behavioral: Negative.        /78   Pulse 73   Ht 165.1 cm (65\")   SpO2 99%   BMI 33.95 kg/m²   Physical Exam  Vitals and nursing note reviewed.   Constitutional:       Appearance: She is well-developed.   HENT:      Head: Normocephalic and atraumatic.   Eyes:      General: No scleral icterus.     Pupils: Pupils are equal, round, and reactive to light.   Neck:      Thyroid: No thyromegaly.      Vascular: No carotid bruit or JVD.   Cardiovascular:      Rate and Rhythm: Normal rate and regular " rhythm.      Heart sounds: Normal heart sounds.      Comments: Varicose veins to bilateral lower extremities  Pulmonary:      Effort: Pulmonary effort is normal.      Breath sounds: Normal breath sounds.   Abdominal:      General: Bowel sounds are normal. There is no distension or abdominal bruit.      Palpations: Abdomen is soft. There is no mass.      Tenderness: There is no abdominal tenderness.   Musculoskeletal:         General: Normal range of motion.      Cervical back: Neck supple.      Right lower leg: Edema present.      Left lower leg: Edema present.      Comments: Swelling consistent with lymphedema bilaterally, left greater than right   Lymphadenopathy:      Cervical: No cervical adenopathy.   Skin:     General: Skin is warm and dry.   Neurological:      General: No focal deficit present.      Mental Status: She is alert and oriented to person, place, and time.      Cranial Nerves: No cranial nerve deficit.      Sensory: No sensory deficit.            US Venous Doppler Lower Extremity Bilateral (duplex)    Result Date: 7/11/2022  Narrative: History: Swelling   Comments: Venous valvular insufficiency testing was performed in the bilateral lower extremities using duplex ultrasound with compression techniques.  The common femoral vein, superficial femoral, popliteal vein, posterior tibial vein, peroneal vein, greater saphenous vein, and lesser saphenous veins were interrogated.  On the right, the greater saphenous vein at the junction measured 7mm. In the mid thigh measured 4.2mm. Above the knee measured 3.7mm. In the mid calf measured 2.8mm. At the ankle measured 2.7mm. There is no evidence of reflux in the greater saphenous vein. The lesser saphenous vein measured 6.3 mm at the knee. 5.3 mm in the mid calf. 4.3 mm at the ankle. There is greater than 0.5 seconds of reflux from the knee to the ankle. There is no evidence of DVT.  On the left, the greater saphenous vein at the junction measured 5mm. In the  mid thigh measured 4.4mm. Above the knee measured 4.4mm. In the mid calf measured 2.9mm. At the ankle measured 2.9mm. There is greater than 0.5 seconds of reflux at the ankle only. The lesser saphenous vein measured 4.7 mm at the knee. In the mid calf measured 3.2 mm. At the ankle measured 2.9 mm. There is greater than 0.5 seconds of reflux from the calf to the ankle. There is no evidence of DVT.      Impression: Impression: There is evidence of venous insufficiency in the left lower extremity greater saphenous vein and bilateral lower extremity lesser saphenous veins.    This report was finalized on 07/11/2022 16:51 by Dr. Fredis Mcfadden MD.      Patient Active Problem List   Diagnosis   • Anxiety   • Essential hypertension   • Epigastric pain   • Chronic constipation   • Encounter for screening for malignant neoplasm of colon         ICD-10-CM ICD-9-CM   1. Lymphedema  I89.0 457.1   2. Chronic venous hypertension with ulcer and inflammation involving both sides ([facility])  I87.333 459.33    L97.919     L97.929    3. Essential hypertension  I10 401.9           Plan: After thoroughly evaluating Radha Wolff, I believe the best course of action is to remain conservative from vascular surgery standpoint.  I did review her testing which does show mild venous insufficiency to bilateral lesser saphenous veins.  At this time I would not recommend any intervention.  I would recommend she continue to wear compression stockings on a daily basis.  She does have a consistent lymphedema appearance.  I did offer home lymphedema pumps however she is not interested in pursuing.  At this point she can follow in my office as needed. The patient can continue taking their current medication regimen as previously planned.  This was all discussed in full with complete understanding.    Thank you for allowing me to participate in the care of your patient.  Please do not hesitate with any questions or concerns.  I will keep you aware  of any further encounters with Radha Wolff.        Sincerely yours,         Fredis Mcfadden, DO

## 2022-07-21 DIAGNOSIS — F41.9 ANXIETY: ICD-10-CM

## 2022-07-21 RX ORDER — LORAZEPAM 1 MG/1
1 TABLET ORAL EVERY 8 HOURS PRN
Qty: 90 TABLET | Refills: 0 | Status: SHIPPED | OUTPATIENT
Start: 2022-07-21 | End: 2022-08-22

## 2022-08-01 ENCOUNTER — OFFICE VISIT (OUTPATIENT)
Dept: FAMILY MEDICINE CLINIC | Facility: CLINIC | Age: 62
End: 2022-08-01

## 2022-08-01 VITALS
SYSTOLIC BLOOD PRESSURE: 122 MMHG | HEIGHT: 65 IN | DIASTOLIC BLOOD PRESSURE: 72 MMHG | OXYGEN SATURATION: 97 % | BODY MASS INDEX: 34.49 KG/M2 | WEIGHT: 207 LBS | HEART RATE: 73 BPM

## 2022-08-01 DIAGNOSIS — R68.89 COLD INTOLERANCE: ICD-10-CM

## 2022-08-01 DIAGNOSIS — I89.0 LYMPHEDEMA: ICD-10-CM

## 2022-08-01 DIAGNOSIS — R79.89 ELEVATED FERRITIN: ICD-10-CM

## 2022-08-01 DIAGNOSIS — I87.2 VENOUS INSUFFICIENCY: Primary | ICD-10-CM

## 2022-08-01 PROBLEM — M62.82 RHABDOMYOLYSIS: Status: ACTIVE | Noted: 2022-08-01

## 2022-08-01 PROCEDURE — 99213 OFFICE O/P EST LOW 20 MIN: CPT | Performed by: FAMILY MEDICINE

## 2022-08-01 NOTE — PROGRESS NOTES
Subjective   Radha Wloff is a 61 y.o. female.     Chief Complaint   Patient presents with   • Varicose Veins   • Edema        History of Present Illness     she has been to vascular and dx with venous insuffliciency and lymphedema      Current Outpatient Medications:   •  ascorbic acid (VITAMIN C) 100 MG tablet, , Disp: , Rfl:   •  cetirizine (zyrTEC) 10 MG tablet, Take 10 mg by mouth Daily., Disp: , Rfl:   •  Cholecalciferol (VITAMIN D3 PO), Take  by mouth Daily., Disp: , Rfl:   •  diphenhydrAMINE (BENADRYL) 25 mg capsule, Take 25 mg by mouth As Needed., Disp: , Rfl:   •  hydroCHLOROthiazide (MICROZIDE) 12.5 MG capsule, Take 1 capsule by mouth Daily., Disp: 90 capsule, Rfl: 1  •  KRILL OIL PO, Take  by mouth Daily., Disp: , Rfl:   •  LORazepam (ATIVAN) 1 MG tablet, TAKE 1 TABLET BY MOUTH EVERY 8 (EIGHT) HOURS AS NEEDED FOR ANXIETY., Disp: 90 tablet, Rfl: 0  •  MAGNESIUM PO, Take  by mouth 2 (two) times a day., Disp: , Rfl:   •  meclizine (ANTIVERT) 25 MG tablet, Take 25 mg by mouth., Disp: , Rfl:   •  melatonin 5 MG tablet tablet, Take 10 mg by mouth Every Night., Disp: , Rfl:   •  Zinc Sulfate (ZINC 15 PO), , Disp: , Rfl:   Allergies   Allergen Reactions   • Bee Venom Nausea And Vomiting   • Latex Hives     01/18/2005-Latex unspecified     • Levofloxacin Delirium   • Poison Ivy Extract Hives   • Red Dye Itching     06/22/2006-Skin itch     • Tetracycline Other (See Comments)     01/18/2005-Tetracycline intolerant     • Adhesive Tape Rash   • Tegaderm Ag Mesh [Silver] Rash   • Wasp Venom Rash       BMI is >= 30 and <35. (Class 1 Obesity). The following options were offered after discussion;: nutrition counseling/recommendations      Past Medical History:   Diagnosis Date   • Anxiety    • Arthritis    • Dizzy spells    • Elevated cholesterol    • Hematuria    • Hypertension    • Osteoarthritis    • Pancreatitis     2007   • PMB (postmenopausal bleeding)    • Squamous cell carcinoma in situ (SCCIS) of skin of  "left upper arm      Past Surgical History:   Procedure Laterality Date   • ADENOIDECTOMY     • BILATERAL INSERTION OF EAR TUBES AND ADENOIDECTOMY     • COLONOSCOPY N/A 02/18/2022    Procedure: COLONOSCOPY WITH ANESTHESIA;  Surgeon: Bharat Vallejo MD;  Location: [facility] ENDOSCOPY;  Service: Gastroenterology;  Laterality: N/A;  pre screen  post; polyps   Daren Zazueta MD   • LAPAROSCOPIC CHOLECYSTECTOMY     • SKIN LESION EXCISION Left     SCC of the Left upper arm   • TONSILLECTOMY         Review of Systems   Constitutional: Negative.    HENT: Negative.    Eyes: Negative.    Respiratory: Negative.    Cardiovascular: Positive for leg swelling.   Gastrointestinal: Negative.    Endocrine: Negative.    Genitourinary: Negative.    Musculoskeletal: Negative.    Skin: Negative.    Allergic/Immunologic: Negative.    Neurological: Negative.    Hematological: Negative.    Psychiatric/Behavioral: Negative.        Objective  /72   Pulse 73   Ht 165.1 cm (65\")   Wt 93.9 kg (207 lb)   SpO2 97%   BMI 34.45 kg/m²   Physical Exam  Vitals and nursing note reviewed.   Constitutional:       Appearance: Normal appearance. She is normal weight.   HENT:      Head: Normocephalic and atraumatic.      Nose: Nose normal.      Mouth/Throat:      Mouth: Mucous membranes are moist.   Eyes:      Extraocular Movements: Extraocular movements intact.      Conjunctiva/sclera: Conjunctivae normal.      Pupils: Pupils are equal, round, and reactive to light.   Cardiovascular:      Rate and Rhythm: Normal rate and regular rhythm.      Pulses: Normal pulses.      Heart sounds: Normal heart sounds.   Pulmonary:      Effort: Pulmonary effort is normal.      Breath sounds: Normal breath sounds.   Abdominal:      General: Abdomen is flat. Bowel sounds are normal.   Musculoskeletal:         General: Normal range of motion.      Cervical back: Normal range of motion and neck supple.   Skin:     General: Skin is warm and dry.      Capillary " Refill: Capillary refill takes less than 2 seconds.   Neurological:      General: No focal deficit present.      Mental Status: She is alert and oriented to person, place, and time. Mental status is at baseline.   Psychiatric:         Mood and Affect: Mood normal.         Assessment & Plan   Diagnoses and all orders for this visit:    1. Venous insufficiency (Primary)    2. Lymphedema    3. Elevated ferritin  -     Ambulatory Referral to Hematology / Oncology    4. Cold intolerance  -     Ambulatory Referral to Endocrinology                 Orders Placed This Encounter   Procedures   • Ambulatory Referral to Hematology / Oncology     Referral Priority:   Routine     Requested Specialty:   Hematology and Oncology     Number of Visits Requested:   1   • Ambulatory Referral to Endocrinology     Referral Priority:   Routine     Referral Type:   Consultation     Referral Reason:   Specialty Services Required     Referred to Provider:   Herbert Becerra MD     Requested Specialty:   Endocrinology     Number of Visits Requested:   1       Follow up: 4 month(s)

## 2022-08-15 ENCOUNTER — TRANSCRIBE ORDERS (OUTPATIENT)
Dept: ADMINISTRATIVE | Facility: HOSPITAL | Age: 62
End: 2022-08-15

## 2022-08-15 DIAGNOSIS — Z12.31 ENCOUNTER FOR SCREENING MAMMOGRAM FOR MALIGNANT NEOPLASM OF BREAST: Primary | ICD-10-CM

## 2022-08-22 DIAGNOSIS — F41.9 ANXIETY: ICD-10-CM

## 2022-08-22 RX ORDER — LORAZEPAM 1 MG/1
1 TABLET ORAL EVERY 8 HOURS PRN
Qty: 90 TABLET | Refills: 0 | Status: SHIPPED | OUTPATIENT
Start: 2022-08-22 | End: 2022-09-19

## 2022-08-25 RX ORDER — MECLIZINE HYDROCHLORIDE 25 MG/1
25 TABLET ORAL 3 TIMES DAILY PRN
Qty: 60 TABLET | Refills: 0 | Status: SHIPPED | OUTPATIENT
Start: 2022-08-25

## 2022-08-25 RX ORDER — PROMETHAZINE HYDROCHLORIDE 25 MG/1
25 TABLET ORAL EVERY 6 HOURS PRN
Qty: 6 TABLET | Refills: 0 | Status: SHIPPED | OUTPATIENT
Start: 2022-08-25 | End: 2022-12-19

## 2022-08-25 NOTE — TELEPHONE ENCOUNTER
Caller: Radha Wolff    Relationship: Self    Best call back number:  [phone]      Requested Prescriptions:   Requested Prescriptions     Pending Prescriptions Disp Refills   • meclizine (ANTIVERT) 25 MG tablet       Sig: Take 1 tablet by mouth.    PROMETHAZINE     Pharmacy where request should be sent: [facility] [address] - [phone]  - [phone] FX     Additional details provided by patient: pt is also asking for promethzaine - she takes this PRN and she is going out of town on vacation and is concerned about nausea and dizziness due to flying.     She also asked if dramamine is recommended for flying or what  recommends that she take prior to flying - she also asked if she takes dramamine if she can take meclizine with it if needed     Lastly, she would like to know if she is still suppose to wear compression socks when flying - flight is between 4-5 hrs and 7-8 hrs     Does the patient have less than a 3 day supply:  [x] Yes  [] No    Saundra Arreola Rep   08/25/22 16:17 CDT

## 2022-09-19 ENCOUNTER — TELEPHONE (OUTPATIENT)
Dept: FAMILY MEDICINE CLINIC | Facility: CLINIC | Age: 62
End: 2022-09-19

## 2022-09-19 DIAGNOSIS — F41.9 ANXIETY: ICD-10-CM

## 2022-09-19 RX ORDER — LORAZEPAM 1 MG/1
1 TABLET ORAL EVERY 8 HOURS PRN
Qty: 90 TABLET | Refills: 0 | Status: SHIPPED | OUTPATIENT
Start: 2022-09-19 | End: 2022-10-18

## 2022-09-19 RX ORDER — AZITHROMYCIN 250 MG/1
TABLET, FILM COATED ORAL
Qty: 6 TABLET | Refills: 0 | Status: SHIPPED | OUTPATIENT
Start: 2022-09-19 | End: 2022-10-10

## 2022-09-19 NOTE — TELEPHONE ENCOUNTER
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 My ear is hurting, she said it was red and had fluid in it. My throat is slightly sore. Could I get an antibiotic called in?  I Covid tested negative.     Thanks,   Radha Wolff  [birth year]    Script sent next door

## 2022-09-19 NOTE — TELEPHONE ENCOUNTER
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 My ear is hurting, she said it was red and had fluid in it. My throat is slightly sore. Could I get an antibiotic called in?  I Covid tested negative.     Thanks,   Radha Wolff  [birth year]

## 2022-09-27 ENCOUNTER — TELEPHONE (OUTPATIENT)
Dept: FAMILY MEDICINE CLINIC | Facility: CLINIC | Age: 62
End: 2022-09-27

## 2022-09-27 NOTE — TELEPHONE ENCOUNTER
----- Message from Liset Ramírez MA sent at 9/27/2022  4:44 PM CDT -----  Regarding: FW: Lymphodema    ----- Message -----  From: Radha Wolff  Sent: 9/27/2022   4:22 PM CDT  To: [facility]  Subject: Lymphodema                                       Will my hemotologist be able to run blood test?    Is there a certified lympho massage therapist in the area?    Lympho dietician?    I can't believe I'm the only one having problems that can't get answers to my questions.     I have no surgeries, etc to have caused this. It happened right after menopause. I'm confused, and I want to feel better but can't get help to feel better.     Jeannie    Unfortunately there is no blood testing for this condition or dietician specialists in this area---im unaware of message therapists who have expertise in this locale---would she like referral to pt/ot at [facility]?--this entity has been helpful to several patients in our practice

## 2022-09-27 NOTE — TELEPHONE ENCOUNTER
----- Message from Ana Hyman MA sent at 9/27/2022 12:48 PM CDT -----  Regarding: FW: Lymphodema    ----- Message -----  From: Radha Wolff  Sent: 9/27/2022  12:22 PM CDT  To: [facility]  Subject: Lymphodema                                       When I saw Liset and Dr Mcfadden I was told I have Lymphodema. Since then I have done some research.     First question, why did they not refer me to a Dr to help me with this problem?    Why did they not refer me to a certified lymphatic message therapist? A dietician for Lymphodema?  Any bloodwork done to make sure this is what I have?    I feel like I'm being thrown to the side or slipping through the cracks in the floor. If I have this, then I want to get treatment for it.     Thanks, Jeannie Petersen we do not have a physician that specializes but we do have a physical therapy/occupational therapy dept that tx this condition--they use pumps etc--would she like a referral?

## 2022-10-10 ENCOUNTER — LAB (OUTPATIENT)
Dept: LAB | Facility: HOSPITAL | Age: 62
End: 2022-10-10

## 2022-10-10 ENCOUNTER — CONSULT (OUTPATIENT)
Dept: ONCOLOGY | Facility: CLINIC | Age: 62
End: 2022-10-10

## 2022-10-10 VITALS
HEART RATE: 78 BPM | OXYGEN SATURATION: 97 % | HEIGHT: 65 IN | SYSTOLIC BLOOD PRESSURE: 144 MMHG | TEMPERATURE: 97.4 F | WEIGHT: 208.1 LBS | BODY MASS INDEX: 34.67 KG/M2 | DIASTOLIC BLOOD PRESSURE: 90 MMHG | RESPIRATION RATE: 16 BRPM

## 2022-10-10 DIAGNOSIS — I89.0 LYMPHEDEMA OF BOTH LOWER EXTREMITIES: ICD-10-CM

## 2022-10-10 DIAGNOSIS — R79.89 ELEVATED FERRITIN: ICD-10-CM

## 2022-10-10 DIAGNOSIS — R79.89 ELEVATED FERRITIN: Primary | ICD-10-CM

## 2022-10-10 LAB
ALBUMIN SERPL-MCNC: 4.5 G/DL (ref 3.5–5.2)
ALBUMIN/GLOB SERPL: 1.8 G/DL
ALP SERPL-CCNC: 81 U/L (ref 39–117)
ALT SERPL W P-5'-P-CCNC: 16 U/L (ref 1–33)
ANION GAP SERPL CALCULATED.3IONS-SCNC: 10 MMOL/L (ref 5–15)
AST SERPL-CCNC: 23 U/L (ref 1–32)
BASOPHILS # BLD AUTO: 0.07 10*3/MM3 (ref 0–0.2)
BASOPHILS NFR BLD AUTO: 0.8 % (ref 0–1.5)
BILIRUB SERPL-MCNC: 0.3 MG/DL (ref 0–1.2)
BUN SERPL-MCNC: 15 MG/DL (ref 6–20)
BUN/CREAT SERPL: 22.7 (ref 7–25)
CALCIUM SPEC-SCNC: 9.5 MG/DL (ref 8.6–10.5)
CHLORIDE SERPL-SCNC: 105 MMOL/L (ref 98–107)
CO2 SERPL-SCNC: 26 MMOL/L (ref 22–29)
CREAT SERPL-MCNC: 0.66 MG/DL (ref 0.57–1)
DEPRECATED RDW RBC AUTO: 45.1 FL (ref 37–54)
EGFRCR SERPLBLD CKD-EPI 2021: 101.2 ML/MIN/1.73
EOSINOPHIL # BLD AUTO: 0.33 10*3/MM3 (ref 0–0.4)
EOSINOPHIL NFR BLD AUTO: 3.9 % (ref 0.3–6.2)
ERYTHROCYTE [DISTWIDTH] IN BLOOD BY AUTOMATED COUNT: 13.3 % (ref 12.3–15.4)
FERRITIN SERPL-MCNC: 127.5 NG/ML (ref 13–150)
GLOBULIN UR ELPH-MCNC: 2.5 GM/DL
GLUCOSE SERPL-MCNC: 95 MG/DL (ref 65–99)
HCT VFR BLD AUTO: 44 % (ref 34–46.6)
HGB BLD-MCNC: 14.1 G/DL (ref 12–15.9)
IMM GRANULOCYTES # BLD AUTO: 0.02 10*3/MM3 (ref 0–0.05)
IMM GRANULOCYTES NFR BLD AUTO: 0.2 % (ref 0–0.5)
IRON 24H UR-MRATE: 68 MCG/DL (ref 37–145)
IRON SATN MFR SERPL: 17 % (ref 20–50)
LDH SERPL-CCNC: 177 U/L (ref 135–214)
LYMPHOCYTES # BLD AUTO: 3.01 10*3/MM3 (ref 0.7–3.1)
LYMPHOCYTES NFR BLD AUTO: 35.7 % (ref 19.6–45.3)
MCH RBC QN AUTO: 29.4 PG (ref 26.6–33)
MCHC RBC AUTO-ENTMCNC: 32 G/DL (ref 31.5–35.7)
MCV RBC AUTO: 91.9 FL (ref 79–97)
MONOCYTES # BLD AUTO: 0.64 10*3/MM3 (ref 0.1–0.9)
MONOCYTES NFR BLD AUTO: 7.6 % (ref 5–12)
NEUTROPHILS NFR BLD AUTO: 4.36 10*3/MM3 (ref 1.7–7)
NEUTROPHILS NFR BLD AUTO: 51.8 % (ref 42.7–76)
NRBC BLD AUTO-RTO: 0 /100 WBC (ref 0–0.2)
PLATELET # BLD AUTO: 380 10*3/MM3 (ref 140–450)
PMV BLD AUTO: 10.1 FL (ref 6–12)
POTASSIUM SERPL-SCNC: 3.8 MMOL/L (ref 3.5–5.2)
PROT SERPL-MCNC: 7 G/DL (ref 6–8.5)
RBC # BLD AUTO: 4.79 10*6/MM3 (ref 3.77–5.28)
SODIUM SERPL-SCNC: 141 MMOL/L (ref 136–145)
T4 FREE SERPL-MCNC: 1.05 NG/DL (ref 0.93–1.7)
TIBC SERPL-MCNC: 407 MCG/DL (ref 298–536)
TRANSFERRIN SERPL-MCNC: 273 MG/DL (ref 200–360)
TSH SERPL DL<=0.05 MIU/L-ACNC: 2.88 UIU/ML (ref 0.27–4.2)
WBC NRBC COR # BLD: 8.43 10*3/MM3 (ref 3.4–10.8)

## 2022-10-10 PROCEDURE — 36415 COLL VENOUS BLD VENIPUNCTURE: CPT

## 2022-10-10 PROCEDURE — 80053 COMPREHEN METABOLIC PANEL: CPT

## 2022-10-10 PROCEDURE — 84439 ASSAY OF FREE THYROXINE: CPT

## 2022-10-10 PROCEDURE — 82728 ASSAY OF FERRITIN: CPT

## 2022-10-10 PROCEDURE — 83540 ASSAY OF IRON: CPT

## 2022-10-10 PROCEDURE — 84466 ASSAY OF TRANSFERRIN: CPT

## 2022-10-10 PROCEDURE — 85025 COMPLETE CBC W/AUTO DIFF WBC: CPT

## 2022-10-10 PROCEDURE — 83615 LACTATE (LD) (LDH) ENZYME: CPT

## 2022-10-10 PROCEDURE — 82746 ASSAY OF FOLIC ACID SERUM: CPT

## 2022-10-10 PROCEDURE — 99214 OFFICE O/P EST MOD 30 MIN: CPT | Performed by: NURSE PRACTITIONER

## 2022-10-10 PROCEDURE — 84443 ASSAY THYROID STIM HORMONE: CPT

## 2022-10-10 PROCEDURE — 82607 VITAMIN B-12: CPT

## 2022-10-10 NOTE — PROGRESS NOTES
"[facility] HEMATOLOGY & ONCOLOGY [place]  [address]  [facility] [place] [address] [phone]    Patient Name: Radha Wolff  Encounter Date: 10/10/2022   YOB: 1960  Patient Number: [id number]    Initial Note    REASON FOR CONSULTATION: Patient states \" I am cold all the time, my blood coagulates too fast and I have high level of Ferritin in my body.\".    HISTORY OF PRESENT ILLNESS: Radha Wolff is a 61 y.o. female referred by Daren Zazueta MD for diagnostic and management recommendations for elevated ferritin/  History is obtained from patient. History is considered to be accurate.    She has health history significant for anxiety, hypertension, lymphedema, venous insufficiency (doppler lower extremity There is evidence of venous insufficiency in the left lower extremity greater saphenous vein and bilateral lower extremity lesser saphenous veins).    She complains of cold intolerance.  SE states she feels like her toes and fingers could fall off.  They do not get discolored.  Had + JUSTUS last year but Anti-DNA was negative.    Mother had cancer of liver and Dad had lung cancer.      Dexa scan November 2020  normal.  Oct 2021 Pap Smear unremarkable.   November 8, 2021 mammogram no suspicious features.  Jan 21, 2022 had CT Abd /Pelvis . Endometrial thickening advised transvaginal ultrasound  Feb 18, 2022 colonoscopy unremarkable.   April 11, 2022 No suspicious findings are seen in the right breast and here is no mammographic abnormality in the region of the patient's right breast pain.     In review of previous labs, pt's Ferritin was slightly elevated at 162.      LABS    Lab Results - Last 18 Months   Lab Units 10/10/22  1432 06/28/22  2300 01/20/22  1036 09/24/21  0807   HEMOGLOBIN g/dL 14.1 14.0 14.9 13.4   HEMATOCRIT % 44.0 43.0 44.8 40.2   MCV fL 91.9 90.0 88.2 90   WBC 10*3/mm3 8.43 7.45 8.69 6.3   RDW % 13.3 " 12.5 12.1* 12.4   MPV fL 10.1  --   --   --    PLATELETS 10*3/mm3 380 329 353 329   IMM GRAN % % 0.2  --   --   --    NEUTROS ABS 10*3/mm3 4.36 4.52 5.35 3.5   LYMPHS ABS 10*3/mm3 3.01 2.02 2.51 2.1   MONOS ABS 10*3/mm3 0.64 0.57 0.52 0.4   EOS ABS 10*3/mm3 0.33 0.25 0.24 0.2   BASOS ABS 10*3/mm3 0.07 0.07 0.06 0.1   IMMATURE GRANS (ABS) 10*3/mm3 0.02  --   --   --    NRBC /100 WBC 0.0 0.0 0.0  --        Lab Results - Last 18 Months   Lab Units 10/10/22  1432 06/28/22  2300 01/20/22  1036 09/24/21  0807   GLUCOSE mg/dL 95 105* 101* 92   SODIUM mmol/L 141 141 141 142   POTASSIUM mmol/L 3.8 4.5 4.7 4.6   TOTAL CO2 mmol/L  --  27.2 26.1 22   CO2 mmol/L 26.0  --   --   --    CHLORIDE mmol/L 105 103 104 107*   ANION GAP mmol/L 10.0  --   --   --    CREATININE mg/dL 0.66 0.82 0.70 0.77   BUN mg/dL 15 14 11 17   BUN / CREAT RATIO  22.7 17.1 15.7 22   CALCIUM mg/dL 9.5 9.9 10.1 9.5   EGFR IF NONAFRICN AM mL/min/1.73  --   --  86 85   ALK PHOS U/L 81 89 96 76   TOTAL PROTEIN g/dL 7.0  --   --   --    ALT (SGPT) U/L 16 12 16 11   AST (SGOT) U/L 23 16 20 20   BILIRUBIN mg/dL 0.3 0.7 0.5 0.5   ALBUMIN g/dL 4.50 4.40 4.60 4.3   GLOBULIN gm/dL 2.5  --   --   --        Lab Results - Last 18 Months   Lab Units 10/10/22  1432   LDH U/L 177       Lab Results - Last 18 Months   Lab Units 10/10/22  1432 06/28/22  2300 09/24/21  0807   IRON mcg/dL 68  --   --    TIBC mcg/dL 407  --   --    IRON SATURATION % 17*  --   --    FERRITIN ng/mL 127.50 162.00*  --    TSH uIU/mL 2.880 4.130 2.320   FOLATE ng/mL 13.80  --   --          PAST MEDICAL HISTORY:  ALLERGIES:  Allergies   Allergen Reactions   • Bee Venom Nausea And Vomiting   • Latex Hives     01/18/2005-Latex unspecified     • Levofloxacin Delirium   • Poison Ivy Extract Hives   • Red Dye Itching     06/22/2006-Skin itch     • Tetracycline Other (See Comments)     01/18/2005-Tetracycline intolerant     • Adhesive Tape Rash   • Tegaderm Ag Mesh [Silver] Rash   • Wasp Venom Rash      CURRENT MEDICATIONS:  Outpatient Encounter Medications as of 10/10/2022   Medication Sig Dispense Refill   • ascorbic acid (VITAMIN C) 100 MG tablet      • cetirizine (zyrTEC) 10 MG tablet Take 10 mg by mouth Daily.     • Cholecalciferol (VITAMIN D3 PO) Take  by mouth Daily.     • diphenhydrAMINE (BENADRYL) 25 mg capsule Take 25 mg by mouth As Needed.     • MAGNESIUM PO Take  by mouth 2 (two) times a day.     • meclizine (ANTIVERT) 25 MG tablet Take 1 tablet by mouth 3 (Three) Times a Day As Needed for Dizziness. 60 tablet 0   • melatonin 5 MG tablet tablet Take 10 mg by mouth Every Night.     • promethazine (PHENERGAN) 25 MG tablet Take 1 tablet by mouth Every 6 (Six) Hours As Needed for Nausea or Vomiting. 6 tablet 0   • Zinc Sulfate (ZINC 15 PO)      • [DISCONTINUED] hydroCHLOROthiazide (MICROZIDE) 12.5 MG capsule Take 1 capsule by mouth Daily. 90 capsule 1   • [DISCONTINUED] LORazepam (ATIVAN) 1 MG tablet TAKE 1 TABLET BY MOUTH EVERY 8 (EIGHT) HOURS AS NEEDED FOR ANXIETY. 90 tablet 0   • [DISCONTINUED] azithromycin (Zithromax Z-Phil) 250 MG tablet Take 2 tablets by mouth on day 1, then 1 tablet daily on days 2-5 6 tablet 0   • [DISCONTINUED] KRILL OIL PO Take  by mouth Daily.       No facility-administered encounter medications on file as of 10/10/2022.     ADULT ILLNESSES:  Patient Active Problem List   Diagnosis Code   • Anxiety F41.9   • Essential hypertension I10   • Epigastric pain R10.13   • Chronic constipation K59.09   • Encounter for screening for malignant neoplasm of colon Z12.11   • Rhabdomyolysis M62.82       HEALTH MAINTENANCE ITEMS:  Health Maintenance Due   Topic Date Due   • COVID-19 Vaccine (1) Never done   • ANNUAL PHYSICAL  Never done   • TDAP/TD VACCINES (1 - Tdap) Never done   • ZOSTER VACCINE (1 of 2) Never done   • HEPATITIS C SCREENING  Never done   • INFLUENZA VACCINE  Never done   • LIPID PANEL  09/24/2022       <no information>  Last Completed Colonoscopy          COLORECTAL  CANCER SCREENING (COLONOSCOPY - Every 5 Years) Next due on 2/18/2027 02/18/2022  COLONOSCOPY    02/18/2022  Surgical Procedure: COLONOSCOPY                There is no immunization history on file for this patient.  Last Completed Mammogram          Scheduled - MAMMOGRAM (Every 2 Years) Scheduled for 11/9/2022 04/11/2022  Mammo Diagnostic Digital Tomosynthesis Right With CAD    11/08/2021  Mammo Diagnostic Digital Tomosynthesis Bilateral With CAD    05/12/2021  Mammo Diagnostic Digital Tomosynthesis Right With CAD    11/09/2020  Mammo Diagnostic Digital Tomosynthesis Right With CAD    11/05/2020  Mammo Screening Digital Tomosynthesis Bilateral With CAD    Only the first 5 history entries have been loaded, but more history exists.                  FAMILY HISTORY:  Family History   Problem Relation Age of Onset   • Lung cancer Mother    • Liver cancer Mother    • Lung cancer Father    • Obesity Brother    • Heart disease Brother    • Arthritis Brother    • No Known Problems Maternal Grandmother    • No Known Problems Paternal Grandmother    • No Known Problems Maternal Aunt    • No Known Problems Paternal Aunt    • No Known Problems Sister    • No Known Problems Daughter    • No Known Problems Son    • No Known Problems Other    • BRCA 1/2 Neg Hx    • Breast cancer Neg Hx    • Colon cancer Neg Hx    • Endometrial cancer Neg Hx    • Ovarian cancer Neg Hx    • Colon polyps Neg Hx    • Esophageal cancer Neg Hx      SOCIAL HISTORY:  Social History     Socioeconomic History   • Marital status: Single   Tobacco Use   • Smoking status: Never   • Smokeless tobacco: Never   Substance and Sexual Activity   • Alcohol use: Not Currently   • Drug use: Never   • Sexual activity: Yes     Partners: Male       REVIEW OF SYSTEMS:  Review of Systems   Constitutional: Positive for fatigue. Negative for fever.   HENT: Negative for trouble swallowing.    Respiratory: Negative for cough and shortness of breath.    Cardiovascular:  "Negative for chest pain, palpitations and leg swelling.   Gastrointestinal: Negative for nausea and vomiting.   Endocrine: Positive for cold intolerance.   Genitourinary: Negative for hematuria.        Menopause at 59.  Has an appt    Musculoskeletal: Negative for arthralgias and myalgias.   Skin: Negative for rash, skin lesions and wound.   Neurological: Negative for dizziness, syncope, memory problem and confusion.   Psychiatric/Behavioral: Negative for suicidal ideas and depressed mood. The patient is not nervous/anxious.        /90   Pulse 78   Temp 97.4 °F (36.3 °C) (Temporal)   Resp 16   Ht 165.1 cm (65\")   Wt 94.4 kg (208 lb 1.6 oz)   SpO2 97%   BMI 34.63 kg/m²  Body surface area is 2.01 meters squared.    Pain Score    10/10/22 1306   PainSc:   3   PainLoc: Generalized       Physical Exam:  Physical Exam  Constitutional:       Appearance: Normal appearance.   HENT:      Head: Normocephalic and atraumatic.   Cardiovascular:      Rate and Rhythm: Normal rate and regular rhythm.   Pulmonary:      Effort: Pulmonary effort is normal.      Breath sounds: Normal breath sounds.   Abdominal:      General: Bowel sounds are normal.      Palpations: Abdomen is soft.   Musculoskeletal:      Right lower leg: Edema present.      Left lower leg: Edema present.   Skin:     General: Skin is warm and dry.   Neurological:      Mental Status: She is alert and oriented to person, place, and time.   Psychiatric:         Attention and Perception: Attention normal.         Mood and Affect: Mood is anxious.         Judgment: Judgment normal.         Radha Wolff reports a pain score of 3.  Given her pain assessment as noted, treatment options were discussed and the following options were decided upon as a follow-up plan to address the patient's pain: continuation of current treatment plan for pain.      ASSESSMENT / PLAN:    1. Elevated ferritin    2. Lymphedema of both lower extremities       1.  Elevated " Ferritin  -Explained to patient that there was only a sight elevation in ferritin  -Will recheck iron profile today     2.  Lymphedema   -Pt has BLE edema / lymphedema   -Will refer to PT for lymphatic massage     PLAN:   1.   regarding the reason for the referral.   2.   regarding elevated ferritin and differential diagnosis.  Hemochromatosis is not likes considering patient's ferritin was only 162.  Will recheck anemia profile today.   3.  Labs for CBC, CMP, Iron Profile, TSH, T4, LDH, B12   4.  Continue current medications, follow up, treatment plans per PCP and any other provider.   5.  Return to office in 1 week to review results.     6.  Care discussed with patient.  Understanding expressed.  Patient agreeable with plan.   7.  Further recommendations pending.      Thank you for the referral.    Juliana Strickland, EILEEN  10/10/2022

## 2022-10-11 LAB
FOLATE SERPL-MCNC: 13.8 NG/ML (ref 4.78–24.2)
VIT B12 BLD-MCNC: 489 PG/ML (ref 211–946)

## 2022-10-11 RX ORDER — HYDROCHLOROTHIAZIDE 12.5 MG/1
12.5 CAPSULE, GELATIN COATED ORAL DAILY
Qty: 90 CAPSULE | Refills: 1 | Status: SHIPPED | OUTPATIENT
Start: 2022-10-11

## 2022-10-14 ENCOUNTER — PATIENT ROUNDING (BHMG ONLY) (OUTPATIENT)
Dept: ONCOLOGY | Facility: CLINIC | Age: 62
End: 2022-10-14

## 2022-10-14 NOTE — PROGRESS NOTES
October 14, 2022    Hello, may I speak with Radha Wolff?    My name is SUSANNAH      I am  with [facility] HEMATOLOGY & ONCOLOGY [address]  [facility] [address]  [phone].    Before we get started may I verify your date of birth? 1960    I am calling to officially welcome you to our practice and ask about your recent visit. Is this a good time to talk? YES    Tell me about your visit with us. What things went well?  VISIT WENT VERY WELL       We're always looking for ways to make our patients' experiences even better. Do you have recommendations on ways we may improve?  NO    Overall were you satisfied with your first visit to our practice? YES       I appreciate you taking the time to speak with me today. Is there anything else I can do for you? NO      Thank you, and have a great day.

## 2022-10-17 ENCOUNTER — OFFICE VISIT (OUTPATIENT)
Dept: ONCOLOGY | Facility: CLINIC | Age: 62
End: 2022-10-17

## 2022-10-17 VITALS
DIASTOLIC BLOOD PRESSURE: 72 MMHG | SYSTOLIC BLOOD PRESSURE: 118 MMHG | HEART RATE: 77 BPM | RESPIRATION RATE: 16 BRPM | WEIGHT: 211 LBS | OXYGEN SATURATION: 95 % | BODY MASS INDEX: 35.16 KG/M2 | HEIGHT: 65 IN | TEMPERATURE: 97.2 F

## 2022-10-17 DIAGNOSIS — I89.0 LYMPHEDEMA OF BOTH LOWER EXTREMITIES: Primary | ICD-10-CM

## 2022-10-17 DIAGNOSIS — E61.1 IRON DEFICIENCY: ICD-10-CM

## 2022-10-17 PROCEDURE — 99214 OFFICE O/P EST MOD 30 MIN: CPT | Performed by: NURSE PRACTITIONER

## 2022-10-18 DIAGNOSIS — F41.9 ANXIETY: ICD-10-CM

## 2022-10-18 RX ORDER — LORAZEPAM 1 MG/1
1 TABLET ORAL EVERY 8 HOURS PRN
Qty: 90 TABLET | Refills: 0 | Status: SHIPPED | OUTPATIENT
Start: 2022-10-18 | End: 2022-11-18

## 2022-11-08 ENCOUNTER — OFFICE VISIT (OUTPATIENT)
Dept: OBSTETRICS AND GYNECOLOGY | Facility: CLINIC | Age: 62
End: 2022-11-08

## 2022-11-08 VITALS
BODY MASS INDEX: 33.49 KG/M2 | WEIGHT: 201 LBS | HEIGHT: 65 IN | SYSTOLIC BLOOD PRESSURE: 126 MMHG | DIASTOLIC BLOOD PRESSURE: 78 MMHG

## 2022-11-08 DIAGNOSIS — R92.1 BREAST CALCIFICATION SEEN ON MAMMOGRAM: Primary | ICD-10-CM

## 2022-11-08 DIAGNOSIS — Z78.9 NON-SMOKER: ICD-10-CM

## 2022-11-08 DIAGNOSIS — Z01.411 ENCOUNTER FOR GYNECOLOGICAL EXAMINATION WITH ABNORMAL FINDING: ICD-10-CM

## 2022-11-08 DIAGNOSIS — Z78.0 MENOPAUSE: ICD-10-CM

## 2022-11-08 DIAGNOSIS — R93.89 THICKENED ENDOMETRIUM: ICD-10-CM

## 2022-11-08 PROCEDURE — 99396 PREV VISIT EST AGE 40-64: CPT | Performed by: OBSTETRICS & GYNECOLOGY

## 2022-11-08 NOTE — PROGRESS NOTES
Subjective   Chief Complaint   Patient presents with   • Gynecologic Exam     Patient here for yearly exam. Last exam was done 10/26/2021 with pap and was normal.Patient goes tomorrow for repeat diagnostic mammogram, already has orders. Patient states she had a CT abd  done 2022 and states she was told that her endometrium was thickened. Patient would like to discuss having blood work today.      Radha Wolff is a 61 y.o. year old  menopausal female presenting to be seen for her annual exam.  Overall, the patient reports to still be struggling with inability to lose weight and is still cold all the time (same complaint last year).  Patient was recently evaluated at [facility]on[facility] for feeling of being cold, and had many labs drawn.  Patient currently dieting and is fasting for long periods of time.    Patient had a CT of the abdomen in January and was told by her PCP office that her endometrial lining was abnormally thick.  Patient had CT done for epigastric pain, which has since resolved.  She has had no pelvic pain and no vaginal bleeding.    SEXUAL Hx:  She is not sexually active.  In the past year there has not been new sexual partners.  She is still with same partner, but they have not had sex since he struggled with depression several years ago.  Patient says that is does not cause any tension and it is not a problem    HEALTH Hx:  She exercises regularly: yes, walking an average of 2-2.5 miles per day.  The patient reports regular self breast exams: no  She has noticed changes in height: no.    No Additional Complaints Reported    The following portions of the patient's history were reviewed and updated as appropriate:problem list, current medications, allergies, past family history, past medical history, past social history and past surgical history.    Social History    Tobacco Use      Smoking status: Never      Smokeless tobacco: Never    Review of Systems   Constitutional: Negative for activity  "change and unexpected weight change.   Respiratory: Negative for shortness of breath.    Cardiovascular: Positive for leg swelling. Negative for chest pain.   Gastrointestinal: Negative for abdominal pain, blood in stool, constipation (only occasionally) and diarrhea (intermittently, depending on diet).        Patient had colonoscopy in 2022 and will repeat in 5 years   Endocrine: Positive for cold intolerance. Negative for heat intolerance.   Genitourinary: Positive for enuresis (some urge incontinence if she waits too long) and vaginal discharge (no associated itching, burning or discomfort). Negative for difficulty urinating, pelvic pain and vaginal bleeding.   Musculoskeletal: Positive for arthralgias (osteoarthritis in foot) and back pain.   Neurological: Positive for headaches (frequently). Negative for dizziness.   Psychiatric/Behavioral: Positive for sleep disturbance. The patient is nervous/anxious (takes lorazepam every night, 3 mg).          Objective   /78   Ht 165.1 cm (65\")   Wt 91.2 kg (201 lb)   BMI 33.45 kg/m²   Physical Exam  Vitals and nursing note reviewed. Exam conducted with a chaperone present.   Constitutional:       General: She is not in acute distress.     Appearance: She is well-developed.   HENT:      Head: Normocephalic and atraumatic.   Cardiovascular:      Rate and Rhythm: Normal rate and regular rhythm.      Heart sounds: No murmur heard.  Pulmonary:      Effort: Pulmonary effort is normal.      Breath sounds: Normal breath sounds.   Chest:   Breasts:     Right: No inverted nipple, mass or skin change.      Left: No inverted nipple, mass or skin change.   Abdominal:      General: There is no distension.      Palpations: Abdomen is soft.      Tenderness: There is no abdominal tenderness.   Genitourinary:     General: Normal vulva.      Exam position: Lithotomy position.      Labia:         Right: No tenderness or lesion.         Left: No tenderness or lesion.       Urethra: " No prolapse.      Vagina: Normal. No vaginal discharge, tenderness or bleeding.      Cervix: No cervical motion tenderness, discharge or friability.      Adnexa:         Right: No tenderness or fullness.          Left: No tenderness or fullness.        Rectum: Normal. No external hemorrhoid or internal hemorrhoid. Normal anal tone.   Musculoskeletal:         General: Normal range of motion.      Cervical back: Normal range of motion and neck supple.   Skin:     General: Skin is warm and dry.   Neurological:      Mental Status: She is alert and oriented to person, place, and time.   Psychiatric:         Behavior: Behavior normal.         Judgment: Judgment normal.              Assessment & Plan    Diagnoses and all orders for this visit:    1. Annual gyn examination: Patient with persistent feeling of being excessively cold all the time, and is just had an evaluation with heme-onc.  All of her labs there were normal.  Patient also complains of inability to lose weight despite hardly eating at all.  The patient is sometimes fasting for 2 or 3 days at a time.  Regular self breast exam encouraged, and new mammogram has already been ordered for this year.  Patient had a normal bone density on DEXA in 2020; study not indicated this year.  Pap not indicated because patient has no history of abnormals and just had a normal last year.  Routine screening labs with PCP.    3. BMI 33.0-33.9,adult: Already exercising regularly.   Patient agreeable to referral for dietary consult.  We have discussed how prolonged fasting may actually be driving down her metabolic rate.    4. Non-smoker    5. Vaginal discharge: Patient advised that she likely has atrophic vaginitis.  BV and yeast testing was done last year, we had the same discussion.  Estrace vaginal cream was sent at that time, but the patient is not using it.    Patient with calcifications in right breast that appear to be stable, but is being followed with breast imaging every  6 months; she has a mammogram tomorrow.  Patient is already had shingles vaccination and got a flu shot this year.  Her screening colonoscopy was performed last year.      This note was electronically signed.    Kalani eNwsome MD  11/8/2022  13:37 CST

## 2022-11-09 ENCOUNTER — HOSPITAL ENCOUNTER (OUTPATIENT)
Dept: MAMMOGRAPHY | Facility: HOSPITAL | Age: 62
Discharge: HOME | End: 2022-11-09
Admitting: OBSTETRICS & GYNECOLOGY

## 2022-11-09 DIAGNOSIS — Z12.31 ENCOUNTER FOR SCREENING MAMMOGRAM FOR MALIGNANT NEOPLASM OF BREAST: ICD-10-CM

## 2022-11-09 PROCEDURE — 77063 BREAST TOMOSYNTHESIS BI: CPT

## 2022-11-09 PROCEDURE — 77067 SCR MAMMO BI INCL CAD: CPT

## 2022-11-15 ENCOUNTER — TELEPHONE (OUTPATIENT)
Dept: OBSTETRICS AND GYNECOLOGY | Facility: CLINIC | Age: 62
End: 2022-11-15

## 2022-11-15 NOTE — TELEPHONE ENCOUNTER
Caller: Radha Wolff    Relationship: Self    Best call back number: [phone]-PLEASE CALL AFTER 3:15PM. PT DOES NOT HAVE  SET UP    Caller requesting test results: PT    What test was performed: MAMMOGRAM    When was the test performed: 11.09.22    Where was the test performed: [facility]    Additional notes: PT WOULD LIKE A CALL BACK TO GO OVER MAMMOGRAM RESULTS. PT WOULD ALSO LIKE TO KNOW IF A 3D MAMMOGRAM WAS PERFORMED 11.09.22.    PT IS SCHEDULED FOR AN ULTRASOUND 12.20.22. PT WOULD LIKE TO KNOW IFA SOONER ULTRASOUND IS NEED. PT WOULD LIKE TO KNOW IF DR. PAULA WILL BE ABLE TO READ ULTRASOUND RESULTS BEFORE FOLLOW UP APPT AT 2:45 ON 12.20.22

## 2022-11-18 DIAGNOSIS — F41.9 ANXIETY: ICD-10-CM

## 2022-11-18 RX ORDER — LORAZEPAM 1 MG/1
1 TABLET ORAL EVERY 8 HOURS PRN
Qty: 30 TABLET | Refills: 0 | Status: SHIPPED | OUTPATIENT
Start: 2022-11-18 | End: 2022-11-18 | Stop reason: SDUPTHER

## 2022-11-18 RX ORDER — LORAZEPAM 1 MG/1
1 TABLET ORAL EVERY 8 HOURS PRN
Qty: 90 TABLET | Refills: 0 | Status: SHIPPED | OUTPATIENT
Start: 2022-11-18 | End: 2022-12-29

## 2022-11-22 ENCOUNTER — TELEPHONE (OUTPATIENT)
Dept: FAMILY MEDICINE CLINIC | Facility: CLINIC | Age: 62
End: 2022-11-22

## 2022-11-22 RX ORDER — METHYLPREDNISOLONE 4 MG/1
TABLET ORAL
Qty: 21 TABLET | Refills: 0 | Status: SHIPPED | OUTPATIENT
Start: 2022-11-22 | End: 2022-12-19

## 2022-11-22 RX ORDER — AZITHROMYCIN 250 MG/1
TABLET, FILM COATED ORAL
Qty: 6 TABLET | Refills: 0 | Status: SHIPPED | OUTPATIENT
Start: 2022-11-22 | End: 2022-12-19

## 2022-11-22 NOTE — TELEPHONE ENCOUNTER
Pt called and stated that she has not felt good for couple days she feels hoarse and her chest feels heavy(seatbelt bothers her to sit on her chest)she thinks she has bronchitis/pneumo no fever but some nasal draiange with blood. [phone]

## 2022-12-14 ENCOUNTER — TREATMENT (OUTPATIENT)
Dept: PHYSICAL THERAPY | Facility: CLINIC | Age: 62
End: 2022-12-14

## 2022-12-14 DIAGNOSIS — I89.0 LYMPHEDEMA OF LEFT LOWER EXTREMITY: Primary | ICD-10-CM

## 2022-12-14 DIAGNOSIS — R60.9 LIPEDEMA: ICD-10-CM

## 2022-12-14 PROCEDURE — 97162 PT EVAL MOD COMPLEX 30 MIN: CPT | Performed by: PHYSICAL THERAPIST

## 2022-12-14 NOTE — PROGRESS NOTES
Physical Therapy Initial Evaluation and Plan of Care  [address] [place] [address]    Patient: Radha Wolff             : 1960  Today's Date: 2022  Referring practitioner: EILEEN Woody  Date of Initial Visit: 2022  Patient seen for 1 sessions    Visit Diagnoses:    ICD-10-CM ICD-9-CM   1. Lymphedema of left lower extremity  I89.0 457.1   2. 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      Past Surgical History:   Procedure Laterality Date   • ADENOIDECTOMY     • BILATERAL INSERTION OF EAR TUBES AND ADENOIDECTOMY     • COLONOSCOPY N/A 2022    Procedure: COLONOSCOPY WITH ANESTHESIA;  Surgeon: Bharat Vallejo MD;  Location: [facility] ENDOSCOPY;  Service: Gastroenterology;  Laterality: N/A;  pre screen  post; polyps   MarbinDaren MD   • LAPAROSCOPIC CHOLECYSTECTOMY     • SKIN LESION EXCISION Left     SCC of the Left upper arm   • TONSILLECTOMY         SUBJECTIVE     Subjective Evaluation    History of Present Illness  Date of onset: 2020  Mechanism of injury: She had a weight gain of 20 lbs after menopause and noticed swelling at the L ankle. She has been seen for her circulation in her legs. She has tried 20-30 mmHg compression socks and does still wear them at times. However, she can't tolerate them for long. She has 15-20 nnHG socks that she does wear every day.      Patient Occupation: Substitute  Quality of life: good         The patient living arrangement includes home independently. Their home accessibility includes 1-story house/ trailer. Their home assistive devices and adaptive equipment includes  None.           OBJECTIVE     Objective    Lymphedema     Row Name 12/14/22 1500             Subjective Pain    Able to rate subjective pain? yes  -HR      Pre-Treatment Pain Level 0  -HR         Lymphedema Assessment    Lymphedema Classification LLE:  -HR      Lymphedema Surgery Comments no abdominal surgeries to speak of .  -HR      Lymphedema Assessment Comments Poison ivy severe case  -HR         Skin Changes/Observations    Location/Assessment Lower Extremity  -HR         Lymphedema Measurements    Measurement Type(s) Circumferential  -HR      Circumferential Areas Lower extremities  -HR      Lymphedema Measurements Comments upper thighs: L 77cm, R 80cm  -HR         BLE Circumferential (cm)    Measurement Location 1 BOT  -HR      Left 1 21.7 cm  -HR      Right 1 21.2 cm  -HR      Measurement Location 2 +10  -HR      Left 2 26.8 cm  -HR      Right 2 26 cm  -HR      Measurement Location 3 +10  -HR      Left 3 33.9 cm  -HR      Right 3 33.8 cm  -HR      Measurement Location 4 +10  -HR      Left 4 43.4 cm  -HR      Right 4 43 cm  -HR      Measurement Location 5 +10  -HR      Left 5 46 cm  -HR      Right 5 45.9 cm  -HR      Measurement Location 6 +10  -HR      Left 6 44.5 cm  -HR      Right 6 49 cm  -HR      Measurement Location 7 +10  -HR      Left 7 58.9 cm  -HR      Right 7 62 cm  -HR      Measurement Location 8 +10  -HR      Left 8 67.3 cm  -HR      Right 8 69.3 cm  -HR      LLE Circumferential Total 342.5 cm  -HR      RLE Circumferential Total 350.2 cm  -HR         Compression/Skin Care    Compression/Skin Care Comments Gave printout of bioflect leggings. looks like she would need 2X  -HR            User Key  (r) = Recorded By, (t) = Taken By, (c) = Cosigned By    Initials Name Provider Type    HR Yojana Carrasco, PT, DPT, CLT-MARY Physical Therapist              Therapy Education/Self Care [id number]   Education offered today CDT   Medbride Code    Ongoing HEP   Look into leggings.    Timed Minutes         Total Timed Treatment:        mins  Total Time of Visit:            60   mins    ASSESSMENT/PLAN     Goals                                          Progress Note due by 1/13/23                                                      Recert due by 3/13/23   STG by: 4 weeks Comments Date Status   Patient will have a good basic understanding of lymphedema and its suggested risk reduction practices      Patient will be independent with remedial HEP for lymphedema      Patient will be independent with self MLD for lymphedema            LTG by: 12 weeks      Patient will have appropriate compression garments for lymphedema maintenance      Patient will have no sign or symptoms of infection      Patient will be independent with comprehensive maintenance program for lymphedema                          Assessment & Plan     Assessment  Impairments: lacks appropriate home exercise program    Assessment details: Jeannie has s/s consistent with lipedema that has now progressed to having a lymphatic component as well. She would benefit from a full course of CDT with education on self care, exercise, compression garments and self massage. She is eager to learn about how to improve her condition.   Prognosis: good    Plan  Therapy options: will be seen for skilled therapy services  Planned modality interventions: low level laser therapy  Planned therapy interventions: manual therapy, therapeutic activities, home exercise program and compression  Frequency: 2x week  Duration in weeks: 12  Treatment plan discussed with: patient  Plan details: PT to see for CDT to BLE for lympho-lipedema.          SIGNATURE: Yojana Carrasco, PT, DPT, CLROSSY-STEPHENIE HERNANDEZ License #: [id number]  Electronically Signed on 12/14/2022    Initial Certification  Certification Period: 12/14/2022 through 3/13/2023  I certify that the therapy services are furnished while this patient is under my care.  The services outlined above are required by this patient, and  will be reviewed every 90 days.     PHYSICIAN: Juliana Strickland APRN (NPI: [id number])    Signature:_________________________________________DATE: ________      Please sign and return via fax to [phone].   Thank you so much for letting us work with Radha. I appreciate your letting us work with your patients. If you have any questions or concerns, please don't hesitate to contact me.          [address] [address]  [phone]

## 2022-12-19 ENCOUNTER — OFFICE VISIT (OUTPATIENT)
Dept: FAMILY MEDICINE CLINIC | Facility: CLINIC | Age: 62
End: 2022-12-19

## 2022-12-19 VITALS
SYSTOLIC BLOOD PRESSURE: 122 MMHG | RESPIRATION RATE: 16 BRPM | HEIGHT: 65 IN | TEMPERATURE: 98.5 F | HEART RATE: 70 BPM | BODY MASS INDEX: 34.66 KG/M2 | DIASTOLIC BLOOD PRESSURE: 76 MMHG | OXYGEN SATURATION: 98 % | WEIGHT: 208 LBS

## 2022-12-19 DIAGNOSIS — F41.9 ANXIETY: ICD-10-CM

## 2022-12-19 DIAGNOSIS — I10 ESSENTIAL HYPERTENSION: Primary | ICD-10-CM

## 2022-12-19 PROCEDURE — 99213 OFFICE O/P EST LOW 20 MIN: CPT | Performed by: FAMILY MEDICINE

## 2022-12-19 NOTE — PROGRESS NOTES
Subjective   Radha Wolff is a 61 y.o. female.     Chief Complaint   Patient presents with   • Hypertension        History of Present Illness     she notes having good  bp control without cp or ha--she is noting anxiety during the holidays--she went to [facility] [place] anddid a scan of her hands and told her parathyroid gland was not wrokiong      Current Outpatient Medications:   •  cetirizine (zyrTEC) 10 MG tablet, Take 10 mg by mouth Daily., Disp: , Rfl:   •  diphenhydrAMINE (BENADRYL) 25 mg capsule, Take 25 mg by mouth As Needed., Disp: , Rfl:   •  hydroCHLOROthiazide (MICROZIDE) 12.5 MG capsule, TAKE 1 CAPSULE BY MOUTH DAILY., Disp: 90 capsule, Rfl: 1  •  LORazepam (ATIVAN) 1 MG tablet, Take 1 tablet by mouth Every 8 (Eight) Hours As Needed for Anxiety., Disp: 90 tablet, Rfl: 0  •  meclizine (ANTIVERT) 25 MG tablet, Take 1 tablet by mouth 3 (Three) Times a Day As Needed for Dizziness., Disp: 60 tablet, Rfl: 0  •  melatonin 5 MG tablet tablet, Take 10 mg by mouth Every Night., Disp: , Rfl:   Allergies   Allergen Reactions   • Bee Venom Nausea And Vomiting   • Latex Hives     01/18/2005-Latex unspecified     • Levofloxacin Delirium   • Poison Ivy Extract Hives   • Red Dye Itching     06/22/2006-Skin itch     • Tetracycline Other (See Comments)     01/18/2005-Tetracycline intolerant     • Adhesive Tape Rash   • Tegaderm Ag Mesh [Silver] Rash   • Wasp Venom Rash       BMI is >= 30 and <35. (Class 1 Obesity). 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 ENDOSCOPY;  Service: Gastroenterology;  Laterality: N/A;  pre screen  post; polyps   Daren Zazueta MD   • LAPAROSCOPIC CHOLECYSTECTOMY     • SKIN LESION EXCISION Left     SCC of the Left upper arm   • TONSILLECTOMY         Review of Systems   Constitutional: Negative.    HENT: Negative.    Eyes: Negative.    Respiratory: Negative.    Cardiovascular: Negative.    Gastrointestinal: Negative.    Endocrine: Negative.    Genitourinary: Negative.    Musculoskeletal: Negative.    Skin: Negative.    Allergic/Immunologic: Negative.    Neurological: Negative.    Hematological: Negative.    Psychiatric/Behavioral: Negative.        Objective  /76   Pulse 70   Temp 98.5 °F (36.9 °C)   Resp 16   Ht 165.1 cm (65\")   Wt 94.3 kg (208 lb)   SpO2 98%   BMI 34.61 kg/m²   Physical Exam  Vitals and nursing note reviewed.   Constitutional:       Appearance: Normal appearance. She is normal weight.   HENT:      Head: Normocephalic and atraumatic.      Nose: Nose normal.      Mouth/Throat:      Mouth: Mucous membranes are dry.      Pharynx: Oropharynx is clear.   Eyes:      Extraocular Movements: Extraocular movements intact.      Conjunctiva/sclera: Conjunctivae normal.      Pupils: Pupils are equal, round, and reactive to light.   Cardiovascular:      Rate and Rhythm: Normal rate and regular rhythm.      Pulses: Normal pulses.      Heart sounds: Normal heart sounds.   Pulmonary:      Effort: Pulmonary effort is normal.      Breath sounds: Normal breath sounds.   Abdominal:      General: Abdomen is flat. Bowel sounds are normal.      Palpations: Abdomen is soft.   Musculoskeletal:         General: Normal range of motion.      Cervical back: Normal range of motion and neck supple.   Skin:     General: Skin is warm and dry.      Capillary Refill: Capillary refill takes less than 2 " seconds.   Neurological:      General: No focal deficit present.      Mental Status: She is alert and oriented to person, place, and time. Mental status is at baseline.   Psychiatric:         Mood and Affect: Mood normal.         Assessment & Plan   Diagnoses and all orders for this visit:    1. Essential hypertension (Primary)  -     Lipid Panel With / Chol / HDL Ratio  -     Hepatitis C Antibody  -     PTH, Intact & Calcium    2. Anxiety  -     Lipid Panel With / Chol / HDL Ratio  -     Hepatitis C Antibody  -     PTH, Intact & Calcium                 Orders Placed This Encounter   Procedures   • Lipid Panel With / Chol / HDL Ratio     Order Specific Question:   Release to patient     Answer:   Routine Release   • Hepatitis C Antibody     Order Specific Question:   Release to patient     Answer:   Routine Release   • PTH, Intact & Calcium     Order Specific Question:   Release to patient     Answer:   Routine Release       Follow up: 6 month(s)

## 2022-12-20 ENCOUNTER — OFFICE VISIT (OUTPATIENT)
Dept: OBSTETRICS AND GYNECOLOGY | Facility: CLINIC | Age: 62
End: 2022-12-20

## 2022-12-20 VITALS
DIASTOLIC BLOOD PRESSURE: 82 MMHG | WEIGHT: 204 LBS | BODY MASS INDEX: 33.99 KG/M2 | HEIGHT: 65 IN | SYSTOLIC BLOOD PRESSURE: 140 MMHG

## 2022-12-20 DIAGNOSIS — R93.89 THICKENED ENDOMETRIUM: Primary | ICD-10-CM

## 2022-12-20 DIAGNOSIS — Z78.0 MENOPAUSE: ICD-10-CM

## 2022-12-20 LAB
CALCIUM SERPL-MCNC: 9.7 MG/DL (ref 8.7–10.2)
CHOLEST SERPL-MCNC: 220 MG/DL (ref 100–199)
CHOLEST/HDLC SERPL: 2.7 RATIO (ref 0–4.4)
HCV AB S/CO SERPL IA: <0.1 S/CO RATIO (ref 0–0.9)
HDLC SERPL-MCNC: 83 MG/DL
INTACT PTH: NORMAL
LDLC SERPL CALC-MCNC: 128 MG/DL (ref 0–99)
PTH-INTACT SERPL-MCNC: 35 PG/ML (ref 15–65)
TRIGL SERPL-MCNC: 54 MG/DL (ref 0–149)
VLDLC SERPL CALC-MCNC: 9 MG/DL (ref 5–40)

## 2022-12-20 PROCEDURE — 99213 OFFICE O/P EST LOW 20 MIN: CPT | Performed by: OBSTETRICS & GYNECOLOGY

## 2022-12-20 RX ORDER — MULTIPLE VITAMINS W/ MINERALS TAB 9MG-400MCG
1 TAB ORAL DAILY
COMMUNITY

## 2022-12-20 NOTE — PROGRESS NOTES
"Subjective   Chief Complaint   Patient presents with   • thickened endometrium     Pt here today for follow up on endometrial thickening. Pt had ultrasound today. Pt voices no other concerns.      Radha Wolff is a 61 y.o. year old .  No LMP recorded. Patient is postmenopausal.  She presents to be seen for follow-up of \"questionable endometrial thickening\".  Patient with a CT scan of her abdomen and pelvis earlier this year, with incidental note about endometrial lining.  Patient is not having any pelvic pain, and has not had any vaginal bleeding or spotting.    The following portions of the patient's history were reviewed and updated as appropriate:current medications and allergies    Social History    Tobacco Use      Smoking status: Never      Smokeless tobacco: Never    Review of Systems   Constitutional: Negative for activity change and unexpected weight change.   Respiratory: Negative for shortness of breath.    Cardiovascular: Negative for chest pain.   Gastrointestinal: Negative for abdominal pain.   Genitourinary: Negative for pelvic pain and vaginal bleeding.         Objective   /82   Ht 165.1 cm (65\")   Wt 92.5 kg (204 lb)   BMI 33.95 kg/m²     Physical Exam  Vitals and nursing note reviewed.   Constitutional:       General: She is not in acute distress.     Appearance: Normal appearance. She is well-developed.   HENT:      Head: Normocephalic and atraumatic.   Neck:      Thyroid: No thyromegaly.   Pulmonary:      Effort: Pulmonary effort is normal.   Musculoskeletal:         General: Normal range of motion.      Cervical back: Normal range of motion.   Skin:     General: Skin is warm and dry.   Neurological:      Mental Status: She is alert and oriented to person, place, and time.   Psychiatric:         Behavior: Behavior normal.         Judgment: Judgment normal.         Lab Review   No data reviewed    Imaging   Pelvic ultrasound report     Assessment & Plan    Diagnoses and all " "orders for this visit:    1. Thickened endometrium (Primary): \"Questionable endometrial thickening\" on a CT of the abdomen and pelvis earlier this year.  Patient has not had any pelvic pain, vaginal bleeding.  Pelvic ultrasound today shows a completely normal sized uterus with an endometrial lining of 3.3 mm.  Patient reassured that this is normal and does not need any further follow-up.  We have reviewed the risk factors for uterine cancer, of which patient has nulliparity, chronic hypertension, obesity, and prolonged estrogen effect since she had menarche at only 10 years old patient advised to return to office if she has any vaginal bleeding, even just dark discharge.    2. Menopause    3. BMI 33.0-33.9,adult    This note was electronically signed.    Kalani Newsome MD  December 20, 2022  15:59 CST    Total time spent today with Radha  was 20-29 minutes (level 3).  Greater than 50% of the time was spent coordinating care, answering her questions and counseling regarding pathophysiology of her presenting problem along with plans for any diagnositc work-up and treatment.      "

## 2022-12-21 ENCOUNTER — TELEPHONE (OUTPATIENT)
Dept: PHYSICAL THERAPY | Facility: CLINIC | Age: 62
End: 2022-12-21

## 2022-12-28 ENCOUNTER — TREATMENT (OUTPATIENT)
Dept: PHYSICAL THERAPY | Facility: CLINIC | Age: 62
End: 2022-12-28

## 2022-12-28 DIAGNOSIS — I89.0 LYMPHEDEMA OF LEFT LOWER EXTREMITY: Primary | ICD-10-CM

## 2022-12-28 DIAGNOSIS — R60.9 LIPEDEMA: ICD-10-CM

## 2022-12-28 PROCEDURE — 97140 MANUAL THERAPY 1/> REGIONS: CPT | Performed by: PHYSICAL THERAPIST

## 2022-12-29 DIAGNOSIS — F41.9 ANXIETY: ICD-10-CM

## 2022-12-29 RX ORDER — LORAZEPAM 1 MG/1
1 TABLET ORAL EVERY 8 HOURS PRN
Qty: 90 TABLET | Refills: 0 | Status: SHIPPED | OUTPATIENT
Start: 2022-12-29 | End: 2023-01-30

## 2022-12-30 NOTE — PROGRESS NOTES
Physical Therapy Treatment Note  [address] [place] [address]    Patient: Radha Wolff                                                 Visit Date: 2022  :     1960    Referring practitioner:    EILEEN Woody  Date of Initial Visit:          Type: THERAPY  Noted: 2022    Patient seen for 2 sessions    Visit Diagnoses:    ICD-10-CM ICD-9-CM   1. Lymphedema of left lower extremity  I89.0 457.1   2. Lipedema  R60.9 782.3     SUBJECTIVE     Subjective     PAIN: 0/10         OBJECTIVE     Objective       22 1400   Lymphedema Measurements   Measurement Type(s) Circumferential   Circumferential Areas Lower extremities   BLE Circumferential (cm)   Measurement Location 1 BOT   Left 1 21.9 cm   Right 1 21.4 cm   Measurement Location 2 +10   Left 2 26.3 cm   Right 2 25.9 cm   Measurement Location 3 +10   Left 3 33.3 cm   Right 3 31.8 cm   Measurement Location 4 +10   Left 4 42.9 cm   Right 4 41.5 cm   Measurement Location 5 +10   Left 5 45.7 cm   Right 5 44.8 cm   Measurement Location 6 +10   Left 6 44.8 cm   Right 6 46.2 cm   Measurement Location 7 +10   Measurement Location 8 +10   LLE Circumferential Total 214.9 cm   RLE Circumferential Total 211.6 cm   RLE Circumferential (cm)   Measurement Location 1 BOT   Measurement Location 2 +10   Measurement Location 3 +10   Measurement Location 4 +10   Measurement Location 5 +10   Measurement Location 6 +10   Measurement Location 7 +10   Measurement Location 8 +10   Manual Lymphatic Drainage   Manual Lymphatic Drainage initial sequence;opened regional lymph nodes;opened anastamoses;extremity treatment   Initial Sequence short neck;abdomen;diaphragmatic breathing   Abdomen superficial   Opened Regional Lymph Nodes axillary;inguinal   Axillary right;left   Inguinal right;left   Opened Anastamoses inguino-axillary   Inguino-Axillary right;left   Extremity Treatment MLD to full  limb       Therapy Education/Self Care 11840   Education offered today Need to schedule more visits. 60 minutes instead of 45   Medbride Code    Ongoing HEP      Timed Minutes        Total Timed Treatment:     45   mins  Total Time of Visit:             45   mins         ASSESSMENT/PLAN     GOALS  Goals                                          Progress Note due by 1/13/23                                                      Recert due by 3/13/23   STG by: 4 weeks Comments Date Status   Patient will have a good basic understanding of lymphedema and its suggested risk reduction practices         Patient will be independent with remedial HEP for lymphedema         Patient will be independent with self MLD for lymphedema                   LTG by: 12 weeks         Patient will have appropriate compression garments for lymphedema maintenance         Patient will have no sign or symptoms of infection         Patient will be independent with comprehensive maintenance program for lymphedema                                           Assessment/Plan     ASSESSMENT: Her S.O. was really sick so she had to cancel an appointment since she didn't want to bring the bug here. The appt was only made for 45 minutes, so I had to rush through a bit today. Will move forward with more education and instruction next time.     PLAN: Cont CDT for BLE    SIGNATURE: Yojana Carrasco, PT, DPT, CLROSSY-STEPHENIE HERNANDEZ License #: [id number]  Electronically Signed on 12/30/2022        [address] [place] [address]  [phone]

## 2023-01-11 DIAGNOSIS — J30.89 NON-SEASONAL ALLERGIC RHINITIS, UNSPECIFIED TRIGGER: ICD-10-CM

## 2023-01-11 RX ORDER — CETIRIZINE HCL/PSEUDOEPHEDRINE 5 MG-120MG
TABLET, EXTENDED RELEASE 12 HR ORAL
Qty: 24 TABLET | Refills: 0 | OUTPATIENT
Start: 2023-01-11

## 2023-01-11 NOTE — TELEPHONE ENCOUNTER
[facility] WNL    Rx Refill Note  Requested Prescriptions     Pending Prescriptions Disp Refills   • ZyrTEC-D Allergy & Congestion 5-120 MG per 12 hr tablet [Pharmacy Med Name: ZYRTEC-D ALLERGY/CONGESTION 5-120MG TABLET ER 12HR] 24 tablet 0     Sig: TAKE 1 TABLET BY MOUTH 2 (TWO) TIMES A DAY.      Last office visit with prescribing clinician: 2/25/2022      Next office visit with prescribing clinician: Visit date not found            Neil Singh MA  01/11/23, 11:47 CST

## 2023-01-11 NOTE — TELEPHONE ENCOUNTER
Can get OTC if needed.  Will need to be seen to get through us.    Electronically signed by EILEEN Thompson, 01/11/23, 12:43 PM CST.

## 2023-01-16 ENCOUNTER — TREATMENT (OUTPATIENT)
Dept: PHYSICAL THERAPY | Facility: CLINIC | Age: 63
End: 2023-01-16
Payer: COMMERCIAL

## 2023-01-16 DIAGNOSIS — I89.0 LYMPHEDEMA OF LEFT LOWER EXTREMITY: Primary | ICD-10-CM

## 2023-01-16 DIAGNOSIS — R60.9 LIPEDEMA: ICD-10-CM

## 2023-01-16 PROCEDURE — 97140 MANUAL THERAPY 1/> REGIONS: CPT | Performed by: PHYSICAL THERAPIST

## 2023-01-16 PROCEDURE — 97535 SELF CARE MNGMENT TRAINING: CPT | Performed by: PHYSICAL THERAPIST

## 2023-01-16 NOTE — PROGRESS NOTES
Physical Therapy Treatment Note  [address] [place] [address]    Patient: Radha Wolff                                                 Visit Date: 2023  :     1960    Referring practitioner:    EILEEN Woody  Date of Initial Visit:          Type: THERAPY  Noted: 2022    Patient seen for 3 sessions    Visit Diagnoses:    ICD-10-CM ICD-9-CM   1. Lymphedema of left lower extremity  I89.0 457.1   2. Lipedema  R60.9 782.3     SUBJECTIVE     Subjective  She has the compression leggings now, she has soreness around her belly button and feels a little sick to her stomach.  She wears her compression all day and it takes about 4 hours for the pattern to go away.  y      PAIN: 0/10         OBJECTIVE     Objective    Lymphedema     Row Name 23 0800             Subjective Pain    Able to rate subjective pain? yes  -AL      Pre-Treatment Pain Level 0  -AL         Manual Lymphatic Drainage    Manual Lymphatic Drainage initial sequence;opened regional lymph nodes;opened anastamoses;extremity treatment  -AL      Initial Sequence short neck;abdomen;diaphragmatic breathing  -AL      Abdomen superficial  -AL      Diaphragmatic Breathing x 9 with superficial abdominals  -AL      Opened Regional Lymph Nodes axillary;inguinal  -AL      Axillary right;left  -AL      Inguinal right;left  -AL      Opened Anastamoses inguino-axillary  -AL      Inguino-Axillary right;left  -AL      Extremity Treatment MLD to full limb  -AL      MLD to Full Limb B LE  -AL      Manual Lymphatic Drainage Comments IASTM to B LE's with red roller  -AL      Manual Therapy 45  -AL         Compression/Skin Care    Compression/Skin Care Comments She is wearing her compression today  -AL            User Key  (r) = Recorded By, (t) = Taken By, (c) = Cosigned By    Initials Name Provider Type    AL Jenna Schultz, PTA, RISA Physical Therapist Assistant                      Therapy Education/Self Care [id number]   Education offered today Instructed on HEP, MLD, and educated about the lymph system   Medbridge Code    Ongoing HEP   Work on CDT   Timed Minutes        Total Timed Treatment:     60   mins  Total Time of Visit:             60   mins         ASSESSMENT/PLAN     GOALS        Goals                                          Progress Note due by 1/13/23                                                      Recert due by 3/13/23   STG by: 4 weeks Comments Date Status   Patient will have a good basic understanding of lymphedema and its suggested risk reduction practices  Educated during treatment 1/16 Ongoing   Patient will be independent with remedial HEP for lymphedema Instructed on the HEP 1/16 Ongoing   Patient will be independent with self MLD for lymphedema Instructed on the MLD 1/16 Ongoing             LTG by: 12 weeks         Patient will have appropriate compression garments for lymphedema maintenance         Patient will have no sign or symptoms of infection         Patient will be independent with comprehensive maintenance program for lymphedema                                           Assessment/Plan     ASSESSMENT:  She will try to work on the HEP and MLD, she is very busy with work and home.  We did discuss the Flexitouch pump today, she has been told in the past she would have to wear it all night.  I did tell her it would just be for 1 hour a day, we will discuss the pump again. She is complaint with wearing the compression garments.     PLAN: Cont CDT for BLE    SIGNATURE: Jenna Schultz PTA, [place], [place] License #: [id number]  Electronically Signed on 1/16/2023        [address] [place] [address]. [address]  [phone]

## 2023-01-19 ENCOUNTER — TREATMENT (OUTPATIENT)
Dept: PHYSICAL THERAPY | Facility: CLINIC | Age: 63
End: 2023-01-19
Payer: COMMERCIAL

## 2023-01-19 DIAGNOSIS — R60.9 LIPEDEMA: ICD-10-CM

## 2023-01-19 DIAGNOSIS — I89.0 LYMPHEDEMA OF LEFT LOWER EXTREMITY: Primary | ICD-10-CM

## 2023-01-19 PROCEDURE — 97140 MANUAL THERAPY 1/> REGIONS: CPT | Performed by: PHYSICAL THERAPIST

## 2023-01-19 NOTE — PROGRESS NOTES
Physical Therapy Treatment Note and 30 Day Progress Note  [address] [place] [address]    Patient: Radha Wolff                                                 Visit Date: 2023  :     1960    Referring practitioner:    EILEEN Woody  Date of Initial Visit:          Type: THERAPY  Noted: 2022    Patient seen for 4 sessions    Visit Diagnoses:    ICD-10-CM ICD-9-CM   1. Lymphedema of left lower extremity  I89.0 457.1   2. Lipedema  R60.9 782.3     SUBJECTIVE     Subjective  She is still sore around her belly, she thinks the compression is making her sore. She did try the MLD.       PAIN: 0/10         OBJECTIVE     Objective    Lymphedema     Row Name 23 1530             Subjective Pain    Able to rate subjective pain? yes  -AL      Pre-Treatment Pain Level 0  -AL         Lymphedema Measurements    Measurement Type(s) Circumferential  -AL      Circumferential Areas Lower extremities  -AL         BLE Circumferential (cm)    Measurement Location 1 BOT  -AL      Left 1 21.6 cm  -AL      Right 1 21.5 cm  -AL      Measurement Location 2 +10  -AL      Left 2 26.5 cm  -AL      Right 2 26.1 cm  -AL      Measurement Location 3 +10  -AL      Left 3 33.9 cm  -AL      Right 3 31.7 cm  -AL      Measurement Location 4 +10  -AL      Left 4 42 cm  -AL      Right 4 41.2 cm  -AL      Measurement Location 5 +10  -AL      Left 5 44.6 cm  -AL      Right 5 45.1 cm  -AL      Measurement Location 6 +10  -AL      Left 6 43.8 cm  -AL      Right 6 46 cm  -AL      Measurement Location 7 +10  -AL      Left 7 56.4 cm  -AL      Right 7 57.8 cm  -AL      Measurement Location 8 +10  -AL      Left 8 64.5 cm  -AL      Right 8 67.5 cm  -AL      LLE Circumferential Total 333.3 cm  -AL      RLE Circumferential Total 336.9 cm  -AL         Manual Lymphatic Drainage    Manual Lymphatic Drainage initial sequence;opened regional lymph nodes;opened  anastamoses;extremity treatment  -AL      Initial Sequence short neck;abdomen;diaphragmatic breathing  -AL      Abdomen superficial  -AL      Diaphragmatic Breathing x 9 with superficial abdominals  -AL      Opened Regional Lymph Nodes axillary;inguinal  -AL      Axillary right;left  -AL      Inguinal right;left  -AL      Opened Anastamoses inguino-axillary  -AL      Inguino-Axillary right;left  -AL      Extremity Treatment MLD to full limb  -AL      MLD to Full Limb B LE  -AL      Manual Lymphatic Drainage Comments IASTM to B LE's with red roller.  Discussed the FLexitouch and using a nimi trampoline.  -AL      Manual Therapy 60  -AL         Compression/Skin Care    Compression/Skin Care Comments Continue to wear compression  -AL            User Key  (r) = Recorded By, (t) = Taken By, (c) = Cosigned By    Initials Name Provider Type    Jenna Gamez PTA, CLT-LANA Physical Therapist Assistant                     Therapy Education/Self Care [id number]   Education offered today Work on CDT   Medbridge Code    Ongoing HEP   Work on CDT   Timed Minutes        Total Timed Treatment:     60   mins  Total Time of Visit:             60   mins         ASSESSMENT/PLAN     GOALS        Goals                                          Progress Note due by 2/18/23                                                      Recert due by 3/13/23   STG by: 4 weeks Comments Date Status   Patient will have a good basic understanding of lymphedema and its suggested risk reduction practices  Continue to educated during treatment. 1/19 Ongoing   Patient will be independent with remedial HEP for lymphedema She is working on the HEP. 1/19 Ongoing   Patient will be independent with self MLD for lymphedema She is working on the MLD.  1/19 Ongoing             LTG by: 12 weeks         Patient will have appropriate compression garments for lymphedema maintenance She has good fitting compression leggings 1/19 Met   Patient will have no sign or symptoms  of infection No s/s of infection 1/19 Met   Patient will be independent with comprehensive maintenance program for lymphedema She will think about the Flexitouch pump. 1/19 Ongoing                                     Assessment/Plan     ASSESSMENT:  Patient was able to try the MLD and HEP, she is compliant with wearing her compression garments.  We discussed the Flexitouch pump again, she wants to think about if she wants the pump or not.  We also discussed the mini trampoline for gentle exercise but she is not sure if her L foot would tolerate it and she also gets dizzy.  We may have her do gentle bouncing on the trampoline next treatment.  She has had a good reduction in both LE's as compared to her initial visit.     PLAN: Cont CDT for BLE, will see patient 2 x a week x 4 weeks.     SIGNATURE: Jenna Schultz PTA, [facility] HERNA[facility]DE[facility] License #: [id number]  Electronically Signed on 1/19/2023        [address] [place] [address]. [address]  [phone]

## 2023-01-20 NOTE — PROGRESS NOTES
Progress Note Addendum      Patient: Radha Wolff           : 1960  Visit Date: 2023  Referring practitioner: EILEEN Woody  Date of Initial Visit: Type: THERAPY  Noted: 2022  Patient seen for 4 sessions  Visit Diagnoses:    ICD-10-CM ICD-9-CM   1. Lymphedema of left lower extremity  I89.0 457.1   2. Lipedema  R60.9 782.3          Clinical Progress: improved  Home Program Compliance: Yes  Progress toward previous goals: Partially Met  Prognosis to achieve goals: good    Objective     Assessment & Plan     Assessment  Impairments: lacks appropriate home exercise program  Prognosis: good    Plan  Therapy options: will be seen for skilled therapy services  Planned modality interventions: low level laser therapy  Planned therapy interventions: manual therapy, therapeutic activities, home exercise program and compression  Frequency: 2x week  Duration in weeks: 4  Treatment plan discussed with: VADIM        I reviewed their progress and plan of care with Jenna Schultz PTA, CLT-LANA. The patient is in agreement with this plan.     SIGNATURE: Kamila Boone PT DPT, License #: [id number]  Electronically Signed on 2023

## 2023-01-27 ENCOUNTER — TREATMENT (OUTPATIENT)
Dept: PHYSICAL THERAPY | Facility: CLINIC | Age: 63
End: 2023-01-27
Payer: COMMERCIAL

## 2023-01-27 DIAGNOSIS — R60.9 LIPEDEMA: ICD-10-CM

## 2023-01-27 DIAGNOSIS — I89.0 LYMPHEDEMA OF LEFT LOWER EXTREMITY: Primary | ICD-10-CM

## 2023-01-27 PROCEDURE — 97140 MANUAL THERAPY 1/> REGIONS: CPT | Performed by: PHYSICAL THERAPIST

## 2023-01-27 NOTE — PROGRESS NOTES
Physical Therapy Treatment Note  [address] [place] [address]    Patient: Radha Wolff                                                 Visit Date: 2023  :     1960    Referring practitioner:    EILEEN Woody  Date of Initial Visit:          Type: THERAPY  Noted: 2022    Patient seen for 5 sessions    Visit Diagnoses:    ICD-10-CM ICD-9-CM   1. Lymphedema of left lower extremity  I89.0 457.1   2. Lipedema  R60.9 782.3     SUBJECTIVE     Subjective  She now loves the compression leggings. She can tell a difference in her legs with 1 of her pairs of jeans but not in another. She is planning to do water aerobics this summer.        PAIN: 0/10         OBJECTIVE     Objective    Lymphedema     Row Name 23 1500             Subjective Pain    Able to rate subjective pain? yes  -HR         Manual Lymphatic Drainage    Manual Lymphatic Drainage initial sequence;opened regional lymph nodes;opened anastamoses;extremity treatment  -HR      Initial Sequence short neck;abdomen;diaphragmatic breathing  -HR      Abdomen superficial  -HR      Diaphragmatic Breathing x 9 with superficial abdominals  -HR      Opened Regional Lymph Nodes axillary;inguinal  -HR      Axillary right;left  -HR      Inguinal right;left  -HR      Opened Anastamoses inguino-axillary  -HR      Inguino-Axillary right;left  -HR      Extremity Treatment MLD to full limb  -HR      MLD to Full Limb BLE  -HR      Manual Lymphatic Drainage Comments IASTM to B LE's with red roller. Anterior thighs, medial knees and in sidelying I worked on the lateral hips and glutes  -HR      Manual Therapy 60  -HR         Compression/Skin Care    Compression/Skin Care Comments Continue to wear compression  -HR            User Key  (r) = Recorded By, (t) = Taken By, (c) = Cosigned By    Initials Name Provider Type    HR Yojana Carrasco, PT, DPT, CLT-MARY Physical  Therapist                     Therapy Education/Self Care [id number]   Education offered today Work on CDT   Medbridge Code    Ongoing HEP   Work on CDT   Timed Minutes        Total Timed Treatment:     60   mins  Total Time of Visit:             60   mins         ASSESSMENT/PLAN     GOALS        Goals                                          Progress Note due by 2/18/23                                                      Recert due by 3/13/23   STG by: 4 weeks Comments Date Status   Patient will have a good basic understanding of lymphedema and its suggested risk reduction practices  Continue to educated during treatment. 1/27 Ongoing   Patient will be independent with remedial HEP for lymphedema She is working on the HEP. 1/19 Ongoing   Patient will be independent with self MLD for lymphedema She is working on the MLD.  1/19 Ongoing             LTG by: 12 weeks         Patient will have appropriate compression garments for lymphedema maintenance She has good fitting compression leggings 1/19 Met   Patient will have no sign or symptoms of infection No s/s of infection 1/19 Met   Patient will be independent with comprehensive maintenance program for lymphedema She will think about the Flexitouch pump. 1/19 Ongoing                                     Assessment/Plan     ASSESSMENT:  Patient can see a difference in the smoothness of her legs since starting therapy. She loves the leggings and wears them daily. She has also looked into diet changes. She needs more visits, but the scheduling has been a problem.      PLAN: Cont CDT for BLE, will see patient 2 x a week x 4 weeks. Need to work on schedule.    SIGNATURE: Yojana Carrasco, PT, DPT, [facility] HERNANDEZ License #: [id number]  Electronically Signed on 1/27/2023        [address] [place] [address]. [address]  [phone]     no

## 2023-01-30 DIAGNOSIS — F41.9 ANXIETY: ICD-10-CM

## 2023-01-30 RX ORDER — LORAZEPAM 1 MG/1
1 TABLET ORAL EVERY 8 HOURS PRN
Qty: 90 TABLET | Refills: 0 | Status: SHIPPED | OUTPATIENT
Start: 2023-01-30 | End: 2023-02-27 | Stop reason: SDUPTHER

## 2023-01-30 NOTE — TELEPHONE ENCOUNTER
No CSA signed    Last filled 12-  Last office vist 12-19-22  Next office visit 6-19-23        Rx Refill Note  Requested Prescriptions     Pending Prescriptions Disp Refills   • LORazepam (ATIVAN) 1 MG tablet [Pharmacy Med Name: LORAZEPAM 1MG TABLET] 90 tablet 0     Sig: TAKE 1 TABLET BY MOUTH EVERY 8 (EIGHT) HOURS AS NEEDED FOR ANXIETY.      Last office visit with prescribing clinician: 12/19/2022      Next office visit with prescribing clinician: 6/19/2023            Jennifer Mayorga LPN  01/30/23, 09:00 CST

## 2023-02-02 ENCOUNTER — TREATMENT (OUTPATIENT)
Dept: PHYSICAL THERAPY | Facility: CLINIC | Age: 63
End: 2023-02-02
Payer: COMMERCIAL

## 2023-02-02 DIAGNOSIS — I89.0 LYMPHEDEMA OF LEFT LOWER EXTREMITY: Primary | ICD-10-CM

## 2023-02-02 DIAGNOSIS — R60.9 LIPEDEMA: ICD-10-CM

## 2023-02-02 PROCEDURE — 97140 MANUAL THERAPY 1/> REGIONS: CPT | Performed by: PHYSICAL THERAPIST

## 2023-02-02 NOTE — PROGRESS NOTES
Physical Therapy Treatment Note  [address] [place] [address]    Patient: Radha Wolff                                                 Visit Date: 2023  :     1960    Referring practitioner:    EILEEN Woody  Date of Initial Visit:          Type: THERAPY  Noted: 2022    Patient seen for 6 sessions    Visit Diagnoses:    ICD-10-CM ICD-9-CM   1. Lymphedema of left lower extremity  I89.0 457.1   2. Lipedema  R60.9 782.3     SUBJECTIVE     Subjective  She is looking for a roller to use at home. She has lost a little weight, she does not want to try the trampoline because it will make her dizzy.       PAIN: 0/10         OBJECTIVE     Objective         23 1345   Subjective Pain   Able to rate subjective pain? yes   Pre-Treatment Pain Level 0   Manual Lymphatic Drainage   Manual Lymphatic Drainage initial sequence;opened regional lymph nodes;opened anastamoses;extremity treatment   Initial Sequence short neck;abdomen;diaphragmatic breathing   Abdomen superficial   Diaphragmatic Breathing x 9 with superficial abdominals   Opened Regional Lymph Nodes axillary;inguinal   Axillary right;left   Inguinal right;left   Opened Anastamoses inguino-axillary   Inguino-Axillary right;left   Extremity Treatment MLD to full limb   MLD to Full Limb BLE   Manual Lymphatic Drainage Comments IASTM to B LE's with red roller. Anterior thighs, medial knees and in sidelying I worked on the lateral hips and glutes   Manual Therapy 70   Compression/Skin Care   Compression/Skin Care Comments Wear compression daily         Therapy Education/Self Care 41600   Education offered today Work on CDT   Medbridge Code    Ongoing HEP   Work on CDT   Timed Minutes        Total Timed Treatment:     70   mins  Total Time of Visit:             70   mins         ASSESSMENT/PLAN     GOALS        Goals                                          Progress Note  due by 2/18/23                                                      Recert due by 3/13/23   STG by: 4 weeks Comments Date Status   Patient will have a good basic understanding of lymphedema and its suggested risk reduction practices  Continue to educated during treatment. 1/27 Ongoing   Patient will be independent with remedial HEP for lymphedema She is working on the HEP. 1/19 Ongoing   Patient will be independent with self MLD for lymphedema She is working on the MLD.  2/2 Ongoing             LTG by: 12 weeks         Patient will have appropriate compression garments for lymphedema maintenance She has good fitting compression leggings 1/19 Met   Patient will have no sign or symptoms of infection No s/s of infection 1/19 Met   Patient will be independent with comprehensive maintenance program for lymphedema She will think about the Flexitouch pump. 1/19 Ongoing                                     Assessment/Plan     ASSESSMENT:  Patient is working on her diet as well as working on CDT, she has lost 4 pounds. She does have increase urination after the MLD treatments and also rests better the evening of her treatments. She will purchase more compression leggings, she is not interested in bouncing on the trampoline as this will make her dizzy.    PLAN: Cont CDT for BLE, will see patient 2 x a week x 4 weeks. Need to work on schedule.    SIGNATURE: Jenna Schultz PTA, [facility], [place] License #: [id number]  Electronically Signed on 2/7/2023        [address] [place] [address] [place] [address]  [phone]

## 2023-02-06 ENCOUNTER — TREATMENT (OUTPATIENT)
Dept: PHYSICAL THERAPY | Facility: CLINIC | Age: 63
End: 2023-02-06
Payer: COMMERCIAL

## 2023-02-06 DIAGNOSIS — I89.0 LYMPHEDEMA OF LEFT LOWER EXTREMITY: Primary | ICD-10-CM

## 2023-02-06 DIAGNOSIS — R60.9 LIPEDEMA: ICD-10-CM

## 2023-02-06 PROCEDURE — 97140 MANUAL THERAPY 1/> REGIONS: CPT | Performed by: PHYSICAL THERAPIST

## 2023-02-06 NOTE — PROGRESS NOTES
Physical Therapy Treatment Note  [address] [place] [address]    Patient: Radha Wolff                                                 Visit Date: 2023  :     1960    Referring practitioner:    EILEEN Woody  Date of Initial Visit:          Type: THERAPY  Noted: 2022    Patient seen for 7 sessions    Visit Diagnoses:    ICD-10-CM ICD-9-CM   1. Lymphedema of left lower extremity  I89.0 457.1   2. Lipedema  R60.9 782.3     SUBJECTIVE     Subjective  She is looking for a roller to use at home. She has lost a little weight, she does not want to try the trampoline because it will make her busy.       PAIN: 0/10         OBJECTIVE     Objective    Lymphedema     Row Name 23 1523             Subjective Pain    Able to rate subjective pain? yes  -AL      Pre-Treatment Pain Level 0  -AL         Manual Lymphatic Drainage    Manual Lymphatic Drainage initial sequence;opened regional lymph nodes;opened anastamoses;extremity treatment  -AL      Initial Sequence short neck;abdomen;diaphragmatic breathing  -AL      Abdomen superficial  -AL      Diaphragmatic Breathing x 9 with superficial abdominals  -AL      Opened Regional Lymph Nodes axillary;inguinal  -AL      Axillary right;left  -AL      Inguinal right;left  -AL      Opened Anastamoses inguino-axillary  -AL      Inguino-Axillary right;left  -AL      Extremity Treatment MLD to full limb  -AL      MLD to Full Limb BLE  -AL      Manual Lymphatic Drainage Comments IASTM to B LE's with red roller. Anterior thighs, medial knees and in sidelying I worked on the lateral hips and glutes  -AL      Manual Therapy 60  -AL            User Key  (r) = Recorded By, (t) = Taken By, (c) = Cosigned By    Initials Name Provider Type    AL Jenna Schultz, PTA, CLT-[facility] Physical Therapist Assistant                     Therapy Education/Self Care [id number]   Education offered today Work on CDT,  order a roller   Medbridge Code    Ongoing HEP   Work on CDT   Timed Minutes        Total Timed Treatment:     60   mins  Total Time of Visit:             60   mins         ASSESSMENT/PLAN     GOALS        Goals                                          Progress Note due by 2/18/23                                                      Recert due by 3/13/23   STG by: 4 weeks Comments Date Status   Patient will have a good basic understanding of lymphedema and its suggested risk reduction practices  Continue to educated during treatment. 1/27 Ongoing   Patient will be independent with remedial HEP for lymphedema She is working on the HEP. 1/19 Ongoing   Patient will be independent with self MLD for lymphedema She is working on the MLD and she will order a roller. 2/6 Ongoing             LTG by: 12 weeks         Patient will have appropriate compression garments for lymphedema maintenance She has good fitting compression leggings 1/19 Met   Patient will have no sign or symptoms of infection No s/s of infection 1/19 Met   Patient will be independent with comprehensive maintenance program for lymphedema She will think about the Flexitouch pump. 1/19 Ongoing                                     Assessment/Plan     ASSESSMENT:  Patient is going to order a roller to help work on softening the tissues in the legs.  She has dense tissue located at the upper legs and medial knees.  She continues to wear the compression daily.     PLAN: Cont CDT for BLE, will see patient 2 x a week x 4 weeks.     SIGNATURE: Jenna Schultz PTA, [place], [place] License #: [id number]  Electronically Signed on 2/6/2023        [address] [place] [address]. [address]  [phone]

## 2023-02-09 ENCOUNTER — OFFICE VISIT (OUTPATIENT)
Dept: FAMILY MEDICINE CLINIC | Facility: CLINIC | Age: 63
End: 2023-02-09
Payer: COMMERCIAL

## 2023-02-09 VITALS
DIASTOLIC BLOOD PRESSURE: 72 MMHG | SYSTOLIC BLOOD PRESSURE: 130 MMHG | HEART RATE: 76 BPM | RESPIRATION RATE: 18 BRPM | HEIGHT: 65 IN | WEIGHT: 213.8 LBS | BODY MASS INDEX: 35.62 KG/M2 | OXYGEN SATURATION: 98 % | TEMPERATURE: 97.1 F

## 2023-02-09 DIAGNOSIS — J01.90 ACUTE NON-RECURRENT SINUSITIS, UNSPECIFIED LOCATION: ICD-10-CM

## 2023-02-09 DIAGNOSIS — R68.89 FLU-LIKE SYMPTOMS: Primary | ICD-10-CM

## 2023-02-09 DIAGNOSIS — H93.8X3 CONGESTION OF BOTH EARS: ICD-10-CM

## 2023-02-09 LAB
EXPIRATION DATE: NORMAL
EXPIRATION DATE: NORMAL
FLUAV AG NPH QL: NEGATIVE
FLUBV AG NPH QL: NEGATIVE
INTERNAL CONTROL: NORMAL
INTERNAL CONTROL: NORMAL
Lab: NORMAL
Lab: NORMAL
SARS-COV-2 AG UPPER RESP QL IA.RAPID: NOT DETECTED

## 2023-02-09 PROCEDURE — 87426 SARSCOV CORONAVIRUS AG IA: CPT | Performed by: NURSE PRACTITIONER

## 2023-02-09 PROCEDURE — 87804 INFLUENZA ASSAY W/OPTIC: CPT | Performed by: NURSE PRACTITIONER

## 2023-02-09 PROCEDURE — 99213 OFFICE O/P EST LOW 20 MIN: CPT | Performed by: NURSE PRACTITIONER

## 2023-02-09 RX ORDER — GUAIFENESIN, PSEUDOEPHEDRINE HYDROCHLORIDE 600; 60 MG/1; MG/1
1 TABLET, EXTENDED RELEASE ORAL EVERY 12 HOURS
Qty: 14 TABLET | Refills: 0 | Status: SHIPPED | OUTPATIENT
Start: 2023-02-09

## 2023-02-09 RX ORDER — AMOXICILLIN AND CLAVULANATE POTASSIUM 875; 125 MG/1; MG/1
1 TABLET, FILM COATED ORAL 2 TIMES DAILY
Qty: 20 TABLET | Refills: 0 | Status: SHIPPED | OUTPATIENT
Start: 2023-02-09 | End: 2023-02-19

## 2023-02-09 NOTE — PROGRESS NOTES
Subjective   Chief Complaint:  Cough and congestion.    History of Present Illness:  This 62 y.o. female was seen in the office today.    The patient presents today with complaints of cough and congestion. She reports cough, nasal congestion, and sore throat onset for 1 day. She reports congestion in bilateral ears.    Allergies   Allergen Reactions   • Bee Venom Nausea And Vomiting   • Latex Hives     01/18/2005-Latex unspecified     • Levofloxacin Delirium   • Poison Ivy Extract Hives   • Red Dye Itching     06/22/2006-Skin itch     • Tetracycline Other (See Comments)     01/18/2005-Tetracycline intolerant     • Adhesive Tape Rash   • Tegaderm Ag Mesh [Silver] Rash   • Wasp Venom Rash      Current Outpatient Medications on File Prior to Visit   Medication Sig   • cetirizine (zyrTEC) 10 MG tablet Take 10 mg by mouth Daily.   • diphenhydrAMINE (BENADRYL) 25 mg capsule Take 25 mg by mouth As Needed.   • hydroCHLOROthiazide (MICROZIDE) 12.5 MG capsule TAKE 1 CAPSULE BY MOUTH DAILY.   • LORazepam (ATIVAN) 1 MG tablet TAKE 1 TABLET BY MOUTH EVERY 8 (EIGHT) HOURS AS NEEDED FOR ANXIETY.   • meclizine (ANTIVERT) 25 MG tablet Take 1 tablet by mouth 3 (Three) Times a Day As Needed for Dizziness.   • melatonin 5 MG tablet tablet Take 10 mg by mouth Every Night.   • multivitamin with minerals tablet tablet Take 1 tablet by mouth Daily.     No current facility-administered medications on file prior to visit.      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      Past Surgical History:   Procedure Laterality Date   • ADENOIDECTOMY     • BILATERAL INSERTION OF EAR TUBES AND ADENOIDECTOMY     • COLONOSCOPY N/A 02/18/2022     Procedure: COLONOSCOPY WITH ANESTHESIA;  Surgeon: Bharat Vallejo MD;  Location: [facility] ENDOSCOPY;  Service: Gastroenterology;  Laterality: N/A;  pre screen  post; polyps   Daren Zazueta MD   • LAPAROSCOPIC CHOLECYSTECTOMY     • SKIN LESION EXCISION Left     SCC of the Left upper arm   • TONSILLECTOMY       Social History     Socioeconomic History   • Marital status: Single   Tobacco Use   • Smoking status: Never   • Smokeless tobacco: Never   Substance and Sexual Activity   • Alcohol use: Not Currently   • Drug use: Never   • Sexual activity: Yes     Partners: Male     Family History   Problem Relation Age of Onset   • Lung cancer Mother    • Liver cancer Mother    • Lung cancer Father    • Obesity Brother    • Heart disease Brother    • Arthritis Brother    • No Known Problems Maternal Grandmother    • No Known Problems Paternal Grandmother    • No Known Problems Maternal Aunt    • No Known Problems Paternal Aunt    • No Known Problems Sister    • No Known Problems Daughter    • No Known Problems Son    • No Known Problems Other    • BRCA 1/2 Neg Hx    • Breast cancer Neg Hx    • Colon cancer Neg Hx    • Endometrial cancer Neg Hx    • Ovarian cancer Neg Hx    • Colon polyps Neg Hx    • Esophageal cancer Neg Hx        Objective   Physical Exam  Vitals reviewed.   Constitutional:       General: She is not in acute distress.     Appearance: Normal appearance. She is obese.   HENT:      Ears:      Comments: Bilateral mucoid appearing ear effusions.Tympanic membranes non-erythematous.      Nose: Congestion present.      Mouth/Throat:      Comments: Throat red, erythematous. No white patches.  Cardiovascular:      Rate and Rhythm: Normal rate and regular rhythm.   Pulmonary:      Effort: Pulmonary effort is normal.      Breath sounds: Normal breath sounds.     /72 (BP Location: Left arm, Patient Position: Sitting, Cuff Size: Adult)   Pulse 76   Temp 97.1 °F (36.2 °C) (Infrared)   Resp 18   Ht 165.1  "cm (65\")   Wt 97 kg (213 lb 12.8 oz)   SpO2 98%   BMI 35.58 kg/m²     Prior Visit Notes/Records, Lab, Imaging, and Diagnostic Results Reviewed:  CBC:  Lab Results - Last 18 Months   Lab Units 10/10/22  1432 06/28/22  2300 01/20/22  1036 09/24/21  0807   WBC 10*3/mm3 8.43 7.45 8.69 6.3   HEMOGLOBIN g/dL 14.1 14.0 14.9 13.4   HEMATOCRIT % 44.0 43.0 44.8 40.2   PLATELETS 10*3/mm3 380 329 353 329   IRON mcg/dL 68 95  --   --       Chemistry:  Lab Results - Last 18 Months   Lab Units 12/19/22  0719 10/10/22  1432 06/28/22  2300 01/20/22  1036 09/24/21  0807   SODIUM mmol/L  --  141 141 141 142   POTASSIUM mmol/L  --  3.8 4.5 4.7 4.6   CHLORIDE mmol/L  --  105 103 104 107*   TOTAL CO2 mmol/L  --   --  27.2 26.1 22   CO2 mmol/L  --  26.0  --   --   --    GLUCOSE mg/dL  --  95 105* 101* 92   BUN mg/dL  --  15 14 11 17   CREATININE mg/dL  --  0.66 0.82 0.70 0.77   EGFR IF NONAFRICN AM mL/min/1.73  --   --   --  86 85   EGFR IF AFRICN AM mL/min/1.73  --   --   --  104 98   EGFR RESULT mL/min/1.73  --   --  82.5  --   --    CALCIUM mg/dL 9.7 9.5 9.9 10.1 9.5     Lab Results - Last 18 Months   Lab Units 10/10/22  1432 06/28/22  2300 01/20/22  1036 09/24/21  0807   ALT (SGPT) U/L 16 12 16 11   AST (SGOT) U/L 23 16 20 20   ALK PHOS U/L 81 89 96 76       Assessment & Plan   Diagnoses and all orders for this visit:    1. Flu-like symptoms (Primary)  -     POCT VERITOR SARS-CoV-2 Antigen  -     POC Influenza A / B    2. Acute non-recurrent sinusitis, unspecified location  -     pseudoephedrine-guaifenesin (MUCINEX D)  MG per 12 hr tablet; Take 1 tablet by mouth Every 12 (Twelve) Hours.  Dispense: 14 tablet; Refill: 0    3. Congestion of both ears  -     pseudoephedrine-guaifenesin (MUCINEX D)  MG per 12 hr tablet; Take 1 tablet by mouth Every 12 (Twelve) Hours.  Dispense: 14 tablet; Refill: 0    Other orders  -     amoxicillin-clavulanate (Augmentin) 875-125 MG per tablet; Take 1 tablet by mouth 2 (Two) Times a Day " for 10 days.  Dispense: 20 tablet; Refill: 0    Discussion:  Advised and educated plan of care. Advised influenza and COVID-19 tests are negative. Antibiotics and Mucinex D to follow. Advised patient to limit dairy for a few days as this will help thin her thick mucoid effusions. Advised increasing fluids. Follow up as needed.     Follow-up:  Return if symptoms worsen or fail to improve.    Transcribed from ambient dictation for EILEEN Thompson by Gaby Morgan.  02/09/23   16:12 CST    I have personally performed the services described in this document as transcribed by the above individual, and it is both accurate and complete.    Electronically signed by Jean Haddad, 02/09/23, 4:12 PM CST.

## 2023-02-16 ENCOUNTER — OFFICE VISIT (OUTPATIENT)
Dept: FAMILY MEDICINE CLINIC | Facility: CLINIC | Age: 63
End: 2023-02-16
Payer: COMMERCIAL

## 2023-02-16 VITALS
BODY MASS INDEX: 34.75 KG/M2 | OXYGEN SATURATION: 98 % | TEMPERATURE: 97.3 F | HEART RATE: 97 BPM | HEIGHT: 65 IN | SYSTOLIC BLOOD PRESSURE: 130 MMHG | RESPIRATION RATE: 18 BRPM | DIASTOLIC BLOOD PRESSURE: 82 MMHG | WEIGHT: 208.6 LBS

## 2023-02-16 DIAGNOSIS — H93.8X3 CONGESTION OF BOTH EARS: Primary | ICD-10-CM

## 2023-02-16 PROCEDURE — 99213 OFFICE O/P EST LOW 20 MIN: CPT | Performed by: NURSE PRACTITIONER

## 2023-02-16 NOTE — PROGRESS NOTES
Subjective   Chief Complaint:  Ear Recheck    History of Present Illness:  This 62 y.o. female was seen in the office today.    The patient is following up today after starting Mucinex D and Augmentin on 02/09/2024. She is also on Zyrtect daily.  She reports her symptoms are the same with no improvement.    Allergies   Allergen Reactions   • Bee Venom Nausea And Vomiting   • Latex Hives     01/18/2005-Latex unspecified     • Levofloxacin Delirium   • Poison Ivy Extract Hives   • Red Dye Itching     06/22/2006-Skin itch     • Tetracycline Other (See Comments)     01/18/2005-Tetracycline intolerant     • Adhesive Tape Rash   • Tegaderm Ag Mesh [Silver] Rash   • Wasp Venom Rash      Current Outpatient Medications on File Prior to Visit   Medication Sig   • amoxicillin-clavulanate (Augmentin) 875-125 MG per tablet Take 1 tablet by mouth 2 (Two) Times a Day for 10 days.   • cetirizine (zyrTEC) 10 MG tablet Take 10 mg by mouth Daily.   • diphenhydrAMINE (BENADRYL) 25 mg capsule Take 25 mg by mouth As Needed.   • hydroCHLOROthiazide (MICROZIDE) 12.5 MG capsule TAKE 1 CAPSULE BY MOUTH DAILY.   • LORazepam (ATIVAN) 1 MG tablet TAKE 1 TABLET BY MOUTH EVERY 8 (EIGHT) HOURS AS NEEDED FOR ANXIETY.   • meclizine (ANTIVERT) 25 MG tablet Take 1 tablet by mouth 3 (Three) Times a Day As Needed for Dizziness.   • melatonin 5 MG tablet tablet Take 10 mg by mouth Every Night.   • multivitamin with minerals tablet tablet Take 1 tablet by mouth Daily.   • pseudoephedrine-guaifenesin (MUCINEX D)  MG per 12 hr tablet Take 1 tablet by mouth Every 12 (Twelve) Hours.     No current facility-administered medications on file prior to visit.      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      Past Surgical History:   Procedure Laterality Date   • ADENOIDECTOMY     • BILATERAL INSERTION OF EAR TUBES AND ADENOIDECTOMY     • COLONOSCOPY N/A 02/18/2022    Procedure: COLONOSCOPY WITH ANESTHESIA;  Surgeon: Bharat Vallejo MD;  Location: [facility] ENDOSCOPY;  Service: Gastroenterology;  Laterality: N/A;  pre screen  post; polyps   Daren Zazueta MD   • LAPAROSCOPIC CHOLECYSTECTOMY     • SKIN LESION EXCISION Left     SCC of the Left upper arm   • TONSILLECTOMY       Social History     Socioeconomic History   • Marital status: Single   Tobacco Use   • Smoking status: Never   • Smokeless tobacco: Never   Substance and Sexual Activity   • Alcohol use: Not Currently   • Drug use: Never   • Sexual activity: Yes     Partners: Male     Family History   Problem Relation Age of Onset   • Lung cancer Mother    • Liver cancer Mother    • Lung cancer Father    • Obesity Brother    • Heart disease Brother    • Arthritis Brother    • No Known Problems Maternal Grandmother    • No Known Problems Paternal Grandmother    • No Known Problems Maternal Aunt    • No Known Problems Paternal Aunt    • No Known Problems Sister    • No Known Problems Daughter    • No Known Problems Son    • No Known Problems Other    • BRCA 1/2 Neg Hx    • Breast cancer Neg Hx    • Colon cancer Neg Hx    • Endometrial cancer Neg Hx    • Ovarian cancer Neg Hx    • Colon polyps Neg Hx    • Esophageal cancer Neg Hx        Objective   Physical Exam  Vitals reviewed.   Constitutional:       General: She is not in acute distress.     Appearance: Normal appearance.   HENT:      Right Ear: A middle ear effusion is present.      Left Ear: A middle ear effusion is present.      Ears:      Comments: Bilateral mucoid effusions  Cardiovascular:      Rate and Rhythm: Normal rate and regular rhythm.   Pulmonary:      Effort: Pulmonary effort is normal.      Breath sounds: Normal breath sounds.     BP  "130/82 (BP Location: Left arm, Patient Position: Sitting, Cuff Size: Adult)   Pulse 97   Temp 97.3 °F (36.3 °C) (Infrared)   Resp 18   Ht 165.1 cm (65\")   Wt 94.6 kg (208 lb 9.6 oz)   SpO2 98%   BMI 34.71 kg/m²     Prior Visit Notes/Records, Lab, Imaging, and Diagnostic Results Reviewed:  CBC:  Lab Results - Last 18 Months   Lab Units 10/10/22  1432 06/28/22  2300 01/20/22  1036 09/24/21  0807   WBC 10*3/mm3 8.43 7.45 8.69 6.3   HEMOGLOBIN g/dL 14.1 14.0 14.9 13.4   HEMATOCRIT % 44.0 43.0 44.8 40.2   PLATELETS 10*3/mm3 380 329 353 329   IRON mcg/dL 68 95  --   --       Chemistry:  Lab Results - Last 18 Months   Lab Units 12/19/22  0719 10/10/22  1432 06/28/22  2300 01/20/22  1036 09/24/21  0807   SODIUM mmol/L  --  141 141 141 142   POTASSIUM mmol/L  --  3.8 4.5 4.7 4.6   CHLORIDE mmol/L  --  105 103 104 107*   TOTAL CO2 mmol/L  --   --  27.2 26.1 22   CO2 mmol/L  --  26.0  --   --   --    GLUCOSE mg/dL  --  95 105* 101* 92   BUN mg/dL  --  15 14 11 17   CREATININE mg/dL  --  0.66 0.82 0.70 0.77   EGFR IF NONAFRICN AM mL/min/1.73  --   --   --  86 85   EGFR IF AFRICN AM mL/min/1.73  --   --   --  104 98   EGFR RESULT mL/min/1.73  --   --  82.5  --   --    CALCIUM mg/dL 9.7 9.5 9.9 10.1 9.5     Lab Results - Last 18 Months   Lab Units 10/10/22  1432 06/28/22  2300 01/20/22  1036 09/24/21  0807   ALT (SGPT) U/L 16 12 16 11   AST (SGOT) U/L 23 16 20 20   ALK PHOS U/L 81 89 96 76       Assessment & Plan   Diagnoses and all orders for this visit:    1. Congestion of both ears (Primary)  -     Ambulatory Referral to ENT (Otolaryngology)    Discussion:  Advised and educated plan of care.  Advised if no improvement, advised ENT consultation as the next step.    Follow-up:  Return if symptoms worsen or fail to improve.    Transcribed from ambient dictation for EILEEN Thompson by Cadence Merlos.  02/16/23   16:54 CST    I have personally performed the services described in this document as transcribed by the above " individual, and it is both accurate and complete.    Electronically signed by EILEEN Alicia 02/16/23, 4:54 PM CST.

## 2023-02-27 ENCOUNTER — OFFICE VISIT (OUTPATIENT)
Dept: FAMILY MEDICINE CLINIC | Facility: CLINIC | Age: 63
End: 2023-02-27
Payer: COMMERCIAL

## 2023-02-27 VITALS — HEIGHT: 65 IN | BODY MASS INDEX: 34.71 KG/M2 | OXYGEN SATURATION: 96 % | HEART RATE: 115 BPM | TEMPERATURE: 99.1 F

## 2023-02-27 DIAGNOSIS — R05.9 COUGH, UNSPECIFIED TYPE: Primary | ICD-10-CM

## 2023-02-27 DIAGNOSIS — J01.90 ACUTE SINUSITIS, RECURRENCE NOT SPECIFIED, UNSPECIFIED LOCATION: ICD-10-CM

## 2023-02-27 DIAGNOSIS — F41.9 ANXIETY: ICD-10-CM

## 2023-02-27 DIAGNOSIS — R09.81 NASAL CONGESTION: ICD-10-CM

## 2023-02-27 LAB
EXPIRATION DATE: NORMAL
INTERNAL CONTROL: NORMAL
Lab: NORMAL
SARS-COV-2 AG UPPER RESP QL IA.RAPID: NOT DETECTED

## 2023-02-27 PROCEDURE — 99213 OFFICE O/P EST LOW 20 MIN: CPT | Performed by: FAMILY MEDICINE

## 2023-02-27 PROCEDURE — 96372 THER/PROPH/DIAG INJ SC/IM: CPT | Performed by: FAMILY MEDICINE

## 2023-02-27 PROCEDURE — 87426 SARSCOV CORONAVIRUS AG IA: CPT | Performed by: FAMILY MEDICINE

## 2023-02-27 RX ORDER — LORAZEPAM 1 MG/1
1 TABLET ORAL EVERY 8 HOURS PRN
Qty: 90 TABLET | Refills: 0 | Status: SHIPPED | OUTPATIENT
Start: 2023-02-27 | End: 2023-03-30

## 2023-02-27 RX ORDER — AMOXICILLIN AND CLAVULANATE POTASSIUM 875; 125 MG/1; MG/1
1 TABLET, FILM COATED ORAL 2 TIMES DAILY
Qty: 20 TABLET | Refills: 0 | Status: SHIPPED | OUTPATIENT
Start: 2023-02-27

## 2023-02-27 RX ORDER — METHYLPREDNISOLONE 4 MG/1
TABLET ORAL
Qty: 21 TABLET | Refills: 0 | Status: SHIPPED | OUTPATIENT
Start: 2023-02-27 | End: 2023-03-07

## 2023-02-27 RX ORDER — DEXAMETHASONE SODIUM PHOSPHATE 4 MG/ML
4 INJECTION, SOLUTION INTRA-ARTICULAR; INTRALESIONAL; INTRAMUSCULAR; INTRAVENOUS; SOFT TISSUE ONCE
Status: COMPLETED | OUTPATIENT
Start: 2023-02-27 | End: 2023-02-27

## 2023-02-27 RX ORDER — CEFTRIAXONE 1 G/1
500 INJECTION, POWDER, FOR SOLUTION INTRAMUSCULAR; INTRAVENOUS ONCE
Status: COMPLETED | OUTPATIENT
Start: 2023-02-27 | End: 2023-02-27

## 2023-02-27 RX ADMIN — DEXAMETHASONE SODIUM PHOSPHATE 4 MG: 4 INJECTION, SOLUTION INTRA-ARTICULAR; INTRALESIONAL; INTRAMUSCULAR; INTRAVENOUS; SOFT TISSUE at 16:10

## 2023-02-27 RX ADMIN — CEFTRIAXONE 500 MG: 1 INJECTION, POWDER, FOR SOLUTION INTRAMUSCULAR; INTRAVENOUS at 16:10

## 2023-02-27 NOTE — PROGRESS NOTES
Subjective   Radha Wolff is a 62 y.o. female.     Chief Complaint   Patient presents with   • Cough   • Nasal Congestion   • Generalized Body Aches       History of Present Illness     she is noting sinus congestion and cough ---denies any sob but has low grade temp      Current Outpatient Medications:   •  cetirizine (zyrTEC) 10 MG tablet, Take 10 mg by mouth Daily., Disp: , Rfl:   •  diphenhydrAMINE (BENADRYL) 25 mg capsule, Take 25 mg by mouth As Needed., Disp: , Rfl:   •  hydroCHLOROthiazide (MICROZIDE) 12.5 MG capsule, TAKE 1 CAPSULE BY MOUTH DAILY., Disp: 90 capsule, Rfl: 1  •  LORazepam (ATIVAN) 1 MG tablet, TAKE 1 TABLET BY MOUTH EVERY 8 (EIGHT) HOURS AS NEEDED FOR ANXIETY., Disp: 90 tablet, Rfl: 0  •  meclizine (ANTIVERT) 25 MG tablet, Take 1 tablet by mouth 3 (Three) Times a Day As Needed for Dizziness., Disp: 60 tablet, Rfl: 0  •  melatonin 5 MG tablet tablet, Take 10 mg by mouth Every Night., Disp: , Rfl:   •  multivitamin with minerals tablet tablet, Take 1 tablet by mouth Daily., Disp: , Rfl:   •  pseudoephedrine-guaifenesin (MUCINEX D)  MG per 12 hr tablet, Take 1 tablet by mouth Every 12 (Twelve) Hours., Disp: 14 tablet, Rfl: 0  •  amoxicillin-clavulanate (Augmentin) 875-125 MG per tablet, Take 1 tablet by mouth 2 (Two) Times a Day., Disp: 20 tablet, Rfl: 0  •  methylPREDNISolone (MEDROL) 4 MG dose pack, Take as directed on package instructions., Disp: 21 tablet, Rfl: 0    Current Facility-Administered Medications:   •  cefTRIAXone (ROCEPHIN) injection 500 mg, 500 mg, Intramuscular, Once, Daren Zazueta MD  •  dexamethasone (DECADRON) injection 4 mg, 4 mg, Intramuscular, Once, Daren Zazueta MD  Allergies   Allergen Reactions   • Bee Venom Nausea And Vomiting   • Latex Hives     01/18/2005-Latex unspecified     • Levofloxacin Delirium   • Poison Ivy Extract Hives   • Red Dye Itching     06/22/2006-Skin itch     • Tetracycline Other (See Comments)      "01/18/2005-Tetracycline intolerant     • Adhesive Tape Rash   • Tegaderm Ag Mesh [Silver] Rash   • Wasp Venom Rash       BMI is >= 30 and <35. (Class 1 Obesity). The following options were offered after discussion;: nutrition counseling/recommendations      Past Medical History:   Diagnosis Date   • Anxiety    • Arthritis    • Dizzy spells    • Elevated cholesterol    • Hematuria    • Hypertension    • Osteoarthritis    • Pancreatitis     2007   • PMB (postmenopausal bleeding)    • 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      Past Surgical History:   Procedure Laterality Date   • ADENOIDECTOMY     • BILATERAL INSERTION OF EAR TUBES AND ADENOIDECTOMY     • COLONOSCOPY N/A 02/18/2022    Procedure: COLONOSCOPY WITH ANESTHESIA;  Surgeon: Bharat Vallejo MD;  Location: [facility] ENDOSCOPY;  Service: Gastroenterology;  Laterality: N/A;  pre screen  post; polyps   Daren Zazueta MD   • LAPAROSCOPIC CHOLECYSTECTOMY     • SKIN LESION EXCISION Left     SCC of the Left upper arm   • TONSILLECTOMY         Review of Systems   Constitutional: Negative.    HENT: Positive for congestion, ear pain, postnasal drip, rhinorrhea, sinus pressure and sinus pain.    Eyes: Negative.    Respiratory: Positive for cough.    Cardiovascular: Negative.    Gastrointestinal: Negative.    Endocrine: Negative.    Genitourinary: Negative.    Musculoskeletal: Negative.    Skin: Negative.    Allergic/Immunologic: Negative.    Neurological: Negative.    Hematological: Negative.    Psychiatric/Behavioral: Negative.        Objective  Pulse 115   Temp 99.1 °F (37.3 °C)   Ht 165.1 cm (65\")   SpO2 96%   BMI 34.71 kg/m²   Physical Exam  Vitals and nursing note reviewed.   Constitutional:       Appearance: Normal appearance. She is normal weight.   HENT:      Head: Normocephalic and atraumatic.      Nose: Nose normal.      Mouth/Throat:      Mouth: Mucous membranes are moist.   Eyes:      Extraocular Movements: Extraocular " movements intact.      Pupils: Pupils are equal, round, and reactive to light.   Cardiovascular:      Rate and Rhythm: Normal rate and regular rhythm.      Pulses: Normal pulses.      Heart sounds: Normal heart sounds.   Pulmonary:      Effort: Pulmonary effort is normal.      Breath sounds: Normal breath sounds.   Abdominal:      General: Abdomen is flat. Bowel sounds are normal.      Palpations: Abdomen is soft.   Musculoskeletal:         General: Normal range of motion.      Cervical back: Normal range of motion and neck supple.   Skin:     General: Skin is warm and dry.      Capillary Refill: Capillary refill takes less than 2 seconds.   Neurological:      General: No focal deficit present.      Mental Status: She is alert and oriented to person, place, and time. Mental status is at baseline.   Psychiatric:         Mood and Affect: Mood normal.         Assessment & Plan   Diagnoses and all orders for this visit:    1. Cough, unspecified type (Primary)  -     POCT VERITOR SARS-CoV-2 Antigen  -     XR Chest PA & Lateral  -     dexamethasone (DECADRON) injection 4 mg  -     cefTRIAXone (ROCEPHIN) injection 500 mg    2. Nasal congestion  -     POCT VERITOR SARS-CoV-2 Antigen    3. Acute sinusitis, recurrence not specified, unspecified location  -     dexamethasone (DECADRON) injection 4 mg  -     cefTRIAXone (ROCEPHIN) injection 500 mg    Other orders  -     amoxicillin-clavulanate (Augmentin) 875-125 MG per tablet; Take 1 tablet by mouth 2 (Two) Times a Day.  Dispense: 20 tablet; Refill: 0  -     methylPREDNISolone (MEDROL) 4 MG dose pack; Take as directed on package instructions.  Dispense: 21 tablet; Refill: 0    keep me informd              Orders Placed This Encounter   Procedures   • XR Chest PA & Lateral     Order Specific Question:   Reason for Exam:     Answer:   cough     Order Specific Question:   Does this patient have a diabetic monitoring/medication delivering device on?     Answer:   No     Order  Specific Question:   Release to patient     Answer:   Routine Release   • POCT VERITOR SARS-CoV-2 Antigen     Order Specific Question:   Release to patient     Answer:   Routine Release       Follow up: 2 month(s)

## 2023-02-28 ENCOUNTER — TELEPHONE (OUTPATIENT)
Dept: FAMILY MEDICINE CLINIC | Facility: CLINIC | Age: 63
End: 2023-02-28

## 2023-02-28 ENCOUNTER — HOSPITAL ENCOUNTER (OUTPATIENT)
Dept: GENERAL RADIOLOGY | Facility: HOSPITAL | Age: 63
Discharge: HOME | End: 2023-02-28
Admitting: FAMILY MEDICINE
Payer: COMMERCIAL

## 2023-02-28 PROCEDURE — 71046 X-RAY EXAM CHEST 2 VIEWS: CPT

## 2023-02-28 NOTE — TELEPHONE ENCOUNTER
Caller: Radha Wolff    Relationship to patient: Self    Best call back number: [phone]    Patient is needing: CALL BACK FROM [facility]

## 2023-02-28 NOTE — TELEPHONE ENCOUNTER
Caller: Mariella Radha Jeannie    Relationship: Self    Best call back number: [phone]    What is the best time to reach you: ANYTIME    Who are you requesting to speak with (clinical staff, provider,  specific staff member): CLINICAL      What was the call regarding: PATIENT STATES SHE COMPLETED HER CHEST XRAY TODAY. PATIENT REQUESTING CALL BACK WITH RESULTS ASAP.    Do you require a callback: YES

## 2023-03-01 ENCOUNTER — TREATMENT (OUTPATIENT)
Dept: PHYSICAL THERAPY | Facility: CLINIC | Age: 63
End: 2023-03-01
Payer: COMMERCIAL

## 2023-03-01 DIAGNOSIS — I89.0 LYMPHEDEMA OF LEFT LOWER EXTREMITY: Primary | ICD-10-CM

## 2023-03-01 DIAGNOSIS — R60.9 LIPEDEMA: ICD-10-CM

## 2023-03-01 PROCEDURE — 97140 MANUAL THERAPY 1/> REGIONS: CPT | Performed by: PHYSICAL THERAPIST

## 2023-03-01 PROCEDURE — 97535 SELF CARE MNGMENT TRAINING: CPT | Performed by: PHYSICAL THERAPIST

## 2023-03-01 NOTE — PROGRESS NOTES
Physical Therapy Treatment Note and 90 Day Recertification Note  [address] [place] [address]    Patient: Radha Wolff                                                 Visit Date: 3/1/2023  :     1960    Referring practitioner:    EILEEN Woody  Date of Initial Visit:          Type: THERAPY  Noted: 2022    Patient seen for 8 sessions    Visit Diagnoses:    ICD-10-CM ICD-9-CM   1. Lymphedema of left lower extremity  I89.0 457.1   2. Lipedema  R60.9 782.3     SUBJECTIVE     Subjective  She brought the CD of her chest X-ray.   She is going to ask for a referral to a pulmonologist.    PAIN: 0/10         OBJECTIVE     Objective    Lymphedema     Row Name 23 1600             Subjective Pain    Able to rate subjective pain? yes  -HR      Pre-Treatment Pain Level 0  -HR         Manual Lymphatic Drainage    Manual Lymphatic Drainage initial sequence;opened regional lymph nodes;opened anastamoses;extremity treatment  -HR      Initial Sequence short neck;abdomen;diaphragmatic breathing  -HR      Abdomen superficial  -HR      Diaphragmatic Breathing x 9 with superficial abdominals  -HR      Opened Regional Lymph Nodes axillary;inguinal  -HR      Axillary right;left  -HR      Inguinal right;left  -HR      Opened Anastamoses inguino-axillary  -HR      Inguino-Axillary right;left  -HR      Extremity Treatment MLD to full limb  -HR      MLD to Full Limb BLE  -HR      Manual Therapy 40  -HR         Compression/Skin Care    Compression/Skin Care Comments Has her compression leggings on  -HR            User Key  (r) = Recorded By, (t) = Taken By, (c) = Cosigned By    Initials Name Provider Type    HR Yojana Carrasco, PT, DPT, CLT-MARY Physical Therapist                     Therapy Education/Self Care [id number]   Education offered today First 20 minutes of session was spent looking at her chest X-ray and answering her multiple  questions to the best of my ability.    Medbridge Code    Ongoing HEP   Work on CDT   Timed Minutes 20       Total Timed Treatment:     60   mins  Total Time of Visit:             60   mins         ASSESSMENT/PLAN     GOALS        Goals                                          Progress Note due by 3/31/23                                                      Recert due by 5/31/23   STG by: 4 weeks Comments Date Status   Patient will have a good basic understanding of lymphedema and its suggested risk reduction practices  Continue to educated during treatment. 3/1 Ongoing   Patient will be independent with remedial HEP for lymphedema She is working on the HEP. 3/1 Ongoing   Patient will be independent with self MLD for lymphedema She is working on the MLD  3/1 Ongoing             LTG by: 12 weeks         Patient will have appropriate compression garments for lymphedema maintenance She has good fitting compression leggings 1/19 Met   Patient will have no sign or symptoms of infection No s/s of infection 1/19 Met   Patient will be independent with comprehensive maintenance program for lymphedema She will think about the Flexitouch pump. 3/1 Ongoing                                     Assessment & Plan     Assessment  Impairments: lacks appropriate home exercise program  Prognosis: good    Plan  Therapy options: will be seen for skilled therapy services  Planned modality interventions: low level laser therapy  Planned therapy interventions: manual therapy, therapeutic activities, home exercise program and compression  Frequency: 2x week  Duration in weeks: 12  Treatment plan discussed with: patient         ASSESSMENT:  Patient is very concerned about the calcifications noted in her chest X-ray which involve lymph nodes.   She has dense tissue located at the upper legs and medial knees.  She continues to wear the compression daily. She has been unable to come in when we have called her from the wait list and since our  schedule has been so full, it has been a month since she was seen. We need to try to get her treated with some consistency and make sure she has all the tools she needs for home maintenance.     PLAN: Cont CDT for BLE, will see patient 2 x a week x 12 weeks.     SIGNATURE: Yojana Carrasco, PT, DPT, [facility] License #: [id number]  Electronically Signed on 3/1/2023        [address] [place] [address] [place] [address]  [phone]

## 2023-03-07 ENCOUNTER — OFFICE VISIT (OUTPATIENT)
Dept: FAMILY MEDICINE CLINIC | Facility: CLINIC | Age: 63
End: 2023-03-07
Payer: COMMERCIAL

## 2023-03-07 VITALS
BODY MASS INDEX: 34.49 KG/M2 | HEART RATE: 75 BPM | HEIGHT: 65 IN | DIASTOLIC BLOOD PRESSURE: 78 MMHG | RESPIRATION RATE: 16 BRPM | OXYGEN SATURATION: 97 % | WEIGHT: 207 LBS | SYSTOLIC BLOOD PRESSURE: 132 MMHG

## 2023-03-07 DIAGNOSIS — R93.89 ABNORMAL CXR: ICD-10-CM

## 2023-03-07 DIAGNOSIS — J31.0 CHRONIC RHINITIS: Primary | ICD-10-CM

## 2023-03-07 PROCEDURE — 99213 OFFICE O/P EST LOW 20 MIN: CPT | Performed by: FAMILY MEDICINE

## 2023-03-07 RX ORDER — IPRATROPIUM BROMIDE 42 UG/1
2 SPRAY, METERED NASAL 4 TIMES DAILY
Qty: 15 ML | Refills: 12 | Status: SHIPPED | OUTPATIENT
Start: 2023-03-07

## 2023-03-07 NOTE — PROGRESS NOTES
Subjective   Radha Wolff is a 62 y.o. female.     Chief Complaint   Patient presents with   • Follow-up     cxr       History of Present Illness     she notes persistent rhinitis---but overall better--she is very anxious about her cxr as her father had cancer      Current Outpatient Medications:   •  amoxicillin-clavulanate (Augmentin) 875-125 MG per tablet, Take 1 tablet by mouth 2 (Two) Times a Day., Disp: 20 tablet, Rfl: 0  •  cetirizine (zyrTEC) 10 MG tablet, Take 1 tablet by mouth Daily., Disp: , Rfl:   •  diphenhydrAMINE (BENADRYL) 25 mg capsule, Take 1 capsule by mouth As Needed., Disp: , Rfl:   •  hydroCHLOROthiazide (MICROZIDE) 12.5 MG capsule, TAKE 1 CAPSULE BY MOUTH DAILY., Disp: 90 capsule, Rfl: 1  •  LORazepam (ATIVAN) 1 MG tablet, Take 1 tablet by mouth Every 8 (Eight) Hours As Needed for Anxiety., Disp: 90 tablet, Rfl: 0  •  meclizine (ANTIVERT) 25 MG tablet, Take 1 tablet by mouth 3 (Three) Times a Day As Needed for Dizziness., Disp: 60 tablet, Rfl: 0  •  melatonin 5 MG tablet tablet, Take 2 tablets by mouth Every Night., Disp: , Rfl:   •  multivitamin with minerals tablet tablet, Take 1 tablet by mouth Daily., Disp: , Rfl:   •  pseudoephedrine-guaifenesin (MUCINEX D)  MG per 12 hr tablet, Take 1 tablet by mouth Every 12 (Twelve) Hours., Disp: 14 tablet, Rfl: 0  •  ipratropium (ATROVENT) 0.06 % nasal spray, 2 sprays into the nostril(s) as directed by provider 4 (Four) Times a Day., Disp: 15 mL, Rfl: 12  Allergies   Allergen Reactions   • Bee Venom Nausea And Vomiting   • Latex Hives     01/18/2005-Latex unspecified     • Levofloxacin Delirium   • Poison Ivy Extract Hives   • Red Dye Itching     06/22/2006-Skin itch     • Tetracycline Other (See Comments)     01/18/2005-Tetracycline intolerant     • Adhesive Tape Rash   • Tegaderm Ag Mesh [Silver] Rash   • Wasp Venom Rash       BMI is >= 30 and <35. (Class 1 Obesity). The following options were offered after discussion;: nutrition  "counseling/recommendations      Past Medical History:   Diagnosis Date   • Anxiety    • Arthritis    • Dizzy spells    • Elevated cholesterol    • Hematuria    • Hypertension    • Osteoarthritis    • Pancreatitis     2007   • PMB (postmenopausal bleeding)    • 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      Past Surgical History:   Procedure Laterality Date   • ADENOIDECTOMY     • BILATERAL INSERTION OF EAR TUBES AND ADENOIDECTOMY     • COLONOSCOPY N/A 02/18/2022    Procedure: COLONOSCOPY WITH ANESTHESIA;  Surgeon: Bharat Vallejo MD;  Location: [facility] ENDOSCOPY;  Service: Gastroenterology;  Laterality: N/A;  pre screen  post; polyps   Daren Zazueta MD   • LAPAROSCOPIC CHOLECYSTECTOMY     • SKIN LESION EXCISION Left     SCC of the Left upper arm   • TONSILLECTOMY         Review of Systems   Constitutional: Negative.  Negative for chills and fever.   HENT: Positive for ear pain, postnasal drip and rhinorrhea. Negative for sore throat.    Eyes: Negative.    Respiratory: Positive for cough and wheezing. Negative for shortness of breath.    Cardiovascular: Negative.  Negative for chest pain.   Gastrointestinal: Negative.    Endocrine: Negative.    Genitourinary: Negative.    Musculoskeletal: Negative.  Negative for myalgias.   Skin: Negative for rash.   Allergic/Immunologic: Negative.    Neurological: Negative.  Negative for headaches.   Hematological: Negative.    Psychiatric/Behavioral: Negative.        Objective  /78   Pulse 75   Resp 16   Ht 165.1 cm (65\")   Wt 93.9 kg (207 lb)   SpO2 97%   BMI 34.45 kg/m²   Physical Exam  Vitals and nursing note reviewed.   Constitutional:       Appearance: Normal appearance. She is normal weight.   HENT:      Head: Normocephalic and atraumatic.      Right Ear: Ear canal normal.      Left Ear: Ear canal normal.      Nose: Congestion and rhinorrhea present.      Mouth/Throat:      Mouth: Mucous membranes are moist.      Pharynx: " Oropharynx is clear.   Eyes:      Extraocular Movements: Extraocular movements intact.      Conjunctiva/sclera: Conjunctivae normal.      Pupils: Pupils are equal, round, and reactive to light.   Cardiovascular:      Rate and Rhythm: Normal rate and regular rhythm.      Pulses: Normal pulses.      Heart sounds: Normal heart sounds.   Abdominal:      General: Abdomen is flat.      Palpations: Abdomen is soft.   Musculoskeletal:      Cervical back: Normal range of motion and neck supple.   Skin:     Capillary Refill: Capillary refill takes less than 2 seconds.   Neurological:      General: No focal deficit present.      Mental Status: She is alert and oriented to person, place, and time. Mental status is at baseline.   Psychiatric:         Mood and Affect: Mood normal.         Assessment & Plan   Diagnoses and all orders for this visit:    1. Chronic rhinitis (Primary)    2. Abnormal CXR  -     CT Chest Without Contrast    Other orders  -     ipratropium (ATROVENT) 0.06 % nasal spray; 2 sprays into the nostril(s) as directed by provider 4 (Four) Times a Day.  Dispense: 15 mL; Refill: 12                 Orders Placed This Encounter   Procedures   • CT Chest Without Contrast     Order Specific Question:   Release to patient     Answer:   Routine Release       Follow up: 6 week(s)

## 2023-03-13 ENCOUNTER — TREATMENT (OUTPATIENT)
Dept: PHYSICAL THERAPY | Facility: CLINIC | Age: 63
End: 2023-03-13
Payer: COMMERCIAL

## 2023-03-13 DIAGNOSIS — I89.0 LYMPHEDEMA OF LEFT LOWER EXTREMITY: Primary | ICD-10-CM

## 2023-03-13 DIAGNOSIS — R60.9 LIPEDEMA: ICD-10-CM

## 2023-03-13 PROCEDURE — 97140 MANUAL THERAPY 1/> REGIONS: CPT | Performed by: PHYSICAL THERAPIST

## 2023-03-13 NOTE — PROGRESS NOTES
Physical Therapy Treatment Note  [address] [place] [address]    Patient: Radha Wolff                                                 Visit Date: 3/13/2023  :     1960    Referring practitioner:    EILEEN Woody  Date of Initial Visit:          Type: THERAPY  Noted: 2022    Patient seen for 9 sessions    Visit Diagnoses:    ICD-10-CM ICD-9-CM   1. Lymphedema of left lower extremity  I89.0 457.1   2. Lipedema  R60.9 782.3     SUBJECTIVE     Subjective  She is cleared up now, she sees Dr. Hatch and has a CAT scan on the .  Dr. Zazueta thinks she may have had Histoplasmosis. She thinks she may have a UTI, she is waiting for Dr. Zazueta to call her back.     PAIN: 0/10         OBJECTIVE     Objective    Lymphedema     Row Name 23 1530             Subjective Pain    Able to rate subjective pain? yes  -AL      Pre-Treatment Pain Level 0  -AL         Manual Lymphatic Drainage    Manual Lymphatic Drainage initial sequence;opened regional lymph nodes;opened anastamoses;extremity treatment  -AL      Initial Sequence short neck;abdomen;diaphragmatic breathing  -AL      Abdomen superficial  -AL      Diaphragmatic Breathing x 9 with superficial abdominals  -AL      Opened Regional Lymph Nodes axillary;inguinal  -AL      Axillary right;left  -AL      Inguinal right;left  -AL      Opened Anastamoses inguino-axillary  -AL      Inguino-Axillary right;left  -AL      Extremity Treatment MLD to full limb  -AL      MLD to Full Limb BLE  -AL      Manual Therapy 55  -AL         Compression/Skin Care    Compression/Skin Care Comments Continue to wear compression leggings  -AL            User Key  (r) = Recorded By, (t) = Taken By, (c) = Cosigned By    Initials Name Provider Type    AL Jenna Schultz, PTA, [facility] Physical Therapist Assistant                     Therapy Education/Self Care [id number]   Education offered today Work  on CDT   Medbridge Code    Ongoing HEP   Work on CDT   Timed Minutes        Total Timed Treatment:     55   mins  Total Time of Visit:             55   mins         ASSESSMENT/PLAN     GOALS        Goals                                          Progress Note due by 3/31/23                                                      Recert due by 5/31/23   STG by: 4 weeks Comments Date Status   Patient will have a good basic understanding of lymphedema and its suggested risk reduction practices  Continue to educated during treatment. 3/1 Ongoing   Patient will be independent with remedial HEP for lymphedema She is working on the HEP. 3/1 Ongoing   Patient will be independent with self MLD for lymphedema She is working on the MLD  3/13 Met             LTG by: 12 weeks         Patient will have appropriate compression garments for lymphedema maintenance She has good fitting compression leggings 1/19 Met   Patient will have no sign or symptoms of infection No s/s of infection 1/19 Met   Patient will be independent with comprehensive maintenance program for lymphedema She will think about the Flexitouch pump. 3/1 Ongoing                                     Assessment & Plan            ASSESSMENT:  Patient will see her DrMary Ann And have a CAT scan soon.  She is worried about the recent health issues she has been having.  She was very relaxed after treatment.    PLAN: Cont CDT for BLE, will see patient 2 x a week x 12 weeks.     SIGNATURE: Jenna Schultz PTA, [facility], [place] License #: [id number]  Electronically Signed on 3/13/2023        [address] [place] [address]. [address]  [phone]

## 2023-03-14 ENCOUNTER — TELEPHONE (OUTPATIENT)
Dept: FAMILY MEDICINE CLINIC | Facility: CLINIC | Age: 63
End: 2023-03-14
Payer: COMMERCIAL

## 2023-03-14 RX ORDER — FLUCONAZOLE 100 MG/1
100 TABLET ORAL DAILY
Qty: 3 TABLET | Refills: 0 | Status: SHIPPED | OUTPATIENT
Start: 2023-03-14

## 2023-03-14 NOTE — TELEPHONE ENCOUNTER
Patient called and stated yesterday she had some vaginal itching with no discharge. She said she had some irritation that felt like a diaper rash from her vagina to her anus. She stated today she has milky discharge. Please advise     MD2 for medication

## 2023-03-15 ENCOUNTER — TREATMENT (OUTPATIENT)
Dept: PHYSICAL THERAPY | Facility: CLINIC | Age: 63
End: 2023-03-15
Payer: COMMERCIAL

## 2023-03-15 DIAGNOSIS — I89.0 LYMPHEDEMA OF LEFT LOWER EXTREMITY: Primary | ICD-10-CM

## 2023-03-15 DIAGNOSIS — R60.9 LIPEDEMA: ICD-10-CM

## 2023-03-15 PROCEDURE — 97140 MANUAL THERAPY 1/> REGIONS: CPT | Performed by: PHYSICAL THERAPIST

## 2023-03-21 ENCOUNTER — TREATMENT (OUTPATIENT)
Dept: PHYSICAL THERAPY | Facility: CLINIC | Age: 63
End: 2023-03-21
Payer: COMMERCIAL

## 2023-03-21 DIAGNOSIS — I89.0 LYMPHEDEMA OF LEFT LOWER EXTREMITY: Primary | ICD-10-CM

## 2023-03-21 DIAGNOSIS — R60.9 LIPEDEMA: ICD-10-CM

## 2023-03-21 PROCEDURE — 97140 MANUAL THERAPY 1/> REGIONS: CPT | Performed by: PHYSICAL THERAPIST

## 2023-03-21 NOTE — PROGRESS NOTES
Physical Therapy Treatment Note  [address] [place] [address]    Patient: Radha Wolff                                                 Visit Date: 3/21/2023  :     1960    Referring practitioner:    EILEEN Woody  Date of Initial Visit:          Type: THERAPY  Noted: 2022    Patient seen for 11 sessions    Visit Diagnoses:    ICD-10-CM ICD-9-CM   1. Lymphedema of left lower extremity  I89.0 457.1   2. Lipedema  R60.9 782.3     SUBJECTIVE     Subjective  She tried not to go outside over the weekend because she doesn't want to get stopped up all over again.     PAIN: 0/10         OBJECTIVE     Objective    Lymphedema     Row Name 23 1500             Subjective Pain    Able to rate subjective pain? yes  -HR      Pre-Treatment Pain Level 0  -HR         Lymphedema Measurements    Measurement Type(s) Circumferential  -HR      Circumferential Areas Lower extremities  -HR         BLE Circumferential (cm)    Measurement Location 1 BOT  -HR      Left 1 20.5 cm  -HR      Right 1 21 cm  -HR      Measurement Location 2 +10  -HR      Left 2 27.4 cm  -HR      Right 2 25.6 cm  -HR      Measurement Location 3 +10  -HR      Left 3 34.3 cm  -HR      Right 3 34 cm  -HR      Measurement Location 4 +10  -HR      Left 4 43.1 cm  -HR      Right 4 43.8 cm  -HR      Measurement Location 5 +10  -HR      Left 5 46.6 cm  -HR      Right 5 45.5 cm  -HR      Measurement Location 6 +10  -HR      Left 6 44.5 cm  -HR      Right 6 47.8 cm  -HR      Measurement Location 7 +10  -HR      Left 7 57.5 cm  -HR      Right 7 60 cm  -HR      Measurement Location 8 +10  -HR      Left 8 65.3 cm  -HR      Right 8 68.2 cm  -HR      LLE Circumferential Total 339.2 cm  -HR      RLE Circumferential Total 345.9 cm  -HR         Manual Lymphatic Drainage    Manual Lymphatic Drainage initial sequence;opened regional lymph nodes;opened anastamoses;extremity treatment   -HR      Initial Sequence short neck;abdomen;diaphragmatic breathing  -HR      Abdomen superficial  -HR      Opened Regional Lymph Nodes axillary;inguinal  -HR      Axillary right;left  -HR      Inguinal right;left  -HR      Opened Anastamoses inguino-axillary  -HR      Inguino-Axillary right;left  -HR      Extremity Treatment MLD to full limb  -HR      MLD to Full Limb BLE  -HR      Manual Therapy 55  -HR         Compression/Skin Care    Compression/Skin Care Comments she continues to wear the compression leggings.  -HR            User Key  (r) = Recorded By, (t) = Taken By, (c) = Cosigned By    Initials Name Provider Type    HR Yojana Carrasco, PT, DPT, CLT-[facility] Physical Therapist                     Therapy Education/Self Care [id number]   Education offered today Work on CDT   Medbridge Code    Ongoing HEP   Work on CDT   Timed Minutes        Total Timed Treatment:     55   mins  Total Time of Visit:             55   mins         ASSESSMENT/PLAN     GOALS        Goals                                          Progress Note due by 3/31/23                                                      Recert due by 5/31/23   STG by: 4 weeks Comments Date Status   Patient will have a good basic understanding of lymphedema and its suggested risk reduction practices  Continue to educated during treatment. 3/1 Ongoing   Patient will be independent with remedial HEP for lymphedema She is working on the HEP. 3/1 Ongoing   Patient will be independent with self MLD for lymphedema She is working on the MLD  3/13 Met             LTG by: 12 weeks         Patient will have appropriate compression garments for lymphedema maintenance She has good fitting compression leggings 1/19 Met   Patient will have no sign or symptoms of infection No s/s of infection 1/19 Met   Patient will be independent with comprehensive maintenance program for lymphedema She will think about the Flexitouch pump. 3/1 Ongoing                                      Assessment/Plan     ASSESSMENT:  Patient had a reduction in the LLE today but not the R. She doesn't feel like her body excretes extra fluid like it should.     PLAN: Cont CDT for BLE    SIGNATURE: Yojana Carrasco, PT, DPT, CLT[facility] License #: [id number]  Electronically Signed on 3/21/2023        [address] [place] [address]. [address]  [phone]

## 2023-03-24 ENCOUNTER — TREATMENT (OUTPATIENT)
Dept: PHYSICAL THERAPY | Facility: CLINIC | Age: 63
End: 2023-03-24
Payer: COMMERCIAL

## 2023-03-24 DIAGNOSIS — R60.9 LIPEDEMA: ICD-10-CM

## 2023-03-24 DIAGNOSIS — I89.0 LYMPHEDEMA OF LEFT LOWER EXTREMITY: Primary | ICD-10-CM

## 2023-03-24 PROCEDURE — 97140 MANUAL THERAPY 1/> REGIONS: CPT | Performed by: PHYSICAL THERAPIST

## 2023-03-24 NOTE — PROGRESS NOTES
Physical Therapy Treatment Note  [address] [place] [address]    Patient: Radha Wolff                                                 Visit Date: 3/24/2023  :     1960    Referring practitioner:    EILEEN Woody  Date of Initial Visit:          Type: THERAPY  Noted: 2022    Patient seen for 12 sessions    Visit Diagnoses:    ICD-10-CM ICD-9-CM   1. Lymphedema of left lower extremity  I89.0 457.1   2. Lipedema  R60.9 782.3     SUBJECTIVE     Subjective  She had a tiring day at school today.     PAIN: 0/10         OBJECTIVE     Objective    Lymphedema     Row Name 23 1600             Subjective Pain    Able to rate subjective pain? yes  -HR      Pre-Treatment Pain Level 0  -HR         Manual Lymphatic Drainage    Manual Lymphatic Drainage initial sequence;opened regional lymph nodes;opened anastamoses;extremity treatment  -HR      Initial Sequence short neck;abdomen;diaphragmatic breathing  -HR      Abdomen superficial  -HR      Opened Regional Lymph Nodes axillary;inguinal  -HR      Axillary right;left  -HR      Inguinal right;left  -HR      Opened Anastamoses inguino-axillary  -HR      Inguino-Axillary right;left  -HR      Extremity Treatment MLD to full limb  -HR      MLD to Full Limb BLE  -HR      Manual Lymphatic Drainage Comments IASTM to BLE with red ratchet roller- medial and outer thighs  -HR      Manual Therapy 60  -HR            User Key  (r) = Recorded By, (t) = Taken By, (c) = Cosigned By    Initials Name Provider Type    HR Yojana Carrasco, PT, DPT, CLT-MARY Physical Therapist                     Therapy Education/Self Care [id number]   Education offered today Work on CDT   Medbridge Code    Ongoing HEP   Work on CDT   Timed Minutes        Total Timed Treatment:     60   mins  Total Time of Visit:             60   mins         ASSESSMENT/PLAN     GOALS        Goals                                           Progress Note due by 3/31/23                                                      Recert due by 5/31/23   STG by: 4 weeks Comments Date Status   Patient will have a good basic understanding of lymphedema and its suggested risk reduction practices  Continue to educated during treatment. 3/1 Ongoing   Patient will be independent with remedial HEP for lymphedema She is working on the HEP. 3/1 Ongoing   Patient will be independent with self MLD for lymphedema She is working on the MLD  3/13 Met             LTG by: 12 weeks         Patient will have appropriate compression garments for lymphedema maintenance She has good fitting compression leggings 1/19 Met   Patient will have no sign or symptoms of infection No s/s of infection 1/19 Met   Patient will be independent with comprehensive maintenance program for lymphedema She will think about the Flexitouch pump. 3/1 Ongoing                                     Assessment/Plan     ASSESSMENT:  Patient feels like the IASTM helps. She hasn't ordered one for home yet.     PLAN: Cont CDT for BLE    SIGNATURE: Yojana Carrasco, PT, DPT, [facility] License #: [id number]  Electronically Signed on 3/24/2023        [address] [place] [address] [place]. [address]  [phone]     Plastic Surgeon Procedure Text (A): After obtaining clear surgical margins the patient was sent to plastics for surgical repair.  The patient understands they will receive post-surgical care and follow-up from the plastic surgeon.

## 2023-03-24 NOTE — PROGRESS NOTES
Physical Therapy Treatment Note  [address] [place] [address]    Patient: Radha Wolff                                                 Visit Date: 3/15/2023  :     1960    Referring practitioner:    EILEEN Woody  Date of Initial Visit:          Type: THERAPY  Noted: 2022    Patient seen for 10 sessions    Visit Diagnoses:    ICD-10-CM ICD-9-CM   1. Lymphedema of left lower extremity  I89.0 457.1   2. Lipedema  R60.9 782.3     SUBJECTIVE     Subjective  She is cleared up now.    PAIN: 0/10         OBJECTIVE     Objective       03/15/23 1600   Subjective Pain   Able to rate subjective pain? yes   Pre-Treatment Pain Level 0   Lymphedema Measurements   Measurement Type(s) Circumferential   Circumferential Areas Lower extremities   BLE Circumferential (cm)   Measurement Location 1 BOT   Left 1 22 cm   Right 1 21.5 cm   Measurement Location 2 +10   Left 2 27.9 cm   Right 2 26.2 cm   Measurement Location 3 +10   Left 3 34.8 cm   Right 3 32.7 cm   Measurement Location 4 +10   Left 4 42.7 cm   Right 4 43.1 cm   Measurement Location 5 +10   Left 5 46.5 cm   Right 5 46 cm   Measurement Location 6 +10   Left 6 44.5 cm   Right 6 48.9 cm   Measurement Location 7 +10   Left 7 58.5 cm   Right 7 59.5 cm   Measurement Location 8 +10   Left 8 67.4 cm   Right 8 67.7 cm   LLE Circumferential Total 344.3 cm   RLE Circumferential Total 345.6 cm   RLE Circumferential (cm)   Measurement Location 1 BOT   Measurement Location 2 +10   Measurement Location 3 +10   Measurement Location 4 +10   Measurement Location 5 +10   Measurement Location 6 +10   Measurement Location 7 +10   Measurement Location 8 +10   Manual Lymphatic Drainage   Manual Lymphatic Drainage initial sequence;opened regional lymph nodes;opened anastamoses;extremity treatment   Initial Sequence short neck;abdomen;diaphragmatic breathing   Abdomen superficial   Diaphragmatic  Breathing x 9 with superficial abdominals   Opened Regional Lymph Nodes axillary;inguinal   Axillary right;left   Inguinal right;left   Opened Anastamoses inguino-axillary   Inguino-Axillary right;left   Extremity Treatment MLD to full limb   MLD to Full Limb BLE   Manual Therapy 60   Compression/Skin Care   Compression/Skin Care Comments Continue to wear compression leggings           Therapy Education/Self Care [id number]   Education offered today Work on CDT   Medbridge Code    Ongoing HEP   Work on CDT   Timed Minutes        Total Timed Treatment:     60   mins  Total Time of Visit:             60   mins         ASSESSMENT/PLAN     GOALS        Goals                                          Progress Note due by 3/31/23                                                      Recert due by 5/31/23   STG by: 4 weeks Comments Date Status   Patient will have a good basic understanding of lymphedema and its suggested risk reduction practices  Continue to educated during treatment. 3/1 Ongoing   Patient will be independent with remedial HEP for lymphedema She is working on the HEP. 3/1 Ongoing   Patient will be independent with self MLD for lymphedema She is working on the MLD  3/13 Met             LTG by: 12 weeks         Patient will have appropriate compression garments for lymphedema maintenance She has good fitting compression leggings 1/19 Met   Patient will have no sign or symptoms of infection No s/s of infection 1/19 Met   Patient will be independent with comprehensive maintenance program for lymphedema She will think about the Flexitouch pump. 3/1 Ongoing                                     Assessment/Plan     ASSESSMENT:  Patient had good results from a recent health screening she participated in. She has finally gotten cleared up as well. Her measurements were up a bit today, but they were pretty equal bilaterally and I think this is likely a result of the multiple steroids she has had in the past few weeks trying  to clear up her congestiong.     PLAN: Cont CDT for BLE, will see patient 2 x a week x 12 weeks.     SIGNATURE: Yojana Carrasco, PT, DPT, [facility] License #: [id number]  Electronically Signed on 3/24/2023        [address] [place] [address]. [address]  [phone]

## 2023-03-27 ENCOUNTER — TREATMENT (OUTPATIENT)
Dept: PHYSICAL THERAPY | Facility: CLINIC | Age: 63
End: 2023-03-27
Payer: COMMERCIAL

## 2023-03-27 DIAGNOSIS — I89.0 LYMPHEDEMA OF LEFT LOWER EXTREMITY: Primary | ICD-10-CM

## 2023-03-27 DIAGNOSIS — R60.9 LIPEDEMA: ICD-10-CM

## 2023-03-27 PROCEDURE — 97140 MANUAL THERAPY 1/> REGIONS: CPT | Performed by: PHYSICAL THERAPIST

## 2023-03-27 NOTE — PROGRESS NOTES
Physical Therapy Treatment Note  [address] [place] [address]    Patient: Radha Wolff                                                 Visit Date: 3/27/2023  :     1960    Referring practitioner:    EILEEN Woody  Date of Initial Visit:          Type: THERAPY  Noted: 2022    Patient seen for 13 sessions    Visit Diagnoses:    ICD-10-CM ICD-9-CM   1. Lymphedema of left lower extremity  I89.0 457.1   2. Lipedema  R60.9 782.3     SUBJECTIVE     Subjective  Her L foot is swollen from arthritis.  She continues to wear her compression daily.  She will have a CT scan Thursday of her chest.     PAIN: 0/10         OBJECTIVE     Objective    Lymphedema     Row Name 23 1530             Subjective Pain    Able to rate subjective pain? yes  -AL      Pre-Treatment Pain Level 0  -AL         Manual Lymphatic Drainage    Manual Lymphatic Drainage initial sequence;opened regional lymph nodes;opened anastamoses;extremity treatment  -AL      Initial Sequence short neck;abdomen;diaphragmatic breathing  -AL      Abdomen superficial  -AL      Diaphragmatic Breathing x 9 with superficial abdominals  -AL      Opened Regional Lymph Nodes axillary;inguinal  -AL      Axillary right;left  -AL      Inguinal right;left  -AL      Opened Anastamoses inguino-axillary  -AL      Inguino-Axillary right;left  -AL      Extremity Treatment MLD to full limb  -AL      MLD to Full Limb BLE  -AL      Manual Lymphatic Drainage Comments IASTM to BLE with red ratchet roller- medial and outer thighs  -AL      Manual Therapy 60  -AL         Compression/Skin Care    Compression/Skin Care Comments Continue to wear compression leggings.  -AL            User Key  (r) = Recorded By, (t) = Taken By, (c) = Cosigned By    Initials Name Provider Type    AL Jenna Schultz, PTA, CLROSSY-[facility] Physical Therapist Assistant                     Therapy Education/Self Care 97503    Education offered today Work on CDT, discussed the book  Lymphedema and Lipedema Nutrition Guide.   Medbridge Code    Ongoing HEP   Work on CDT   Timed Minutes        Total Timed Treatment:     60   mins  Total Time of Visit:             60   mins         ASSESSMENT/PLAN     GOALS        Goals                                          Progress Note due by 3/31/23                                                      Recert due by 5/31/23   STG by: 4 weeks Comments Date Status   Patient will have a good basic understanding of lymphedema and its suggested risk reduction practices  Continue to educated during treatment. 3/27 Ongoing   Patient will be independent with remedial HEP for lymphedema She is working on the HEP. 3/1 Ongoing   Patient will be independent with self MLD for lymphedema She is working on the MLD  3/13 Met             LTG by: 12 weeks         Patient will have appropriate compression garments for lymphedema maintenance She has good fitting compression leggings 1/19 Met   Patient will have no sign or symptoms of infection No s/s of infection 1/19 Met   Patient will be independent with comprehensive maintenance program for lymphedema She will think about the Flexitouch pump. 3/1 Ongoing                                     Assessment/Plan     ASSESSMENT:  Patient has ordered a roller so she can work on the IASTM at home.  She is planning on ordering 1-2 more pairs of the compression leggings.  She is compliant with her compression garments.  She will look into ordering the book Lymphedema and Lipedema Nutrition Guide.    PLAN: Cont CDT for BLE, she will need a progress note next treatment.     SIGNATURE: Jenna Schultz PTA, [place], [place] License #: [id number]  Electronically Signed on 3/27/2023        [address] [place] [address]. [address]  [phone]

## 2023-03-29 ENCOUNTER — TREATMENT (OUTPATIENT)
Dept: PHYSICAL THERAPY | Facility: CLINIC | Age: 63
End: 2023-03-29
Payer: COMMERCIAL

## 2023-03-29 DIAGNOSIS — R60.9 LIPEDEMA: ICD-10-CM

## 2023-03-29 DIAGNOSIS — I89.0 LYMPHEDEMA OF LEFT LOWER EXTREMITY: Primary | ICD-10-CM

## 2023-03-29 NOTE — PROGRESS NOTES
Physical Therapy Treatment Note  [address] [place] [address]    Patient: Radha Wolff                                                 Visit Date: 3/29/2023  :     1960    Referring practitioner:    EILEEN Woody  Date of Initial Visit:          Type: THERAPY  Noted: 2022    Patient seen for 14 sessions    Visit Diagnoses:    ICD-10-CM ICD-9-CM   1. Lymphedema of left lower extremity  I89.0 457.1   2. Lipedema  R60.9 782.3     SUBJECTIVE     Subjective       PAIN: 0/10         OBJECTIVE     Objective    Lymphedema     Row Name 23 1530             Subjective Pain    Able to rate subjective pain? yes  -HR      Pre-Treatment Pain Level 0  -HR         Manual Lymphatic Drainage    Manual Lymphatic Drainage initial sequence;opened regional lymph nodes;opened anastamoses;extremity treatment  -HR      Initial Sequence short neck;abdomen;diaphragmatic breathing  -HR      Abdomen superficial  -HR      Diaphragmatic Breathing x 9 with superficial abdominals  -HR      Opened Regional Lymph Nodes axillary;inguinal  -HR      Axillary right;left  -HR      Inguinal right;left  -HR      Opened Anastamoses inguino-axillary  -HR      Inguino-Axillary right;left  -HR      Manual Therapy 15  -HR            User Key  (r) = Recorded By, (t) = Taken By, (c) = Cosigned By    Initials Name Provider Type    HR Yojana Carrasco, PT, DPT, CLT-MARY Physical Therapist                   Total Timed Treatment:     15   mins  Total Time of Visit:             60   mins         ASSESSMENT/PLAN     GOALS        Goals                                          Progress Note due by 3/31/23                                                      Recert due by 23   STG by: 4 weeks Comments Date Status   Patient will have a good basic understanding of lymphedema and its suggested risk reduction practices  Continue to educated during treatment. 3/27  Ongoing   Patient will be independent with remedial HEP for lymphedema She is working on the HEP. 3/1 Ongoing   Patient will be independent with self MLD for lymphedema She is working on the MLD  3/13 Met             LTG by: 12 weeks         Patient will have appropriate compression garments for lymphedema maintenance She has good fitting compression leggings 1/19 Met   Patient will have no sign or symptoms of infection No s/s of infection 1/19 Met   Patient will be independent with comprehensive maintenance program for lymphedema She will think about the Flexitouch pump. 3/1 Ongoing                                     Assessment/Plan         SIGNATURE: Yojana Carrasco, PT, DPT, [facility] License #: [id number]  Electronically Signed on 3/29/2023        [address] [place] [address]  [phone]

## 2023-03-29 NOTE — PROGRESS NOTES
Physical Therapy Treatment Note  [address] [place] [address]    Patient: Radha Wolff                                                 Visit Date: 3/29/2023  :     1960    Referring practitioner:    EILEEN Woody  Date of Initial Visit:          Type: THERAPY  Noted: 2022    Patient seen for 14 sessions    Visit Diagnoses:    ICD-10-CM ICD-9-CM   1. Lymphedema of left lower extremity  I89.0 457.1   2. Lipedema  R60.9 782.3     SUBJECTIVE     Subjective  Her L foot is swollen from arthritis.  She continues to wear her compression daily.  She will have a CT scan Thursday of her chest.     PAIN: 0/10         OBJECTIVE     Objective    Lymphedema     Row Name 23 1545 23 1530          Subjective Pain    Able to rate subjective pain? yes  -AL yes  -HR     Pre-Treatment Pain Level 0  -AL 0  -HR        Manual Lymphatic Drainage    Manual Lymphatic Drainage -- initial sequence;opened regional lymph nodes;opened anastamoses;extremity treatment  -HR     Initial Sequence -- short neck;abdomen;diaphragmatic breathing  -HR     Abdomen -- superficial  -HR     Diaphragmatic Breathing -- x 9 with superficial abdominals  -HR     Opened Regional Lymph Nodes -- axillary;inguinal  -HR     Axillary -- right;left  -HR     Inguinal -- right;left  -HR     Opened Anastamoses -- inguino-axillary  -HR     Inguino-Axillary -- right;left  -HR     Extremity Treatment MLD to full limb  -AL --     MLD to Full Limb B LE  -AL --     Manual Lymphatic Drainage Comments IASTM to B upper legs and calves with red ratchet roller and small roller  -AL --     Manual Therapy 45  -AL 15  -HR        Compression/Skin Care    Compression/Skin Care Comments Wear compression  -AL --           User Key  (r) = Recorded By, (t) = Taken By, (c) = Cosigned By    Initials Name Provider Type    AL Jenna Schultz, PTA, CLT-MARY Physical Therapist Assistant    HR  Yojana Carrasco, PT, DPT, RISA Physical Therapist                     Therapy Education/Self Care [id number]   Education offered today Work on CDT   Medbridge Code    Ongoing HEP   Work on CDT   Timed Minutes        Total Timed Treatment:     45   mins  Total Time of Visit:             60   mins         ASSESSMENT/PLAN     GOALS        Goals                                          Progress Note due by 4/14/23                                                      Recert due by 5/31/23   STG by: 4 weeks Comments Date Status   Patient will have a good basic understanding of lymphedema and its suggested risk reduction practices  Continue to educated during treatment. 3/27 Ongoing   Patient will be independent with remedial HEP for lymphedema She is working on the HEP. 3/1 Ongoing   Patient will be independent with self MLD for lymphedema She is working on the MLD  3/13 Met             LTG by: 12 weeks         Patient will have appropriate compression garments for lymphedema maintenance She has good fitting compression leggings 1/19 Met   Patient will have no sign or symptoms of infection No s/s of infection 1/19 Met   Patient will be independent with comprehensive maintenance program for lymphedema She will work on her home maintenance program. 3/29 Ongoing                                     Assessment/Plan     ASSESSMENT:  Patient is still waiting for the roller she has ordered, she did order the book  Lymphedema and Lipedema Nutrition Guide.  She is planning on starting water aerobics this summer. We do not have any opening for treatment until May, patient will work on her home maintenance program until we can see her again.     PLAN: Cont CDT for BLE, assess how patient has done with her home maintenance program.     SIGNATURE: Jenna Schultz PTA, STEPHENIE LORD License #: [id number]  Electronically Signed on 3/29/2023        [place] [address]  [phone]

## 2023-03-30 DIAGNOSIS — F41.9 ANXIETY: ICD-10-CM

## 2023-03-30 RX ORDER — LORAZEPAM 1 MG/1
1 TABLET ORAL EVERY 8 HOURS PRN
Qty: 90 TABLET | Refills: 0 | Status: SHIPPED | OUTPATIENT
Start: 2023-03-30

## 2023-04-14 ENCOUNTER — HOSPITAL ENCOUNTER (OUTPATIENT)
Dept: CT IMAGING | Facility: HOSPITAL | Age: 63
Discharge: HOME | End: 2023-04-14
Admitting: FAMILY MEDICINE
Payer: COMMERCIAL

## 2023-04-14 DIAGNOSIS — R93.89 ABNORMAL CHEST CT: Primary | ICD-10-CM

## 2023-04-14 PROCEDURE — 71250 CT THORAX DX C-: CPT

## 2023-04-18 RX ORDER — HYDROCHLOROTHIAZIDE 12.5 MG/1
12.5 CAPSULE, GELATIN COATED ORAL DAILY
Qty: 90 CAPSULE | Refills: 1 | Status: SHIPPED | OUTPATIENT
Start: 2023-04-18

## 2023-04-27 DIAGNOSIS — F41.9 ANXIETY: ICD-10-CM

## 2023-04-28 RX ORDER — LORAZEPAM 1 MG/1
1 TABLET ORAL EVERY 8 HOURS PRN
Qty: 90 TABLET | Refills: 0 | Status: SHIPPED | OUTPATIENT
Start: 2023-04-28

## 2023-05-01 ENCOUNTER — OFFICE VISIT (OUTPATIENT)
Dept: FAMILY MEDICINE CLINIC | Facility: CLINIC | Age: 63
End: 2023-05-01
Payer: COMMERCIAL

## 2023-05-01 VITALS
DIASTOLIC BLOOD PRESSURE: 84 MMHG | WEIGHT: 210.6 LBS | HEIGHT: 65 IN | OXYGEN SATURATION: 98 % | RESPIRATION RATE: 18 BRPM | SYSTOLIC BLOOD PRESSURE: 142 MMHG | TEMPERATURE: 97.1 F | BODY MASS INDEX: 35.09 KG/M2 | HEART RATE: 88 BPM

## 2023-05-01 DIAGNOSIS — J02.9 ACUTE PHARYNGITIS, UNSPECIFIED ETIOLOGY: ICD-10-CM

## 2023-05-01 DIAGNOSIS — J30.2 SEASONAL ALLERGIES: Primary | ICD-10-CM

## 2023-05-01 RX ORDER — AMOXICILLIN AND CLAVULANATE POTASSIUM 875; 125 MG/1; MG/1
1 TABLET, FILM COATED ORAL 2 TIMES DAILY
Qty: 20 TABLET | Refills: 0 | Status: SHIPPED | OUTPATIENT
Start: 2023-05-01 | End: 2023-05-11

## 2023-05-01 RX ORDER — METHYLPREDNISOLONE 4 MG/1
TABLET ORAL
Qty: 1 EACH | Refills: 0 | Status: SHIPPED | OUTPATIENT
Start: 2023-05-01

## 2023-05-01 RX ORDER — DEXAMETHASONE SODIUM PHOSPHATE 4 MG/ML
8 INJECTION, SOLUTION INTRA-ARTICULAR; INTRALESIONAL; INTRAMUSCULAR; INTRAVENOUS; SOFT TISSUE ONCE
Status: COMPLETED | OUTPATIENT
Start: 2023-05-01 | End: 2023-05-01

## 2023-05-01 RX ORDER — FLUCONAZOLE 150 MG/1
150 TABLET ORAL
Qty: 3 TABLET | Refills: 0 | Status: SHIPPED | OUTPATIENT
Start: 2023-05-01

## 2023-05-01 RX ADMIN — DEXAMETHASONE SODIUM PHOSPHATE 8 MG: 4 INJECTION, SOLUTION INTRA-ARTICULAR; INTRALESIONAL; INTRAMUSCULAR; INTRAVENOUS; SOFT TISSUE at 15:31

## 2023-05-01 NOTE — PROGRESS NOTES
Subjective   Chief Complaint:  Allergy flare up.    History of Present Illness:  This 62 y.o. female was seen in the office today.      The patient reports ear fullness in both ears, nasal congestion, and increased sneezing. She also reports that her eyes have been puffy and burning. She reports her throat has been sore. She reports this started with green discharge and then is now clear.    Allergies   Allergen Reactions   • Bee Venom Nausea And Vomiting   • Latex Hives     01/18/2005-Latex unspecified     • Levofloxacin Delirium   • Poison Ivy Extract Hives   • Red Dye Itching     06/22/2006-Skin itch     • Tetracycline Other (See Comments)     01/18/2005-Tetracycline intolerant     • Adhesive Tape Rash   • Tegaderm Ag Mesh [Silver] Rash   • Wasp Venom Rash      Current Outpatient Medications on File Prior to Visit   Medication Sig   • cetirizine (zyrTEC) 10 MG tablet Take 1 tablet by mouth Daily.   • diphenhydrAMINE (BENADRYL) 25 mg capsule Take 1 capsule by mouth As Needed.   • hydroCHLOROthiazide (MICROZIDE) 12.5 MG capsule TAKE 1 CAPSULE BY MOUTH DAILY.   • ipratropium (ATROVENT) 0.06 % nasal spray 2 sprays into the nostril(s) as directed by provider 4 (Four) Times a Day.   • LORazepam (ATIVAN) 1 MG tablet TAKE 1 TABLET BY MOUTH EVERY 8 (EIGHT) HOURS AS NEEDED FOR ANXIETY.   • meclizine (ANTIVERT) 25 MG tablet Take 1 tablet by mouth 3 (Three) Times a Day As Needed for Dizziness.   • melatonin 5 MG tablet tablet Take 2 tablets by mouth Every Night.   • multivitamin with minerals tablet tablet Take 1 tablet by mouth Daily.   • pseudoephedrine-guaifenesin (MUCINEX D)  MG per 12 hr tablet Take 1 tablet by mouth Every 12 (Twelve) Hours.   • [DISCONTINUED] fluconazole (Diflucan) 100 MG tablet Take 1 tablet by mouth Daily.   • [DISCONTINUED] amoxicillin-clavulanate (Augmentin) 875-125 MG per tablet Take 1 tablet by mouth 2 (Two) Times a Day.     No current facility-administered medications on file prior to  visit.      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      Past Surgical History:   Procedure Laterality Date   • ADENOIDECTOMY     • BILATERAL INSERTION OF EAR TUBES AND ADENOIDECTOMY     • COLONOSCOPY N/A 02/18/2022    Procedure: COLONOSCOPY WITH ANESTHESIA;  Surgeon: Bharat Vallejo MD;  Location: [facility] ENDOSCOPY;  Service: Gastroenterology;  Laterality: N/A;  pre screen  post; polyps   Daren Zazueta MD   • LAPAROSCOPIC CHOLECYSTECTOMY     • SKIN LESION EXCISION Left     SCC of the Left upper arm   • TONSILLECTOMY       Social History     Socioeconomic History   • Marital status: Single   Tobacco Use   • Smoking status: Never   • Smokeless tobacco: Never   Substance and Sexual Activity   • Alcohol use: Not Currently   • Drug use: Never   • Sexual activity: Yes     Partners: Male     Family History   Problem Relation Age of Onset   • Lung cancer Mother    • Liver cancer Mother    • Lung cancer Father    • Obesity Brother    • Heart disease Brother    • Arthritis Brother    • No Known Problems Maternal Grandmother    • No Known Problems Paternal Grandmother    • No Known Problems Maternal Aunt    • No Known Problems Paternal Aunt    • No Known Problems Sister    • No Known Problems Daughter    • No Known Problems Son    • No Known Problems Other    • BRCA 1/2 Neg Hx    • Breast cancer Neg Hx    • Colon cancer Neg Hx    • Endometrial cancer Neg Hx    • Ovarian cancer Neg Hx    • Colon polyps Neg Hx    • Esophageal cancer Neg Hx        Objective   Physical Exam  Vitals reviewed.   Constitutional:       General: She is not in acute distress.     Appearance: Normal appearance. She is obese.   HENT:       "Ears:      Comments: Bilateral ear congestion present.     Mouth/Throat:      Comments: Throat is erythematous.    Cardiovascular:      Rate and Rhythm: Normal rate and regular rhythm.   Pulmonary:      Effort: Pulmonary effort is normal.      Breath sounds: Normal breath sounds.   Lymphadenopathy:      Comments: Bilateral anterior cervical adenopathy present.       /84 (BP Location: Right arm, Patient Position: Sitting, Cuff Size: Adult)   Pulse 88   Temp 97.1 °F (36.2 °C) (Infrared)   Resp 18   Ht 165.1 cm (65\")   Wt 95.5 kg (210 lb 9.6 oz)   SpO2 98%   BMI 35.05 kg/m²     Prior Visit Notes/Records, Lab, Imaging, and Diagnostic Results Reviewed:  CBC:  Lab Results - Last 18 Months   Lab Units 10/10/22  1432 06/28/22 2300 01/20/22  1036   WBC 10*3/mm3 8.43 7.45 8.69   HEMOGLOBIN g/dL 14.1 14.0 14.9   HEMATOCRIT % 44.0 43.0 44.8   PLATELETS 10*3/mm3 380 329 353   IRON mcg/dL 68 95  --       Chemistry:  Lab Results - Last 18 Months   Lab Units 12/19/22  0719 10/10/22  1432 06/28/22 2300 01/20/22  1036   SODIUM mmol/L  --  141 141 141   POTASSIUM mmol/L  --  3.8 4.5 4.7   CHLORIDE mmol/L  --  105 103 104   TOTAL CO2 mmol/L  --   --  27.2 26.1   CO2 mmol/L  --  26.0  --   --    GLUCOSE mg/dL  --  95 105* 101*   BUN mg/dL  --  15 14 11   CREATININE mg/dL  --  0.66 0.82 0.70   EGFR IF NONAFRICN AM mL/min/1.73  --   --   --  86   EGFR IF AFRICN AM mL/min/1.73  --   --   --  104   EGFR RESULT mL/min/1.73  --   --  82.5  --    CALCIUM mg/dL 9.7 9.5 9.9 10.1     BMI Trend:  BMI Readings from Last 10 Encounters:   05/01/23 35.05 kg/m²   03/07/23 34.45 kg/m²   02/27/23 34.71 kg/m²   02/16/23 34.71 kg/m²   02/09/23 35.58 kg/m²   12/20/22 33.95 kg/m²   12/19/22 34.61 kg/m²   11/08/22 33.45 kg/m²   10/17/22 35.11 kg/m²   10/10/22 34.63 kg/m²     Assessment & Plan   Diagnoses and all orders for this visit:    1. Seasonal allergies (Primary)  -     methylPREDNISolone (MEDROL) 4 MG dose pack; Take as directed on " package instructions.  Dispense: 1 each; Refill: 0  -     dexamethasone (DECADRON) injection 8 mg    2. Acute pharyngitis, unspecified etiology    Other orders  -     amoxicillin-clavulanate (Augmentin) 875-125 MG per tablet; Take 1 tablet by mouth 2 (Two) Times a Day for 10 days.  Dispense: 20 tablet; Refill: 0  -     fluconazole (DIFLUCAN) 150 MG tablet; Take 1 tablet by mouth Every 3 (Three) Days.  Dispense: 3 tablet; Refill: 0    Discussion:  Advised and educated plan of care. Advised steroid injection today followed by a steroid pack starting tomorrow.    Follow-up:  No follow-ups on file.    Transcribed from ambient dictation for EILEEN Thompson by Jose Squires.  05/01/23   15:37 CDT    I have personally performed the services described in this document as transcribed by the above individual, and it is both accurate and complete.    Electronically signed by Jean Haddad, 05/01/23, 3:37 PM CDT.

## 2023-05-24 NOTE — PROGRESS NOTES
Background:  Pt w seasonal allergies, anxiety, granulomatous disease LLL   Chief Complaint  abnormal chest ct    Subjective    History of Present Illness    Radha Wolff presents to [facility].  History of Present Illness  She was sick with allergies march of this year and ended up getting ct imaging.  She reports prior scan done during a painful episode last year.  She reports there was nothing there in 2008.  She has strong family history for lung cancer.  She had second hand smoke exposure.  She is worried.  I have collected the ct of chest from [facility] and an older ct abdomen from [place] to review.  No specific complaints right now.     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   has a past surgical history that includes Tonsillectomy; bilateral insertion of ear tubes and adenoidectomy; Laparoscopic cholecystectomy; Adenoidectomy; Colonoscopy (N/A, 02/18/2022); and Skin lesion excision (Left).  family history includes Arthritis in her brother; Heart disease in her brother; Liver cancer in her mother; Lung cancer in her father and mother; No Known Problems in her daughter, maternal aunt, maternal grandmother, paternal aunt, paternal grandmother, sister, son, and another family member; Obesity in her brother.   reports that she has never smoked. She has been exposed to tobacco smoke. She has never used smokeless tobacco. She reports that she does not currently use alcohol. She reports that she does not use drugs.  Allergies   Allergen Reactions    Bee Venom Nausea And Vomiting    Latex Hives     01/18/2005-Latex unspecified      Levofloxacin Delirium    Poison Ivy Extract Hives    Red Dye Itching     06/22/2006-Skin itch      Tetracycline Other (See Comments)  "    01/18/2005-Tetracycline intolerant      Adhesive Tape Rash    Tegaderm Ag Mesh [Silver] Rash    Wasp Venom Rash     Current Outpatient Medications   Medication Instructions    cetirizine (ZYRTEC) 10 mg, Oral, Daily    diphenhydrAMINE (BENADRYL) 25 mg, Oral, As Needed    fluconazole (DIFLUCAN) 150 mg, Oral, Every 3 Days    hydroCHLOROthiazide (MICROZIDE) 12.5 mg, Oral, Daily    ipratropium (ATROVENT) 0.06 % nasal spray 2 sprays, Nasal, 4 Times Daily    LORazepam (ATIVAN) 1 mg, Oral, Every 8 Hours PRN    meclizine (ANTIVERT) 25 mg, Oral, 3 Times Daily PRN    melatonin 10 mg, Oral, Nightly    methylPREDNISolone (MEDROL) 4 MG dose pack Take as directed on package instructions.    multivitamin with minerals tablet tablet 1 tablet, Oral, Daily    NON FORMULARY provitalize    pseudoephedrine-guaifenesin (MUCINEX D)  MG per 12 hr tablet 1 tablet, Oral, Every 12 Hours      Objective     Vital Signs:   /70   Pulse 66   Ht 165.1 cm (65\")   Wt 88.5 kg (195 lb)   SpO2 96% Comment: RA  BMI 32.45 kg/m²   Physical Exam  Constitutional:       General: She is not in acute distress.     Appearance: She is well-developed. She is not ill-appearing or toxic-appearing.   HENT:      Head: Atraumatic.   Eyes:      General: No scleral icterus.     Conjunctiva/sclera: Conjunctivae normal.   Cardiovascular:      Rate and Rhythm: Normal rate and regular rhythm.      Heart sounds: S1 normal and S2 normal.   Pulmonary:      Effort: Pulmonary effort is normal.      Breath sounds: Normal breath sounds.   Abdominal:      General: There is no distension.   Musculoskeletal:         General: No deformity.      Cervical back: Neck supple.   Skin:     Coloration: Skin is not pale.      Findings: No rash.   Neurological:      Mental Status: She is alert.      Result Review  Data Reviewed:      Personal review of imaging : CT scan shows LLL nodule with central/lamellar calcification        CT Chest Without Contrast (04/14/2023 08:54) "   A large centrally calcified nodule/granuloma in the left lower lobe was  noted in radiograph of the chest. It measures 12 mm in diameter.     MASSAC ct abd 1/21/2022      Assessment and Plan   Diagnoses and all orders for this visit:    1. Nodule of lower lobe of left lung (Primary)  -     CT Chest Without Contrast Diagnostic; Future    2. Family history of lung cancer    I can't confirm stability of lung nodule.  Calcification is encouraging.  Lung cancer risk given family history.  Will get follow up ct in 1/2024 for 2 year surveillance, can discontinue surveillance if stable.  Load massac ct abd into system for comparison to this index film.    Follow Up   Return in about 8 months (around 1/25/2024).  Patient was given instructions and counseling regarding her condition or for health maintenance advice. Please see specific information pulled into the AVS if appropriate.    Electronically signed by Brad Benson MD, 5/25/2023, 18:07 CDT

## 2023-05-25 ENCOUNTER — OFFICE VISIT (OUTPATIENT)
Dept: PULMONOLOGY | Facility: CLINIC | Age: 63
End: 2023-05-25
Payer: COMMERCIAL

## 2023-05-25 VITALS
SYSTOLIC BLOOD PRESSURE: 132 MMHG | BODY MASS INDEX: 32.49 KG/M2 | OXYGEN SATURATION: 96 % | WEIGHT: 195 LBS | HEART RATE: 66 BPM | HEIGHT: 65 IN | DIASTOLIC BLOOD PRESSURE: 70 MMHG

## 2023-05-25 DIAGNOSIS — R91.1 NODULE OF LOWER LOBE OF LEFT LUNG: Primary | ICD-10-CM

## 2023-05-25 DIAGNOSIS — Z80.1 FAMILY HISTORY OF LUNG CANCER: ICD-10-CM

## 2023-05-25 PROCEDURE — 99204 OFFICE O/P NEW MOD 45 MIN: CPT | Performed by: INTERNAL MEDICINE

## 2023-05-30 DIAGNOSIS — F41.9 ANXIETY: ICD-10-CM

## 2023-05-30 RX ORDER — LORAZEPAM 1 MG/1
1 TABLET ORAL EVERY 8 HOURS PRN
Qty: 90 TABLET | Refills: 0 | Status: SHIPPED | OUTPATIENT
Start: 2023-05-30

## 2023-06-01 ENCOUNTER — TREATMENT (OUTPATIENT)
Dept: PHYSICAL THERAPY | Facility: CLINIC | Age: 63
End: 2023-06-01

## 2023-06-01 DIAGNOSIS — I89.0 LYMPHEDEMA OF LEFT LOWER EXTREMITY: Primary | ICD-10-CM

## 2023-06-01 DIAGNOSIS — R60.9 LIPEDEMA: ICD-10-CM

## 2023-06-01 NOTE — PROGRESS NOTES
Physical Therapy Treatment Note, 30 Day Progress Note, and 90 Day Recertification Note  [address] [place] [address]    Patient: Radha Wolff                                                 Visit Date: 2023  :     1960    Referring practitioner:    EILEEN Woody  Date of Initial Visit:          Type: THERAPY  Noted: 2022    Patient seen for 15 sessions    Visit Diagnoses:    ICD-10-CM ICD-9-CM   1. Lymphedema of left lower extremity  I89.0 457.1   2. Lipedema  R60.9 782.3     SUBJECTIVE     Subjective   Eating a healthy diet, she is taking Provitalize which has tumeric in it, she is using the roller, and walking, and overall she is more active.  She will try to start water aerobics this summer as well.  She has lost 16 pounds.     PAIN: 0/10         OBJECTIVE     Objective    Lymphedema       Row Name 23 0945             Subjective Pain    Able to rate subjective pain? yes  -AL      Pre-Treatment Pain Level 0  -AL         Lymphedema Measurements    Measurement Type(s) Circumferential  -AL      Circumferential Areas Lower extremities  -AL         BLE Circumferential (cm)    Measurement Location 1 BOT  -AL      Left 1 20.3 cm  -AL      Right 1 21.2 cm  -AL      Measurement Location 2 +10  -AL      Left 2 27.4 cm  -AL      Right 2 26 cm  -AL      Measurement Location 3 +10  -AL      Left 3 32 cm  -AL      Right 3 32.4 cm  -AL      Measurement Location 4 +10  -AL      Left 4 41.2 cm  -AL      Right 4 42 cm  -AL      Measurement Location 5 +10  -AL      Left 5 44.6 cm  -AL      Right 5 44.9 cm  -AL      Measurement Location 6 +10  -AL      Left 6 44 cm  -AL      Right 6 46.5 cm  -AL      Measurement Location 7 +10  -AL      Left 7 56.4 cm  -AL      Right 7 59 cm  -AL      Measurement Location 8 +10  -AL      Left 8 65.2 cm  -AL      Right 8 65.2 cm  -AL      LLE Circumferential Total 331.1 cm  -AL      RLE  Circumferential Total 337.2 cm  -AL         Manual Lymphatic Drainage    Manual Lymphatic Drainage initial sequence;opened regional lymph nodes;opened anastamoses;extremity treatment  -AL      Initial Sequence short neck;abdomen;diaphragmatic breathing  -AL      Abdomen superficial  -AL      Diaphragmatic Breathing x 9 with superficial abdominals  -AL      Opened Regional Lymph Nodes axillary;inguinal  -AL      Axillary right;left  -AL      Inguinal right;left  -AL      Opened Anastamoses inguino-axillary  -AL      Inguino-Axillary right;left  -AL      Extremity Treatment MLD to full limb  -AL      MLD to Full Limb B LE  -AL      Manual Lymphatic Drainage Comments IASTM to B upper legs and calves with red ratchet roller and small roller and the massaging roller.  -AL      Manual Therapy 55  -AL         Compression/Skin Care    Compression/Skin Care Comments Wear compression as needed.  -AL                User Key  (r) = Recorded By, (t) = Taken By, (c) = Cosigned By      Initials Name Provider Type    Jenna Gamez PTA, DARWIN-MARY Physical Therapist Assistant                       Total Timed Treatment:     55   mins  Total Time of Visit:             55   mins         ASSESSMENT/PLAN     GOALS        Goals                                          Progress Note due by 7/1/23                                                      Recert due by 8/30/23   STG by: 4 weeks Comments Date Status   Patient will have a good basic understanding of lymphedema and its suggested risk reduction practices Her understanding of lymphedema has greatly improved.  3/27 Partially Met   Patient will be independent with remedial HEP for lymphedema She has been more active. 6/1 Progressing   Patient will be independent with self MLD for lymphedema She is working on the MLD  3/13 Met             LTG by: 12 weeks         Patient will have appropriate compression garments for lymphedema maintenance She has good fitting compression leggings  1/19 Met   Patient will have no sign or symptoms of infection No s/s of infection 1/19 Met   Patient will be independent with comprehensive maintenance program for lymphedema She is working towards her home maintenance program.  6/1 Ongoing                                     Assessment/Plan   She has had a reduction of 8.1 cm on the R and 8.7 cm on the R.  Patient has lost 16 pounds, she is eating better and exercising more.  She is also working on MLD and using the roller on her legs at home.  Patient is able to wear a pair of jeans she hasn't fit into in three years.  Patient is not wearing her compression as it is too hot for her to wear in the summer.  Overall she has made great progress, patient will look into starting water aerobics.       Plan  Will see patient for CDT 1 x a month x 8 weeks.     SIGNATURE: Jenna Schultz PTA, [place], [place] License #: [id number]  Electronically Signed on 6/1/2023        [address] [place] [address]. [address]  [phone]

## 2023-06-01 NOTE — PROGRESS NOTES
30 Day Progress Note and 90 Day Recertification Addendum      Patient: Radha Wolff           : 1960  Visit Date: 2023  Referring practitioner: EILEEN Woody  Date of Initial Visit: Type: THERAPY  Noted: 2022  Patient seen for 15 sessions  Visit Diagnoses:    ICD-10-CM ICD-9-CM   1. Lymphedema of left lower extremity  I89.0 457.1   2. Lipedema  R60.9 782.3          Clinical Progress: improved  Home Program Compliance: Yes  Progress toward previous goals: Partially Met  Prognosis to achieve goals: good    Objective   See PTA note for goals  Assessment & Plan     Assessment  Impairments: lacks appropriate home exercise program  Prognosis: good    Plan  Therapy options: will be seen for skilled therapy services  Planned modality interventions: low level laser therapy  Planned therapy interventions: manual therapy, therapeutic activities, home exercise program and compression  Frequency: 2x week  Duration in weeks: 12  Treatment plan discussed with: patient      I reviewed the treatment and goals with Jenna Schultz and agree with the POC.    SIGNATURE: Kena Herzog PT, License #: [id number]  Electronically Signed on 2023      90 Day Recertification  Certification Period: 2023 through 2023  Based upon review of the patient's progress and continued therapy plan, it is my medical opinion that Radha Wolff should continue physical therapy treatment at [facility]     PHYSICIAN: Juliana Strickland APRN (NPI: [id number])    Signature: __________________________________________________DATE: ___________________     Please sign and return via fax to [phone].   Thank you so much for letting us work with Radha. I appreciate your letting us work with your patients. If you have any questions or concerns, please don't hesitate to contact me.

## 2023-06-19 ENCOUNTER — OFFICE VISIT (OUTPATIENT)
Dept: FAMILY MEDICINE CLINIC | Facility: CLINIC | Age: 63
End: 2023-06-19
Payer: COMMERCIAL

## 2023-06-19 VITALS
SYSTOLIC BLOOD PRESSURE: 124 MMHG | HEIGHT: 65 IN | BODY MASS INDEX: 30.99 KG/M2 | HEART RATE: 72 BPM | WEIGHT: 186 LBS | DIASTOLIC BLOOD PRESSURE: 68 MMHG | OXYGEN SATURATION: 96 % | TEMPERATURE: 97.7 F

## 2023-06-19 DIAGNOSIS — F41.9 ANXIETY: ICD-10-CM

## 2023-06-19 DIAGNOSIS — I10 ESSENTIAL HYPERTENSION: Primary | ICD-10-CM

## 2023-06-19 PROCEDURE — 99213 OFFICE O/P EST LOW 20 MIN: CPT | Performed by: FAMILY MEDICINE

## 2023-06-19 NOTE — PROGRESS NOTES
Subjective   Radha Wolff is a 62 y.o. female.     Chief Complaint   Patient presents with   • Hypertension   • Anxiety       History of Present Illness     She notes good bp control without cp or ha--she has been taking a dietary supplement to lose weighgt --she thinks her anxiethy has been stable      Current Outpatient Medications:   •  diphenhydrAMINE (BENADRYL) 25 mg capsule, Take 1 capsule by mouth As Needed., Disp: , Rfl:   •  hydroCHLOROthiazide (MICROZIDE) 12.5 MG capsule, TAKE 1 CAPSULE BY MOUTH DAILY., Disp: 90 capsule, Rfl: 1  •  LORazepam (ATIVAN) 1 MG tablet, Take 1 tablet by mouth Every 8 (Eight) Hours As Needed for Anxiety., Disp: 90 tablet, Rfl: 0  •  meclizine (ANTIVERT) 25 MG tablet, Take 1 tablet by mouth 3 (Three) Times a Day As Needed for Dizziness., Disp: 60 tablet, Rfl: 0  •  melatonin 5 MG tablet tablet, Take 2 tablets by mouth Every Night., Disp: , Rfl:   •  multivitamin with minerals tablet tablet, Take 1 tablet by mouth Daily., Disp: , Rfl:   •  NON FORMULARY, provitalize, Disp: , Rfl:   Allergies   Allergen Reactions   • Bee Venom Nausea And Vomiting   • Latex Hives     01/18/2005-Latex unspecified     • Levofloxacin Delirium   • Poison Ivy Extract Hives   • Red Dye Itching     06/22/2006-Skin itch     • Tetracycline Other (See Comments)     01/18/2005-Tetracycline intolerant     • Adhesive Tape Rash   • Tegaderm Ag Mesh [Silver] Rash   • Wasp Venom Rash              Past Medical History:   Diagnosis Date   • Anxiety    • Arthritis    • Dizzy spells    • Elevated cholesterol    • Hematuria    • Hypertension    • Osteoarthritis    • Pancreatitis     2007   • PMB (postmenopausal bleeding)    • 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    HENT: Negative.     Eyes: Negative.    Respiratory: Negative.     Cardiovascular: Negative.    Gastrointestinal: Negative.    Endocrine: Negative.    Genitourinary: Negative.    Musculoskeletal: Negative.    Skin: Negative.    Allergic/Immunologic: Negative.    Neurological: Negative.    Hematological: Negative.    Psychiatric/Behavioral: Negative.       Objective /68   Pulse 72   Temp 97.7 °F (36.5 °C)   Ht 165.1 cm (65\")   Wt 84.4 kg (186 lb)   SpO2 96%   BMI 30.95 kg/m²    Physical Exam  Vitals and nursing note reviewed.   Constitutional:       Appearance: Normal appearance. She is normal weight.   HENT:      Head: Normocephalic and atraumatic.      Nose: Nose normal.      Mouth/Throat:      Mouth: Mucous membranes are moist.   Eyes:      Pupils: Pupils are equal, round, and reactive to light.   Cardiovascular:      Rate and Rhythm: Normal rate and regular rhythm.      Pulses: Normal pulses.      Heart sounds: Normal heart sounds.   Pulmonary:      Effort: Pulmonary effort is normal.   Abdominal:      General: Abdomen is flat.   Musculoskeletal:         General: Normal range of motion.      Cervical back: Normal range of motion and neck supple.   Skin:     General: Skin is warm.      Capillary Refill: Capillary refill takes less than 2 seconds.   Neurological:      General: No focal deficit present.      Mental Status: She is alert. Mental status is at baseline.   Psychiatric:         Mood and Affect: Mood normal.       Assessment & Plan   Diagnoses and all orders for this visit:    1. Essential hypertension (Primary)  -     CBC & Differential  -     Comprehensive metabolic panel  -     Lipid " Panel With / Chol / HDL Ratio  -     Sedimentation Rate  -     C-reactive protein    2. Anxiety  -     CBC & Differential  -     Comprehensive metabolic panel  -     Lipid Panel With / Chol / HDL Ratio      Navjot morales bp aaNd keep me informd           Orders Placed This Encounter   Procedures   • Comprehensive metabolic panel     Order Specific Question:   Release to patient     Answer:   Routine Release   • Lipid Panel With / Chol / HDL Ratio     Order Specific Question:   Release to patient     Answer:   Routine Release   • Sedimentation Rate     Order Specific Question:   Release to patient     Answer:   Routine Release   • C-reactive protein     Order Specific Question:   Release to patient     Answer:   Routine Release   • CBC & Differential       Follow up: 6 month(s)

## 2023-06-20 LAB
ALBUMIN SERPL-MCNC: 4.3 G/DL (ref 3.5–5.2)
ALBUMIN/GLOB SERPL: 2 G/DL
ALP SERPL-CCNC: 62 U/L (ref 39–117)
ALT SERPL-CCNC: 13 U/L (ref 1–33)
AST SERPL-CCNC: 14 U/L (ref 1–32)
BASOPHILS # BLD AUTO: 0.06 10*3/MM3 (ref 0–0.2)
BASOPHILS NFR BLD AUTO: 1 % (ref 0–1.5)
BILIRUB SERPL-MCNC: 0.5 MG/DL (ref 0–1.2)
BUN SERPL-MCNC: 12 MG/DL (ref 8–23)
BUN/CREAT SERPL: 16.2 (ref 7–25)
CALCIUM SERPL-MCNC: 10.2 MG/DL (ref 8.6–10.5)
CHLORIDE SERPL-SCNC: 103 MMOL/L (ref 98–107)
CHOLEST SERPL-MCNC: 231 MG/DL (ref 0–200)
CO2 SERPL-SCNC: 26.7 MMOL/L (ref 22–29)
CREAT SERPL-MCNC: 0.74 MG/DL (ref 0.57–1)
CRP SERPL-MCNC: 0.5 MG/DL (ref 0–0.5)
EGFRCR SERPLBLD CKD-EPI 2021: 92.8 ML/MIN/1.73
EOSINOPHIL # BLD AUTO: 0.19 10*3/MM3 (ref 0–0.4)
EOSINOPHIL NFR BLD AUTO: 3.2 % (ref 0.3–6.2)
ERYTHROCYTE [DISTWIDTH] IN BLOOD BY AUTOMATED COUNT: 12.5 % (ref 12.3–15.4)
ERYTHROCYTE [SEDIMENTATION RATE] IN BLOOD BY WESTERGREN METHOD: 17 MM/HR (ref 0–30)
GLOBULIN SER CALC-MCNC: 2.2 GM/DL
GLUCOSE SERPL-MCNC: 109 MG/DL (ref 65–99)
HCT VFR BLD AUTO: 43.9 % (ref 34–46.6)
HDLC SERPL-MCNC: 81 MG/DL (ref 40–60)
HGB BLD-MCNC: 14.7 G/DL (ref 12–15.9)
IMM GRANULOCYTES # BLD AUTO: 0.01 10*3/MM3 (ref 0–0.05)
IMM GRANULOCYTES NFR BLD AUTO: 0.2 % (ref 0–0.5)
LDLC SERPL CALC-MCNC: 140 MG/DL (ref 0–100)
LDLC/HDLC SERPL: 1.7 {RATIO}
LYMPHOCYTES # BLD AUTO: 1.73 10*3/MM3 (ref 0.7–3.1)
LYMPHOCYTES NFR BLD AUTO: 28.8 % (ref 19.6–45.3)
MCH RBC QN AUTO: 30.1 PG (ref 26.6–33)
MCHC RBC AUTO-ENTMCNC: 33.5 G/DL (ref 31.5–35.7)
MCV RBC AUTO: 89.8 FL (ref 79–97)
MONOCYTES # BLD AUTO: 0.51 10*3/MM3 (ref 0.1–0.9)
MONOCYTES NFR BLD AUTO: 8.5 % (ref 5–12)
NEUTROPHILS # BLD AUTO: 3.5 10*3/MM3 (ref 1.7–7)
NEUTROPHILS NFR BLD AUTO: 58.3 % (ref 42.7–76)
NRBC BLD AUTO-RTO: 0 /100 WBC (ref 0–0.2)
PLATELET # BLD AUTO: 281 10*3/MM3 (ref 140–450)
POTASSIUM SERPL-SCNC: 4.1 MMOL/L (ref 3.5–5.2)
PROT SERPL-MCNC: 6.5 G/DL (ref 6–8.5)
RBC # BLD AUTO: 4.89 10*6/MM3 (ref 3.77–5.28)
SODIUM SERPL-SCNC: 141 MMOL/L (ref 136–145)
TRIGL SERPL-MCNC: 60 MG/DL (ref 0–150)
VLDLC SERPL CALC-MCNC: 10 MG/DL (ref 5–40)
WBC # BLD AUTO: 6 10*3/MM3 (ref 3.4–10.8)

## 2023-07-27 DIAGNOSIS — F41.9 ANXIETY: ICD-10-CM

## 2023-07-28 RX ORDER — LORAZEPAM 1 MG/1
1 TABLET ORAL EVERY 8 HOURS PRN
Qty: 90 TABLET | Refills: 0 | Status: SHIPPED | OUTPATIENT
Start: 2023-07-28

## 2023-08-27 DIAGNOSIS — F41.9 ANXIETY: ICD-10-CM

## 2023-08-28 RX ORDER — LORAZEPAM 1 MG/1
1 TABLET ORAL EVERY 8 HOURS PRN
Qty: 90 TABLET | Refills: 0 | Status: SHIPPED | OUTPATIENT
Start: 2023-08-28

## 2023-09-01 ENCOUNTER — OFFICE VISIT (OUTPATIENT)
Dept: FAMILY MEDICINE CLINIC | Facility: CLINIC | Age: 63
End: 2023-09-01
Payer: COMMERCIAL

## 2023-09-01 VITALS
HEIGHT: 65 IN | SYSTOLIC BLOOD PRESSURE: 124 MMHG | WEIGHT: 177 LBS | TEMPERATURE: 97.7 F | DIASTOLIC BLOOD PRESSURE: 84 MMHG | HEART RATE: 71 BPM | OXYGEN SATURATION: 98 % | RESPIRATION RATE: 18 BRPM | BODY MASS INDEX: 29.49 KG/M2

## 2023-09-01 DIAGNOSIS — J30.2 SEASONAL ALLERGIC RHINITIS, UNSPECIFIED TRIGGER: ICD-10-CM

## 2023-09-01 DIAGNOSIS — Z20.822 SUSPECTED COVID-19 VIRUS INFECTION: Primary | ICD-10-CM

## 2023-09-01 RX ORDER — AZITHROMYCIN 250 MG/1
TABLET, FILM COATED ORAL
Qty: 6 TABLET | Refills: 0 | Status: SHIPPED | OUTPATIENT
Start: 2023-09-01 | End: 2023-09-06

## 2023-09-01 RX ORDER — METHYLPREDNISOLONE 4 MG/1
TABLET ORAL
Qty: 1 EACH | Refills: 0 | Status: SHIPPED | OUTPATIENT
Start: 2023-09-01

## 2023-09-01 NOTE — PROGRESS NOTES
Subjective   Chief Complaint:  Eyes itching and burning    History of Present Illness:  This 62 y.o. female was seen in the office today.  Reports eyes itching and burning reports sinus pressure and ears feeling clogged at times with cute onset this week.  Reports has been off allergy medication due to dryness of the optic nerve.    Allergies   Allergen Reactions    Bee Venom Nausea And Vomiting    Latex Hives     01/18/2005-Latex unspecified      Levofloxacin Delirium    Poison Ivy Extract Hives    Red Dye Itching     06/22/2006-Skin itch      Tetracycline Other (See Comments)     01/18/2005-Tetracycline intolerant      Adhesive Tape Rash    Tegaderm Ag Mesh [Silver] Rash    Wasp Venom Rash      Current Outpatient Medications on File Prior to Visit   Medication Sig    diphenhydrAMINE (BENADRYL) 25 mg capsule Take 1 capsule by mouth As Needed.    hydroCHLOROthiazide (MICROZIDE) 12.5 MG capsule TAKE 1 CAPSULE BY MOUTH DAILY.    LORazepam (ATIVAN) 1 MG tablet Take 1 tablet by mouth Every 8 (Eight) Hours As Needed for Anxiety.    meclizine (ANTIVERT) 25 MG tablet Take 1 tablet by mouth 3 (Three) Times a Day As Needed for Dizziness.    melatonin 5 MG tablet tablet Take 2 tablets by mouth Every Night.    multivitamin with minerals tablet tablet Take 1 tablet by mouth Daily.    NON FORMULARY provitalize     No current facility-administered medications on file prior to visit.      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      Past Surgical History:   Procedure Laterality Date    ADENOIDECTOMY      BILATERAL INSERTION OF EAR TUBES AND ADENOIDECTOMY      COLONOSCOPY N/A 02/18/2022    Procedure: COLONOSCOPY WITH ANESTHESIA;  Surgeon:  Bharat Vallejo MD;  Location: [facility] ENDOSCOPY;  Service: Gastroenterology;  Laterality: N/A;  pre screen  post; polyps   Daren Zazueta MD    LAPAROSCOPIC CHOLECYSTECTOMY      SKIN LESION EXCISION Left     SCC of the Left upper arm    TONSILLECTOMY       Social History     Socioeconomic History    Marital status: Single   Tobacco Use    Smoking status: Never     Passive exposure: Past    Smokeless tobacco: Never   Substance and Sexual Activity    Alcohol use: Not Currently    Drug use: Never    Sexual activity: Yes     Partners: Male     Family History   Problem Relation Age of Onset    Lung cancer Mother     Liver cancer Mother     Lung cancer Father     Obesity Brother     Heart disease Brother     Arthritis Brother     No Known Problems Maternal Grandmother     No Known Problems Paternal Grandmother     No Known Problems Maternal Aunt     No Known Problems Paternal Aunt     No Known Problems Sister     No Known Problems Daughter     No Known Problems Son     No Known Problems Other     BRCA 1/2 Neg Hx     Breast cancer Neg Hx     Colon cancer Neg Hx     Endometrial cancer Neg Hx     Ovarian cancer Neg Hx     Colon polyps Neg Hx     Esophageal cancer Neg Hx        Prior Visit Notes/Records, Lab, Imaging, and Diagnostic Results Reviewed:  A1C:No results for input(s): HGBA1C in the last 81078 hours.  GLUCOSE:  Lab Results - Last 18 Months   Lab Units 06/19/23  0740 10/10/22  1432 06/28/22  2300   GLUCOSE mg/dL 109* 95 105*     LIPID:  Lab Results - Last 18 Months   Lab Units 06/19/23  0740 12/19/22  0719   CHOLESTEROL mg/dL 231* 220*   LDL CHOL mg/dL 140* 128*   HDL CHOL mg/dL 81* 83   TRIGLYCERIDES mg/dL 60 54     PSA:No results for input(s): PSA in the last 31837 hours.  CBC:  Lab Results - Last 18 Months   Lab Units 06/19/23  0740 10/10/22  1432 06/28/22  2300   WBC 10*3/mm3 6.00 8.43 7.45   HEMOGLOBIN g/dL 14.7 14.1 14.0   HEMATOCRIT % 43.9 44.0 43.0   PLATELETS 10*3/mm3 281 380 329   IRON mcg/dL   "--  68 95      BMP/CMP:  Lab Results - Last 18 Months   Lab Units 06/19/23  0740 12/19/22  0719 10/10/22  1432 06/28/22  2300   SODIUM mmol/L 141  --  141 141   POTASSIUM mmol/L 4.1  --  3.8 4.5   CHLORIDE mmol/L 103  --  105 103   CO2 mmol/L 26.7  --  26.0 27.2   GLUCOSE mg/dL 109*  --  95 105*   BUN mg/dL 12  --  15 14   CREATININE mg/dL 0.74  --  0.66 0.82   EGFR RESULT mL/min/1.73 92.8  --   --  82.5   CALCIUM mg/dL 10.2 9.7 9.5 9.9     HEPATIC:  Lab Results - Last 18 Months   Lab Units 06/19/23  0740 10/10/22  1432 06/28/22  2300   ALT (SGPT) U/L 13 16 12   AST (SGOT) U/L 14 23 16   ALK PHOS U/L 62 81 89     Vit D:No results for input(s): EKUQ25VT in the last 94918 hours.  THYROID:  Lab Results - Last 18 Months   Lab Units 10/10/22  1432 06/28/22  2300   TSH uIU/mL 2.880 4.130   FREE T4 ng/dL 1.05 1.12     BMI Trend:  BMI Readings from Last 10 Encounters:   09/01/23 29.45 kg/mý   06/19/23 30.95 kg/mý   05/25/23 32.45 kg/mý   05/01/23 35.05 kg/mý   03/07/23 34.45 kg/mý   02/27/23 34.71 kg/mý   02/16/23 34.71 kg/mý   02/09/23 35.58 kg/mý   12/20/22 33.95 kg/mý   12/19/22 34.61 kg/mý     Objective   Vital Signs  /84 (BP Location: Left arm, Patient Position: Sitting, Cuff Size: Adult)   Pulse 71   Temp 97.7 øF (36.5 øC) (Infrared)   Resp 18   Ht 165.1 cm (65\")   Wt 80.3 kg (177 lb)   SpO2 98%   BMI 29.45 kg/mý   Physical Exam  Vitals reviewed.   Constitutional:       General: She is not in acute distress.     Appearance: Normal appearance.   HENT:      Right Ear: Ear canal normal.      Left Ear: Tympanic membrane and ear canal normal.      Mouth/Throat:      Comments: Clear thick postnasal drip otherwise no erythema  Cardiovascular:      Rate and Rhythm: Normal rate and regular rhythm.   Pulmonary:      Effort: Pulmonary effort is normal. No respiratory distress.      Breath sounds: Normal breath sounds. No wheezing or rhonchi.   Lymphadenopathy:      Cervical: No cervical adenopathy.       Assessment " & Plan   Diagnoses and all orders for this visit:    1. Suspected COVID-19 virus infection (Primary)  -     POCT VERITOR SARS-CoV-2 Antigen    2. Seasonal allergic rhinitis, unspecified trigger    Other orders  -     methylPREDNISolone (MEDROL) 4 MG dose pack; Take as directed on package instructions.  Dispense: 1 each; Refill: 0  -     azithromycin (Zithromax Z-Phil) 250 MG tablet; Take 2 tablets the first day, then 1 tablet daily for 4 days.  Dispense: 6 tablet; Refill: 0    Discussion:  Advised and educated plan of care.  Advise could benefit from a short round of antihistamines such as Zyrtec but as not to cause problems like before, keep 2 weeks or less.    Follow-up:  Return for follow-up as needed.    Electronically signed by EILEEN Thompson, 09/01/23, 3:49 PM CDT.

## 2023-09-12 ENCOUNTER — TREATMENT (OUTPATIENT)
Dept: PHYSICAL THERAPY | Facility: CLINIC | Age: 63
End: 2023-09-12
Payer: COMMERCIAL

## 2023-09-12 DIAGNOSIS — I89.0 LYMPHEDEMA OF LEFT LOWER EXTREMITY: Primary | ICD-10-CM

## 2023-09-12 DIAGNOSIS — R60.9 LIPEDEMA: ICD-10-CM

## 2023-09-12 PROCEDURE — 97140 MANUAL THERAPY 1/> REGIONS: CPT | Performed by: PHYSICAL THERAPIST

## 2023-09-12 NOTE — PROGRESS NOTES
Physical Therapy Treatment Note and Discharge Note  [address] [place] [address]    Patient: Radha Wolff                                                 Visit Date: 2023  :     1960    Referring practitioner:    EILEEN Woody  Date of Initial Visit:          Type: THERAPY  Noted: 2022    Patient seen for 17 sessions    Visit Diagnoses:    ICD-10-CM ICD-9-CM   1. Lymphedema of left lower extremity  I89.0 457.1   2. Lipedema  R60.9 782.3     SUBJECTIVE     Subjective    She has last 40 pounds total and is now 167 pounds.  She has been more tired since school started. She is eating clean, eating tumeric, and exercising.  She states her legs are smaller and her clothes are looser.  She states she may be swollen  because she had to take steroids for her allergies. Patient had to cancel her last appointment.      PAIN: 0/10           OBJECTIVE     Objective    Lymphedema       Row Name 23 1520             Subjective Pain    Able to rate subjective pain? yes  -AL      Pre-Treatment Pain Level 0  -AL         Lymphedema Measurements    Measurement Type(s) Circumferential  -AL      Circumferential Areas Lower extremities  -AL         BLE Circumferential (cm)    Measurement Location 1 BOT  -AL      Left 1 20.2 cm  -AL      Right 1 20.9 cm  -AL      Measurement Location 2 +10  -AL      Left 2 29 cm  -AL      Right 2 25.9 cm  -AL      Measurement Location 3 +10  -AL      Left 3 31 cm  -AL      Right 3 30.5 cm  -AL      Measurement Location 4 +10  -AL      Left 4 39.8 cm  -AL      Right 4 40 cm  -AL      Measurement Location 5 +10  -AL      Left 5 41.9 cm  -AL      Right 5 42.4 cm  -AL      Measurement Location 6 +10  -AL      Left 6 41.1 cm  -AL      Right 6 43.9 cm  -AL      Measurement Location 7 +10  -AL      Left 7 52.9 cm  -AL      Right 7 53 cm  -AL      Measurement Location 8 +10  -AL      Left 8 60.4 cm  -AL       Right 8 63.2 cm  -AL      LLE Circumferential Total 316.3 cm  -AL      RLE Circumferential Total 319.8 cm  -AL         Manual Lymphatic Drainage    Manual Lymphatic Drainage initial sequence;opened regional lymph nodes;opened anastamoses;extremity treatment  -AL      Initial Sequence short neck;abdomen;diaphragmatic breathing  -AL      Abdomen superficial  -AL      Diaphragmatic Breathing x 9 with superficial abdominals  -AL      Opened Regional Lymph Nodes axillary;inguinal  -AL      Axillary right;left  -AL      Inguinal right;left  -AL      Opened Anastamoses inguino-axillary  -AL      Inguino-Axillary right;left  -AL      Extremity Treatment MLD to full limb  -AL      MLD to Full Limb B LE  -AL      Manual Lymphatic Drainage Comments IASTM to B upper legs and calves with small roller and the hand held massager.  -AL      Manual Therapy 60  -AL         Compression/Skin Care    Compression/Skin Care Comments Wear compression.  -AL                User Key  (r) = Recorded By, (t) = Taken By, (c) = Cosigned By      Initials Name Provider Type    Jenna Gamez PTA, RISA Physical Therapist Assistant                       Total Timed Treatment:     60   mins  Total Time of Visit:             60   mins         ASSESSMENT/PLAN     GOALS        Goals                                          Progress Note due by 8/1/23                                                      Recert due by 10/28/23   STG by: 4 weeks Comments Date Status   Patient will have a good basic understanding of lymphedema and its suggested risk reduction practices Her understanding of lymphedema has greatly improved.  7/3 Met   Patient will be independent with remedial HEP for lymphedema She has been exercising  9/12 Met   Patient will be independent with self MLD for lymphedema She is working on the MLD  3/13 Met             LTG by: 12 weeks         Patient will have appropriate compression garments for lymphedema maintenance She has good  fitting compression leggings  Met   Patient will have no sign or symptoms of infection No s/s of infection  Met   Patient will be independent with comprehensive maintenance program for lymphedema She is independent with her home maintenance program.  Met                                     Assessment/Plan   She has had a reduction of 8.3 cm on the L and 12.6 cm on the R.  Patient has lost 40 pounds, she is on a good diet and exercising.  Patient has now lost 40 pounds and has been diligent overall with her weight loss and lymphedema journey. Patient has met all of her goals.       Plan  D/C patient.     SIGNATURE: Jenna Schultz PTA, STEPHENIE LORD License #: [id number]  Electronically Signed on 2023      Physical Therapy Discharge Summary  [address] [place] [address]    Patient: Radha Wolff                                                                                     Today's Date: 2023  :     1960    Date of Initial Visit:          Type: THERAPY  Noted: 2022    Patient seen for 17 sessions    Visit Diagnoses:    ICD-10-CM ICD-9-CM   1. Lymphedema of left lower extremity  I89.0 457.1   2. Lipedema  R60.9 782.3       GOALS:  See goal chart above.    DISCHARGE SUMMARY   Discharge date 2023   Dates of this episode 22 through 23   Number of visits on this episode 17   Reason for discharge all goals met   Outcomes achieved Refer to the goals table for specifics on goals   Discharge plan Continue with current home exercise program as instructed   Summary of care Patient has been diligent with working on CDT and her diet.     Discharge instruction Continue to work on home maintenance program.     SIGNATURE: Jenna Schultz PTA, STEPHENIE LORD License #: [id number]  Electronically Signed on 2023      [address] [place] [address]  [phone]

## 2023-09-25 DIAGNOSIS — F41.9 ANXIETY: ICD-10-CM

## 2023-09-26 RX ORDER — LORAZEPAM 1 MG/1
1 TABLET ORAL EVERY 8 HOURS PRN
Qty: 90 TABLET | Refills: 0 | Status: SHIPPED | OUTPATIENT
Start: 2023-09-26

## 2023-10-10 ENCOUNTER — TELEPHONE (OUTPATIENT)
Dept: OBSTETRICS AND GYNECOLOGY | Facility: CLINIC | Age: 63
End: 2023-10-10
Payer: COMMERCIAL

## 2023-10-10 DIAGNOSIS — Z12.31 ENCOUNTER FOR SCREENING MAMMOGRAM FOR MALIGNANT NEOPLASM OF BREAST: Primary | ICD-10-CM

## 2023-10-10 NOTE — TELEPHONE ENCOUNTER
"    Caller: Radha Wolff \"CHANTE\"    Relationship: Self    Best call back number: [phone]    What orders are you requesting (i.e. lab or imaging): SCREENING MAMMOGRAM    In what timeframe would the patient need to come in: AFTER 11/9/22    Where will you receive your lab/imaging services: [facility]    Additional notes: PATIENT NEEDS ORDER TO SCHEDULE MAMMOGRAM - NO IMPLANTS - NOT CURRENT BREAST PROBLEMS     PATIENT WOULD LIKE A CALL BACK WHEN ORDER HAS BEEN PLACED        "

## 2023-10-25 DIAGNOSIS — F41.9 ANXIETY: ICD-10-CM

## 2023-10-25 RX ORDER — LORAZEPAM 1 MG/1
1 TABLET ORAL EVERY 8 HOURS PRN
Qty: 90 TABLET | Refills: 0 | Status: SHIPPED | OUTPATIENT
Start: 2023-10-25

## 2023-11-10 ENCOUNTER — HOSPITAL ENCOUNTER (OUTPATIENT)
Dept: MAMMOGRAPHY | Facility: HOSPITAL | Age: 63
Discharge: HOME | End: 2023-11-10
Admitting: OBSTETRICS & GYNECOLOGY
Payer: COMMERCIAL

## 2023-11-10 PROCEDURE — 77067 SCR MAMMO BI INCL CAD: CPT

## 2023-11-10 PROCEDURE — 77063 BREAST TOMOSYNTHESIS BI: CPT

## 2023-11-26 DIAGNOSIS — F41.9 ANXIETY: ICD-10-CM

## 2023-11-27 RX ORDER — LORAZEPAM 1 MG/1
1 TABLET ORAL EVERY 8 HOURS PRN
Qty: 90 TABLET | Refills: 0 | Status: SHIPPED | OUTPATIENT
Start: 2023-11-27

## 2023-12-07 ENCOUNTER — TELEPHONE (OUTPATIENT)
Dept: FAMILY MEDICINE CLINIC | Facility: CLINIC | Age: 63
End: 2023-12-07
Payer: COMMERCIAL

## 2023-12-07 NOTE — TELEPHONE ENCOUNTER
"Caller: Radha Wolff \"CHANTE\"    Relationship to patient: Self    Best call back number: [phone] (CALL BEFORE 8:30AM OR AFTER 3PM)    Chief complaint: BLOOD WORK     Type of visit: LAB    Requested date: 12.14.23, 12.15.23     If rescheduling, when is the original appointment:      Additional notes:PATIENT IS WANTING TO HAVE BLOODWORK DONE BEFORE APPT ON 12.19.23             "

## 2023-12-14 LAB
ALBUMIN SERPL-MCNC: 4.8 G/DL (ref 3.5–5.2)
ALBUMIN/GLOB SERPL: 2.5 G/DL
ALP SERPL-CCNC: 62 U/L (ref 39–117)
ALT SERPL-CCNC: 16 U/L (ref 1–33)
AST SERPL-CCNC: 24 U/L (ref 1–32)
BASOPHILS # BLD AUTO: 0.05 10*3/MM3 (ref 0–0.2)
BASOPHILS NFR BLD AUTO: 0.7 % (ref 0–1.5)
BILIRUB SERPL-MCNC: 0.7 MG/DL (ref 0–1.2)
BUN SERPL-MCNC: 11 MG/DL (ref 8–23)
BUN/CREAT SERPL: 14.3 (ref 7–25)
CALCIUM SERPL-MCNC: 10 MG/DL (ref 8.6–10.5)
CHLORIDE SERPL-SCNC: 100 MMOL/L (ref 98–107)
CHOLEST SERPL-MCNC: 218 MG/DL (ref 0–200)
CHOLEST/HDLC SERPL: 2.69 {RATIO}
CO2 SERPL-SCNC: 27.6 MMOL/L (ref 22–29)
CREAT SERPL-MCNC: 0.77 MG/DL (ref 0.57–1)
EGFRCR SERPLBLD CKD-EPI 2021: 88.4 ML/MIN/1.73
EOSINOPHIL # BLD AUTO: 0.2 10*3/MM3 (ref 0–0.4)
EOSINOPHIL NFR BLD AUTO: 2.9 % (ref 0.3–6.2)
ERYTHROCYTE [DISTWIDTH] IN BLOOD BY AUTOMATED COUNT: 12.5 % (ref 12.3–15.4)
GLOBULIN SER CALC-MCNC: 1.9 GM/DL
GLUCOSE SERPL-MCNC: 98 MG/DL (ref 65–99)
HCT VFR BLD AUTO: 42.2 % (ref 34–46.6)
HDLC SERPL-MCNC: 81 MG/DL (ref 40–60)
HGB BLD-MCNC: 13.9 G/DL (ref 12–15.9)
IMM GRANULOCYTES # BLD AUTO: 0.01 10*3/MM3 (ref 0–0.05)
IMM GRANULOCYTES NFR BLD AUTO: 0.1 % (ref 0–0.5)
LDLC SERPL CALC-MCNC: 127 MG/DL (ref 0–100)
LYMPHOCYTES # BLD AUTO: 2.75 10*3/MM3 (ref 0.7–3.1)
LYMPHOCYTES NFR BLD AUTO: 40.1 % (ref 19.6–45.3)
MCH RBC QN AUTO: 30.4 PG (ref 26.6–33)
MCHC RBC AUTO-ENTMCNC: 32.9 G/DL (ref 31.5–35.7)
MCV RBC AUTO: 92.3 FL (ref 79–97)
MONOCYTES # BLD AUTO: 0.46 10*3/MM3 (ref 0.1–0.9)
MONOCYTES NFR BLD AUTO: 6.7 % (ref 5–12)
NEUTROPHILS # BLD AUTO: 3.38 10*3/MM3 (ref 1.7–7)
NEUTROPHILS NFR BLD AUTO: 49.5 % (ref 42.7–76)
NRBC BLD AUTO-RTO: 0 /100 WBC (ref 0–0.2)
PLATELET # BLD AUTO: 344 10*3/MM3 (ref 140–450)
POTASSIUM SERPL-SCNC: 4 MMOL/L (ref 3.5–5.2)
PROT SERPL-MCNC: 6.7 G/DL (ref 6–8.5)
RBC # BLD AUTO: 4.57 10*6/MM3 (ref 3.77–5.28)
SODIUM SERPL-SCNC: 138 MMOL/L (ref 136–145)
TRIGL SERPL-MCNC: 57 MG/DL (ref 0–150)
VLDLC SERPL CALC-MCNC: 10 MG/DL (ref 5–40)
WBC # BLD AUTO: 6.85 10*3/MM3 (ref 3.4–10.8)

## 2023-12-19 ENCOUNTER — OFFICE VISIT (OUTPATIENT)
Dept: FAMILY MEDICINE CLINIC | Facility: CLINIC | Age: 63
End: 2023-12-19
Payer: COMMERCIAL

## 2023-12-19 VITALS
SYSTOLIC BLOOD PRESSURE: 128 MMHG | TEMPERATURE: 98.6 F | RESPIRATION RATE: 18 BRPM | HEART RATE: 70 BPM | DIASTOLIC BLOOD PRESSURE: 74 MMHG | BODY MASS INDEX: 26.16 KG/M2 | OXYGEN SATURATION: 96 % | WEIGHT: 157 LBS | HEIGHT: 65 IN

## 2023-12-19 DIAGNOSIS — E78.2 MIXED HYPERLIPIDEMIA: ICD-10-CM

## 2023-12-19 DIAGNOSIS — I10 ESSENTIAL HYPERTENSION: Primary | ICD-10-CM

## 2023-12-19 RX ORDER — CHLORAL HYDRATE 500 MG
CAPSULE ORAL
COMMUNITY

## 2023-12-19 NOTE — PROGRESS NOTES
Subjective   Radha Wolff is a 62 y.o. female.     Chief Complaint   Patient presents with    Hypertension     Follow up labs        Hypertension         She has lost weight through diet and exercise ..      Current Outpatient Medications:     diphenhydrAMINE (BENADRYL) 25 mg capsule, Take 1 capsule by mouth As Needed., Disp: , Rfl:     hydroCHLOROthiazide (MICROZIDE) 12.5 MG capsule, TAKE 1 CAPSULE BY MOUTH DAILY., Disp: 90 capsule, Rfl: 1    LORazepam (ATIVAN) 1 MG tablet, Take 1 tablet by mouth Every 8 (Eight) Hours As Needed for Anxiety., Disp: 90 tablet, Rfl: 0    meclizine (ANTIVERT) 25 MG tablet, Take 1 tablet by mouth 3 (Three) Times a Day As Needed for Dizziness., Disp: 60 tablet, Rfl: 0    melatonin 5 MG tablet tablet, Take 2 tablets by mouth Every Night., Disp: , Rfl:     multivitamin with minerals tablet tablet, Take 1 tablet by mouth Daily., Disp: , Rfl:     NON FORMULARY, provitalize, Disp: , Rfl:     Omega-3 Fatty Acids (fish oil) 1000 MG capsule capsule, Take  by mouth Daily With Breakfast. 1400 mg qd, Disp: , Rfl:   Allergies   Allergen Reactions    Bee Venom Nausea And Vomiting    Latex Hives     01/18/2005-Latex unspecified      Levofloxacin Delirium    Poison Ivy Extract Hives    Red Dye Itching     06/22/2006-Skin itch      Tetracycline Other (See Comments)     01/18/2005-Tetracycline intolerant      Adhesive Tape Rash    Tegaderm Ag Mesh [Silver] Rash    Wasp Venom Rash              Facility age limit for growth %mohsen is 20 years.    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    HENT: Negative.     Eyes: Negative.    Respiratory: Negative.     Cardiovascular: Negative.    Gastrointestinal: Negative.    Endocrine: Negative.    Genitourinary: Negative.    Musculoskeletal: Negative.    Skin: Negative.    Allergic/Immunologic: Negative.    Neurological: Negative.    Hematological: Negative.    Psychiatric/Behavioral: Negative.         Objective /74   Pulse 70   Temp 98.6 °F (37 °C)   Resp 18   Ht 165.1 cm (65\")   Wt 71.2 kg (157 lb)   SpO2 96%   BMI 26.13 kg/m²    Physical Exam  Vitals and nursing note reviewed.   Constitutional:       Appearance: Normal appearance.   HENT:      Head: Normocephalic and atraumatic.      Nose: Nose normal.      Mouth/Throat:      Mouth: Mucous membranes are moist.   Eyes:      Pupils: Pupils are equal, round, and reactive to light.   Cardiovascular:      Rate and Rhythm: Normal rate.      Pulses: Normal pulses.      Heart sounds: Normal heart sounds.   Pulmonary:      Effort: Pulmonary effort is normal.   Abdominal:      General: Abdomen is flat.   Musculoskeletal:         General: Normal range of motion.      Cervical back: Normal range of motion and neck supple.   Skin:     General: Skin is warm and dry.      Capillary Refill: Capillary refill takes less than 2 seconds.   Neurological:      General: No focal deficit present.      Mental Status: She is alert.   Psychiatric:         Mood and Affect: Mood normal.         Assessment & Plan   Diagnoses and all orders for this visit:    1. Essential hypertension (Primary)    2. Mixed hyperlipidemia      She declines tx for LDL--she prefers diet and " exercise     We discussed labs       No orders of the defined types were placed in this encounter.      Follow up: 6 month(s)

## 2023-12-25 DIAGNOSIS — F41.9 ANXIETY: ICD-10-CM

## 2023-12-26 RX ORDER — LORAZEPAM 1 MG/1
1 TABLET ORAL EVERY 8 HOURS PRN
Qty: 90 TABLET | Refills: 0 | Status: SHIPPED | OUTPATIENT
Start: 2023-12-26

## 2023-12-27 ENCOUNTER — OFFICE VISIT (OUTPATIENT)
Dept: OBSTETRICS AND GYNECOLOGY | Facility: CLINIC | Age: 63
End: 2023-12-27
Payer: COMMERCIAL

## 2023-12-27 VITALS
SYSTOLIC BLOOD PRESSURE: 122 MMHG | DIASTOLIC BLOOD PRESSURE: 76 MMHG | BODY MASS INDEX: 26.66 KG/M2 | HEIGHT: 65 IN | WEIGHT: 160 LBS

## 2023-12-27 DIAGNOSIS — Z78.9 NON-SMOKER: ICD-10-CM

## 2023-12-27 DIAGNOSIS — Z78.0 MENOPAUSE: ICD-10-CM

## 2023-12-27 DIAGNOSIS — Z01.419 WOMEN'S ANNUAL ROUTINE GYNECOLOGICAL EXAMINATION: Primary | ICD-10-CM

## 2023-12-27 PROCEDURE — 99396 PREV VISIT EST AGE 40-64: CPT | Performed by: OBSTETRICS & GYNECOLOGY

## 2023-12-27 NOTE — PROGRESS NOTES
Subjective   Chief Complaint   Patient presents with    Gynecologic Exam     Patient here today for annual. Last pap was normal 10/26/21. Last mammo was normal on 11/10/23. Last DEXA was normal 2020.      Radha Wolff is a 62 y.o. year old  menopausal female presenting to be seen for her annual exam.  Overall, the patient reports to be feeling well.  Patient has been working out and eating clean - she has lost 47 pounds.  The patient denies any vaginal bleeding in the past 12 months. Hot flashes and night sweats  were never a problem.    SEXUAL Hx:  She is not sexually active.  Patient and her partner live together but are not sexually active due to his lack of interest.    HEALTH Hx:  She exercises regularly: yes.  The patient reports regular self breast exams: yes  She has noticed changes in height: not recently, but has lost 2 inches since when she was younger.    No Additional Complaints Reported    The following portions of the patient's history were reviewed and updated as appropriate:problem list, current medications, allergies, past family history, past medical history, past social history, and past surgical history.    Social History    Tobacco Use      Smoking status: Never      Smokeless tobacco: Never    Review of Systems   Constitutional:  Negative for activity change and unexpected weight change.   HENT:  Negative for congestion.    Respiratory:  Negative for shortness of breath.    Cardiovascular:  Negative for chest pain.   Gastrointestinal:  Positive for constipation. Negative for abdominal pain, blood in stool and diarrhea.        Colonoscopy normal 2022   Endocrine: Positive for cold intolerance (cold natured, especially since losing weight). Negative for heat intolerance.   Genitourinary:  Negative for difficulty urinating, enuresis (only rare BRET), pelvic pain, vaginal bleeding, vaginal discharge and vaginal pain.   Musculoskeletal:  Negative for arthralgias, back pain,  "neck pain and neck stiffness.   Skin:  Negative for rash.   Neurological:  Positive for headaches (only occasionally). Negative for dizziness.   Psychiatric/Behavioral:  Negative for dysphoric mood and sleep disturbance. The patient is nervous/anxious (only bothers her some days (is a )).          Objective   /76   Ht 165.1 cm (65\")   Wt 72.6 kg (160 lb)   BMI 26.63 kg/m²   Physical Exam  Vitals and nursing note reviewed. Exam conducted with a chaperone present.   Constitutional:       General: She is not in acute distress.     Appearance: She is well-developed.   HENT:      Head: Normocephalic and atraumatic.   Cardiovascular:      Rate and Rhythm: Normal rate and regular rhythm.      Heart sounds: No murmur heard.  Pulmonary:      Effort: Pulmonary effort is normal.      Breath sounds: Normal breath sounds.   Chest:   Breasts:     Right: No inverted nipple or mass.      Left: No inverted nipple or mass.   Abdominal:      General: There is no distension.      Palpations: Abdomen is soft.      Tenderness: There is no abdominal tenderness.   Genitourinary:     General: Normal vulva.      Exam position: Lithotomy position.      Labia:         Right: No tenderness or lesion.         Left: No tenderness or lesion.       Vagina: Normal. No vaginal discharge, tenderness or bleeding.      Cervix: No cervical motion tenderness, discharge or friability.      Adnexa:         Right: No tenderness or fullness.          Left: No tenderness or fullness.        Rectum: Normal.   Musculoskeletal:         General: Normal range of motion.      Cervical back: Normal range of motion and neck supple.   Skin:     General: Skin is warm and dry.   Neurological:      Mental Status: She is alert and oriented to person, place, and time.   Psychiatric:         Mood and Affect: Mood normal.         Behavior: Behavior normal.         Thought Content: Thought content normal.         Judgment: Judgment normal.        "     Assessment & Plan    Diagnoses and all orders for this visit:    1. Women's annual routine gynecological examination (Primary): Exam remarkable for the fact that Jeannie has lost 47 pounds since last year.  The patient is eating healthy and exercising regularly.  She reports regular self breast exam and just recently had a normal mammogram.  The patient's mammogram for next year has already been ordered.  She is due for a bone density study and her DEXA has been ordered.  Routine screening labs with PCP, who is following her lipids and hypertension.  Patient screening colonoscopy is up-to-date.  Pelvic exam unremarkable; Pap smear not due until next year    2. Menopause  -     DEXA Bone Density Axial  -     Mammo Screening Digital Tomosynthesis Bilateral With CAD; Future    3. Non-smoker    4. BMI 26.0-26.9,adult         This note was electronically signed.    Kalani Newsome MD  12/27/2023  17:42 CST

## 2024-01-02 ENCOUNTER — HOSPITAL ENCOUNTER (OUTPATIENT)
Dept: BONE DENSITY | Facility: HOSPITAL | Age: 64
Discharge: HOME | End: 2024-01-02
Admitting: OBSTETRICS & GYNECOLOGY
Payer: COMMERCIAL

## 2024-01-02 PROCEDURE — 77080 DXA BONE DENSITY AXIAL: CPT

## 2024-01-18 RX ORDER — HYDROCHLOROTHIAZIDE 12.5 MG/1
12.5 CAPSULE, GELATIN COATED ORAL DAILY
Qty: 90 CAPSULE | Refills: 1 | Status: SHIPPED | OUTPATIENT
Start: 2024-01-18

## 2024-01-24 DIAGNOSIS — F41.9 ANXIETY: ICD-10-CM

## 2024-01-25 RX ORDER — LORAZEPAM 1 MG/1
1 TABLET ORAL EVERY 8 HOURS PRN
Qty: 90 TABLET | Refills: 0 | Status: SHIPPED | OUTPATIENT
Start: 2024-01-25

## 2024-02-19 ENCOUNTER — HOSPITAL ENCOUNTER (OUTPATIENT)
Dept: CT IMAGING | Facility: HOSPITAL | Age: 64
Discharge: HOME | End: 2024-02-19
Payer: COMMERCIAL

## 2024-02-22 DIAGNOSIS — F41.9 ANXIETY: ICD-10-CM

## 2024-02-23 RX ORDER — LORAZEPAM 1 MG/1
1 TABLET ORAL EVERY 8 HOURS PRN
Qty: 90 TABLET | Refills: 0 | Status: SHIPPED | OUTPATIENT
Start: 2024-02-23

## 2024-03-11 ENCOUNTER — TELEPHONE (OUTPATIENT)
Dept: FAMILY MEDICINE CLINIC | Facility: CLINIC | Age: 64
End: 2024-03-11

## 2024-03-11 RX ORDER — AZITHROMYCIN 250 MG/1
TABLET, FILM COATED ORAL
Qty: 6 TABLET | Refills: 0 | Status: SHIPPED | OUTPATIENT
Start: 2024-03-11

## 2024-03-11 NOTE — TELEPHONE ENCOUNTER
"    Caller: Radha Wolff \"CHANTE\"    Relationship: Self    Best call back number: [phone]     What medication are you requesting: ZPAK OR ANTIBIOTIC     What are your current symptoms: SORE THROAT, STUFFY NOSE, LYMPH NODES SWOLLEN    How long have you been experiencing symptoms: 3 WEEKS    Have you had these symptoms before:    [x] Yes  [] No    Have you been treated for these symptoms before:   [x] Yes  [] No    If a prescription is needed, what is your preferred pharmacy and phone number: [facility] - [place], [place] - [address] [place] [address] [phone] [facility] [phone]      Additional notes: CHANTE WOULD LIKE YOU TO CALL SOMETHING IN FOR HER BECAUSE OF THE SORE THROAT, STUFFY NOSE, LYMPH NODES SWOLLEN AND SINUS PRESSURE          "

## 2024-03-13 ENCOUNTER — TELEPHONE (OUTPATIENT)
Dept: FAMILY MEDICINE CLINIC | Facility: CLINIC | Age: 64
End: 2024-03-13
Payer: COMMERCIAL

## 2024-03-13 NOTE — TELEPHONE ENCOUNTER
"  Caller: Radha Wolff Jeannie \"JEANNIE\"    Relationship: Self    Best call back number: [phone]     What is the best time to reach you: ANYTIME    Who are you requesting to speak with (clinical staff, provider,  specific staff member): CLINICAL    What was the call regarding: THIS PAST MONDAY DR. HERNANDEZ HAD SENT IN A ZPACK FOR PATIENT. PATIENT HAS 2 LEFT TO TAKE AND FEELS BETTER HOWEVER SHE STILL HAS A HOARSE VOICE AND LEFT SIDE OF HER THROAT STILL IS SORE. PATIENT IS ALSO REQUESTING 2 DIFLUCANS BECAUSE SHE IS NOW HAVING A VAGINAL INFECTION    Is it okay if the provider responds through iPowow[employer]: YES OR CALL      [facility] - [place], [place] - [address] [facility] [phone] [facility] [phone] FX   "

## 2024-03-14 RX ORDER — FLUCONAZOLE 100 MG/1
100 TABLET ORAL DAILY
Qty: 3 TABLET | Refills: 0 | Status: SHIPPED | OUTPATIENT
Start: 2024-03-14

## 2024-03-24 DIAGNOSIS — F41.9 ANXIETY: ICD-10-CM

## 2024-03-25 RX ORDER — LORAZEPAM 1 MG/1
1 TABLET ORAL EVERY 8 HOURS PRN
Qty: 90 TABLET | Refills: 0 | Status: SHIPPED | OUTPATIENT
Start: 2024-03-25

## 2024-04-04 ENCOUNTER — TELEPHONE (OUTPATIENT)
Dept: FAMILY MEDICINE CLINIC | Facility: CLINIC | Age: 64
End: 2024-04-04
Payer: COMMERCIAL

## 2024-04-04 DIAGNOSIS — I10 ESSENTIAL HYPERTENSION: Primary | ICD-10-CM

## 2024-04-04 DIAGNOSIS — E78.2 MIXED HYPERLIPIDEMIA: ICD-10-CM

## 2024-04-04 NOTE — TELEPHONE ENCOUNTER
Pt wants to make sure the same labs get ordered for her June apt that she had in December, please advise

## 2024-04-18 RX ORDER — HYDROCHLOROTHIAZIDE 12.5 MG/1
12.5 CAPSULE, GELATIN COATED ORAL DAILY
Qty: 90 CAPSULE | Refills: 1 | Status: SHIPPED | OUTPATIENT
Start: 2024-04-18

## 2024-04-24 DIAGNOSIS — F41.9 ANXIETY: ICD-10-CM

## 2024-04-25 RX ORDER — LORAZEPAM 1 MG/1
1 TABLET ORAL EVERY 8 HOURS PRN
Qty: 90 TABLET | Refills: 0 | Status: SHIPPED | OUTPATIENT
Start: 2024-04-25

## 2024-05-10 ENCOUNTER — TELEPHONE (OUTPATIENT)
Dept: PULMONOLOGY | Facility: CLINIC | Age: 64
End: 2024-05-10
Payer: COMMERCIAL

## 2024-05-23 DIAGNOSIS — F41.9 ANXIETY: ICD-10-CM

## 2024-05-24 RX ORDER — LORAZEPAM 1 MG/1
1 TABLET ORAL EVERY 8 HOURS PRN
Qty: 90 TABLET | Refills: 0 | Status: SHIPPED | OUTPATIENT
Start: 2024-05-24

## 2024-06-19 ENCOUNTER — OFFICE VISIT (OUTPATIENT)
Dept: FAMILY MEDICINE CLINIC | Facility: CLINIC | Age: 64
End: 2024-06-19
Payer: COMMERCIAL

## 2024-06-19 VITALS
DIASTOLIC BLOOD PRESSURE: 60 MMHG | SYSTOLIC BLOOD PRESSURE: 124 MMHG | TEMPERATURE: 98.2 F | OXYGEN SATURATION: 98 % | HEIGHT: 65 IN | HEART RATE: 75 BPM | BODY MASS INDEX: 24.16 KG/M2 | WEIGHT: 145 LBS | RESPIRATION RATE: 18 BRPM

## 2024-06-19 DIAGNOSIS — E78.2 MIXED HYPERLIPIDEMIA: ICD-10-CM

## 2024-06-19 DIAGNOSIS — I10 ESSENTIAL HYPERTENSION: Primary | ICD-10-CM

## 2024-06-19 PROCEDURE — 99213 OFFICE O/P EST LOW 20 MIN: CPT | Performed by: FAMILY MEDICINE

## 2024-06-19 NOTE — PROGRESS NOTES
Subjective   Radha Wolff is a 63 y.o. female.     Chief Complaint   Patient presents with    Hypertension        Hypertension         She notes good bp control without cp or ha--she is frustrated with relationship issues      Current Outpatient Medications:     diphenhydrAMINE (BENADRYL) 25 mg capsule, Take 1 capsule by mouth As Needed., Disp: , Rfl:     Garlic 2 MG capsule, Take  by mouth., Disp: , Rfl:     hydroCHLOROthiazide (MICROZIDE) 12.5 MG capsule, Take 1 capsule by mouth Daily., Disp: 90 capsule, Rfl: 1    LORazepam (ATIVAN) 1 MG tablet, Take 1 tablet by mouth Every 8 (Eight) Hours As Needed for Anxiety., Disp: 90 tablet, Rfl: 0    meclizine (ANTIVERT) 25 MG tablet, Take 1 tablet by mouth 3 (Three) Times a Day As Needed for Dizziness., Disp: 60 tablet, Rfl: 0    melatonin 5 MG tablet tablet, Take 2 tablets by mouth Every Night., Disp: , Rfl:     Omega-3 Fatty Acids (fish oil) 1000 MG capsule capsule, Take  by mouth Daily With Breakfast. 1400 mg qd, Disp: , Rfl:   Allergies   Allergen Reactions    Bee Venom Nausea And Vomiting    Latex Hives     01/18/2005-Latex unspecified      Levofloxacin Delirium    Poison Ivy Extract Hives    Red Dye Itching     06/22/2006-Skin itch      Tetracycline Other (See Comments)     01/18/2005-Tetracycline intolerant      Adhesive Tape Rash    Tegaderm Ag Mesh [Silver] Rash    Wasp Venom Rash       BMI is within normal parameters. No other follow-up for BMI required.      Facility age limit for growth %mohsen is 20 years.    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    HENT: Negative.     Eyes: Negative.    Respiratory: Negative.     Cardiovascular: Negative.    Gastrointestinal: Negative.    Endocrine: Negative.    Genitourinary: Negative.    Musculoskeletal: Negative.    Skin: Negative.    Allergic/Immunologic: Negative.    Neurological: Negative.    Hematological: Negative.    Psychiatric/Behavioral: Negative.         Objective /60   Pulse 75   Temp 98.2 °F (36.8 °C)   Resp 18   Ht 165.1 cm (65\")   Wt 65.8 kg (145 lb)   SpO2 98%   BMI 24.13 kg/m²    Physical Exam  Vitals and nursing note reviewed.   Constitutional:       Appearance: Normal appearance.   HENT:      Head: Normocephalic and atraumatic.      Nose: Nose normal.      Mouth/Throat:      Mouth: Mucous membranes are moist.   Eyes:      Pupils: Pupils are equal, round, and reactive to light.   Cardiovascular:      Rate and Rhythm: Normal rate and regular rhythm.      Pulses: Normal pulses.   Pulmonary:      Effort: Pulmonary effort is normal.   Abdominal:      General: Abdomen is flat.   Musculoskeletal:         General: Normal range of motion.      Cervical back: Normal range of motion and neck supple.   Skin:     General: Skin is warm.      Capillary Refill: Capillary refill takes less than 2 seconds.   Neurological:      General: No focal deficit present.      Mental Status: She is alert.   Psychiatric:         Mood and Affect: Mood normal.         Assessment & Plan   Diagnoses and all orders for this visit:    1. Essential hypertension (Primary)  -     Cortisol - AM    2. Mixed hyperlipidemia  -     Cortisol - AM      She declines tx " forlipids todayh           Orders Placed This Encounter   Procedures    Cortisol - AM     Order Specific Question:   Release to patient     Answer:   Routine Release [[id number]]       Follow up: 6 month(s)

## 2024-06-20 LAB — CORTIS AM PEAK SERPL-MCNC: 14.1 UG/DL (ref 6.2–19.4)

## 2024-06-23 DIAGNOSIS — F41.9 ANXIETY: ICD-10-CM

## 2024-06-24 RX ORDER — LORAZEPAM 1 MG/1
1 TABLET ORAL EVERY 8 HOURS PRN
Qty: 90 TABLET | Refills: 0 | Status: SHIPPED | OUTPATIENT
Start: 2024-06-24

## 2024-07-17 ENCOUNTER — OFFICE VISIT (OUTPATIENT)
Dept: OBSTETRICS AND GYNECOLOGY | Age: 64
End: 2024-07-17
Payer: COMMERCIAL

## 2024-07-17 VITALS
WEIGHT: 150 LBS | SYSTOLIC BLOOD PRESSURE: 108 MMHG | HEIGHT: 65 IN | DIASTOLIC BLOOD PRESSURE: 70 MMHG | BODY MASS INDEX: 24.99 KG/M2

## 2024-07-17 DIAGNOSIS — N93.9 VAGINAL BLEEDING: ICD-10-CM

## 2024-07-17 DIAGNOSIS — R10.9 ABDOMINAL CRAMPING: Primary | ICD-10-CM

## 2024-07-17 RX ORDER — UBIDECARENONE 100 MG
100 CAPSULE ORAL DAILY
COMMUNITY

## 2024-07-17 RX ORDER — FOLIC ACID/MULTIVIT,IRON,MINER 0.4MG-18MG
TABLET ORAL
COMMUNITY
Start: 2023-12-27

## 2024-07-17 NOTE — PROGRESS NOTES
"Chief Complaint  Gynecologic Exam (Pt is here with c/o noticing \"old dirty blood\" when she wiped yesterday.  This happened one other time a few weeks ago where there was a little more than this time.  Each time, patient has felt crampy, almost as if she is going to start a period.  Pt has no other complaints today.  )  History of Present Illness  The patient is a 62-year-old female who presents for evaluation of vaginal bleeding.    The patient is not currently on any hormone replacement therapy.   Approximately 2 to 3 weeks ago, she observed a dark, non-spotting blood on the toilet tissue after using the bathroom.   The bleeding was minimal compared to its severity 2 to 3 weeks ago.   She has no history of childbirth and is uncertain about vaginal dryness.   She reports no discomfort in her labial and vaginal tissue, but experiences intermittent white discharge.   She is not sexually active and denies any dysuria.   She reports no tenderness in the labial region when the soap is touched.   She has a history of traces of blood in her urine.   She has undergone two dilations for her kidneys.   She reports significant weight loss.   She has been experiencing constipation since her gallbladder removal, but denies any post-defecation bleeding.    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     Subjective          Radha Wolff presents to [facility]  History of Present Illness    Review of Systems   Constitutional:  Negative for activity change, appetite change, fatigue and fever.   Respiratory:  Negative for apnea and shortness of breath.    Cardiovascular:  Negative for chest pain and palpitations.   Gastrointestinal:  Negative for abdominal distention, abdominal pain, constipation, diarrhea, nausea and vomiting.   Endocrine: Negative for cold intolerance and heat intolerance.   Genitourinary:  Negative for menstrual problem.        Cramping then old dirty blood when wiping.    " "  Neurological:  Negative for headaches.   Psychiatric/Behavioral:  Negative for agitation and sleep disturbance.          Objective   Vital Signs:   /70   Ht 165.1 cm (65\")   Wt 68 kg (150 lb)   BMI 24.96 kg/m²     Physical Exam  Constitutional:       Appearance: She is well-developed.   HENT:      Head: Normocephalic.   Neck:      Trachea: No tracheal deviation.   Cardiovascular:      Rate and Rhythm: Normal rate.   Pulmonary:      Breath sounds: Normal breath sounds. No wheezing.   Abdominal:      Palpations: Abdomen is soft.      Tenderness: There is no abdominal tenderness.   Genitourinary:     Labia:         Right: No rash, tenderness or lesion.         Left: No rash, tenderness or lesion.       Vagina: No vaginal discharge, erythema or tenderness.      Cervix: No cervical motion tenderness or discharge.      Uterus: Not deviated, not enlarged and not tender.       Adnexa:         Right: No mass, tenderness or fullness.          Left: No mass, tenderness or fullness.        Rectum: Normal.      Comments: Atrophic vaginal tissue  Skin:     General: Skin is warm and dry.      Findings: No rash.   Neurological:      Mental Status: She is alert and oriented to person, place, and time.   Psychiatric:         Speech: Speech normal.         Behavior: Behavior normal.       Result Review :   The following data was reviewed by: EILEEN Alcaraz on 07/17/2024:  OBGYN Diagnostics Review:   Lab Results   Component Value Date    CASEREPORT  02/18/2022     Surgical Pathology Report                         Case: [id number]                                  Authorizing Provider:  Bharat Vallejo MD      Collected:           02/18/2022 01:23 PM          Ordering Location:     [facility]     Received:            02/18/2022 02:35 PM                                 ENDOSCOPY                                                                    Pathologist:           Isabella Rowe MD               "                                          Specimens:   1) - Large Intestine, polyp at cecum                                                                2) - Large Intestine, polyp at rectal/ sigmoid junction                                    INTERPGYN Negative for intraepithelial lesion or malignancy 10/26/2021    GENCAT Within normal limits 10/26/2021    SPECADGYN Satisfactory for evaluation 10/26/2021    ADDINFO  10/26/2021     Disclaimer: Cervical cytology is a screening test primarily for squamous cancer and its precursors and has associated false-negative and false-positive results.  Technologies such as liquid-based preparations may decrease but will not eliminate all false-negative results.  Follow-up of unexplained clinical signs and symptoms is recommended to minimize false-negative results. (The Andrews System for Reporting Cervical Cytology: John, 2015).        HPV Aptima   Date Value Ref Range Status   10/26/2021 Not Detected Not Detected Final      Data reviewed : Radiologic studies transvaginal US    Impression:     1.  Uterus: Normal size and Retroverted     2.  Endometrium:  2.7 mm      3.  Myometrium:  2 small fibroids present measuring 9 mm and 8 mm each     4.  Ovaries  Left:    Normal/unremarkable   Right:  Normal/unremarkable         Relevant comparison data: No relevant comparison data     Chong Be MD  7/17/2024 13:35 CDT             Assessment and Plan        Assessment & Plan  1. Vaginal bleeding.  The patient's menopausal status is evident, as evidenced by an absence of menstruation for over a year.   The uterine lining, which is slightly over 2.5 mm, does not exhibit excessive thickness.   A few small fibroids are also present, with one measuring approximately the same size as before.   A pelvic examination will be conducted to identify any potential tissue-related bleeding.   A urine sample will be collected for analysis.  The patient is advised to monitor for any  bleeding episodes in the interim.   The use of a pea-sized amount of coconut oil in the vagina daily is recommended.    Follow-up  A follow-up visit is scheduled for 1 month from now.    Diagnoses and all orders for this visit:    1. Abdominal cramping (Primary)  -     Urinalysis With Microscopic If Indicated (No Culture) - Urine, Clean Catch  -     Urine Culture - Urine, Urine, Random Void    2. Vaginal bleeding          BMI is within normal parameters. No other follow-up for BMI required.       Follow Up   Return in about 1 month (around 8/17/2024) for US and OV.    Patient was given instructions and counseling regarding her condition or for health maintenance advice. Please see specific information pulled into the AVS if appropriate.       Patient or patient representative verbalized consent for the use of Ambient Listening during the visit with  EILEEN lAcaraz for chart documentation. 7/28/2024  22:22 CDT

## 2024-07-18 RX ORDER — HYDROCHLOROTHIAZIDE 12.5 MG/1
12.5 CAPSULE, GELATIN COATED ORAL DAILY
Qty: 90 CAPSULE | Refills: 1 | Status: SHIPPED | OUTPATIENT
Start: 2024-07-18

## 2024-07-18 NOTE — TELEPHONE ENCOUNTER
Rx Refill Note  Requested Prescriptions     Pending Prescriptions Disp Refills    hydroCHLOROthiazide (MICROZIDE) 12.5 MG capsule 90 capsule 1     Sig: Take 1 capsule by mouth Daily.      Last office visit with prescribing clinician: 6/19/2024   Last telemedicine visit with prescribing clinician: Visit date not found   Next office visit with prescribing clinician: Visit date not found                         Would you like a call back once the refill request has been completed: [] Yes [x] No    If the office needs to give you a call back, can they leave a voicemail: [] Yes [x] No    Ana Hyman MA  07/18/24, 06:58 CDT

## 2024-07-19 LAB
BACTERIA UR CULT: NO GROWTH
BACTERIA UR CULT: NORMAL

## 2024-07-23 DIAGNOSIS — F41.9 ANXIETY: ICD-10-CM

## 2024-07-24 RX ORDER — LORAZEPAM 1 MG/1
1 TABLET ORAL EVERY 8 HOURS PRN
Qty: 90 TABLET | Refills: 0 | Status: SHIPPED | OUTPATIENT
Start: 2024-07-24

## 2024-07-24 NOTE — TELEPHONE ENCOUNTER
Rx Refill Note  Requested Prescriptions     Pending Prescriptions Disp Refills    LORazepam (ATIVAN) 1 MG tablet 90 tablet 0     Sig: Take 1 tablet by mouth Every 8 (Eight) Hours As Needed for Anxiety.      Last office visit with office: 06/19/24  Next office visit with office: 12/20/24    UDS: none    DATE OF LAST REFILL: 06/27/24    Controlled Substance Agreement: not up to date    [place]         {TIP  Is Refill Pharmacy correct?:  Liset Ramírez MA  07/24/24, 08:28 CDT

## 2024-07-29 NOTE — PATIENT INSTRUCTIONS

## 2024-08-21 DIAGNOSIS — F41.9 ANXIETY: ICD-10-CM

## 2024-08-22 RX ORDER — LORAZEPAM 1 MG/1
1 TABLET ORAL EVERY 8 HOURS PRN
Qty: 90 TABLET | Refills: 0 | Status: SHIPPED | OUTPATIENT
Start: 2024-08-22

## 2024-08-22 NOTE — TELEPHONE ENCOUNTER
Rx Refill Note  Requested Prescriptions     Pending Prescriptions Disp Refills    LORazepam (ATIVAN) 1 MG tablet 90 tablet 0     Sig: Take 1 tablet by mouth Every 8 (Eight) Hours As Needed for Anxiety.      Last office visit with office: 06/19/24  Next office visit with office: 12/20/24    UDS: none    DATE OF LAST REFILL: 07/24/24    Controlled Substance Agreement: not up to date    [place]         {TIP  Is Refill Pharmacy correct?:  Liset Ramírez MA  08/22/24, 08:32 CDT

## 2024-08-27 ENCOUNTER — PATIENT MESSAGE (OUTPATIENT)
Dept: FAMILY MEDICINE CLINIC | Facility: CLINIC | Age: 64
End: 2024-08-27
Payer: COMMERCIAL

## 2024-08-27 ENCOUNTER — TELEPHONE (OUTPATIENT)
Dept: OBSTETRICS AND GYNECOLOGY | Age: 64
End: 2024-08-27

## 2024-08-27 RX ORDER — FLUCONAZOLE 150 MG/1
150 TABLET ORAL
Qty: 3 TABLET | Refills: 0 | Status: SHIPPED | OUTPATIENT
Start: 2024-08-27

## 2024-08-27 NOTE — TELEPHONE ENCOUNTER
From: Radha Wolff  To: Jean Haddad  Sent: 8/27/2024 8:55 AM CDT  Subject: Vaginal infection    I need 2 pills for Diflucan. I'm feeling a vaginal infection starting.

## 2024-08-29 ENCOUNTER — OFFICE VISIT (OUTPATIENT)
Dept: OBSTETRICS AND GYNECOLOGY | Age: 64
End: 2024-08-29
Payer: COMMERCIAL

## 2024-08-29 VITALS
HEIGHT: 65 IN | DIASTOLIC BLOOD PRESSURE: 84 MMHG | WEIGHT: 151 LBS | SYSTOLIC BLOOD PRESSURE: 114 MMHG | BODY MASS INDEX: 25.16 KG/M2

## 2024-08-29 DIAGNOSIS — N89.8 VAGINAL IRRITATION: Primary | ICD-10-CM

## 2024-08-29 DIAGNOSIS — Z72.51 UNPROTECTED SEXUAL INTERCOURSE: ICD-10-CM

## 2024-08-29 LAB
B-HCG UR QL: NEGATIVE
EXPIRATION DATE: NORMAL
INTERNAL NEGATIVE CONTROL: NEGATIVE
INTERNAL POSITIVE CONTROL: POSITIVE
Lab: NORMAL

## 2024-08-29 PROCEDURE — 81025 URINE PREGNANCY TEST: CPT | Performed by: NURSE PRACTITIONER

## 2024-08-29 PROCEDURE — 99213 OFFICE O/P EST LOW 20 MIN: CPT | Performed by: NURSE PRACTITIONER

## 2024-08-29 PROCEDURE — 99459 PELVIC EXAMINATION: CPT | Performed by: NURSE PRACTITIONER

## 2024-08-29 NOTE — PROGRESS NOTES
"Chief Complaint  Gynecologic Exam (Pt is here for a f/u with US to recheck uterine lining and small fibroids that were present on US from 7/17/24.  Pt had intercourse for the first time in several years and feels she now has a vaginal infection.  C/o bloating, and vaginal itching.  Denies any odor or discharge.  Pt was given diflucan for this by her family  For this and feels it has helped. )  History of Present Illness  The patient is a 63-year-old female who presents for evaluation of vaginal bleeding.    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 She reports no bleeding during intercourse but did notice minor tears. She also mentions a possible yeast or bacterial infection, but reports no discharge or odor. She began taking Diflucan on Monday and has one remaining dose.    She has a history of fibroids and is scheduled for a Pap smear in December 2024.    She had unprotected intercourse over the weekend and wishes to confirm that she is not pregnant.    Subjective          Radha Wolff presents to [facility]  History of Present Illness    Review of Systems   Genitourinary:         Vaginal irritation  No further bleeding         Objective   Vital Signs:   /84   Ht 165.1 cm (65\")   Wt 68.5 kg (151 lb)   BMI 25.13 kg/m²     Physical Exam  Exam conducted with a chaperone present.   Constitutional:       Appearance: She is well-developed.   HENT:      Head: Normocephalic.   Neck:      Trachea: No tracheal deviation.   Cardiovascular:      Rate and Rhythm: Normal rate.   Pulmonary:      Breath sounds: Normal breath sounds. No wheezing.   Abdominal:      Palpations: Abdomen is soft.      Tenderness: There is no abdominal tenderness.   Genitourinary:     Exam position: Lithotomy position.      Labia:         Right: No rash, tenderness or lesion.         Left: No rash, " tenderness or lesion.       Vagina: No vaginal discharge, erythema or tenderness.      Cervix: No cervical motion tenderness or discharge.      Uterus: Not deviated, not enlarged and not tender.       Adnexa:         Right: No mass, tenderness or fullness.          Left: No mass, tenderness or fullness.        Rectum: Normal.      Comments: Atrophic vaginal tissue    Skin:     General: Skin is warm and dry.      Findings: No rash.   Neurological:      Mental Status: She is alert and oriented to person, place, and time.   Psychiatric:         Speech: Speech normal.         Behavior: Behavior normal.       Physical Exam      Result Review :   The following data was reviewed by: EILEEN Alcaraz on 08/29/2024:  OBGYN Diagnostics Review:   Lab Results   Component Value Date    CASEREPORT  02/18/2022     Surgical Pathology Report                         Case: [id number]                                  Authorizing Provider:  Bharat Vallejo MD      Collected:           02/18/2022 01:23 PM          Ordering Location:     [facility]     Received:            02/18/2022 02:35 PM                                 ENDOSCOPY                                                                    Pathologist:           Isabella Rowe MD                                                        Specimens:   1) - Large Intestine, polyp at cecum                                                                2) - Large Intestine, polyp at rectal/ sigmoid junction                                    INTERPGYN Negative for intraepithelial lesion or malignancy 10/26/2021    GENCAT Within normal limits 10/26/2021    SPECADGYN Satisfactory for evaluation 10/26/2021    ADDINFO  10/26/2021     Disclaimer: Cervical cytology is a screening test primarily for squamous cancer and its precursors and has associated false-negative and false-positive results.  Technologies such as liquid-based preparations may decrease but will  not eliminate all false-negative results.  Follow-up of unexplained clinical signs and symptoms is recommended to minimize false-negative results. (The [employer] System for Reporting Cervical Cytology: John, 2015).        HPV Aptima   Date Value Ref Range Status   10/26/2021 Not Detected Not Detected Final      HCG, Urine, QL   Date Value Ref Range Status   08/29/2024 Negative Negative Final      Data reviewed : Radiologic studies transvaginal US      Impression:     1.  Uterus: Normal size, with uterine dimensions 5.1 x 4.1 x 2.9 cm, and Retroverted     2.  Endometrium:  2.7 mm      3.  Myometrium:  intramural posterior leiomyoma, measures 1 x 0.8 cm . Compared to prior ultrasound, relatively unchanged fibroid size and appearance. The second fibroid from last ultrasound is not visible today.      4.  Ovaries  Left:    Normal/unremarkable , measures 1.9 x 1.6 x 1.2 cm  Right:  Normal/unremarkable , measures 2 x 1.6 x 2.1 cm        Relevant comparison data: Comparison is made with previous pelvic ultrasound      Jm Melendez MD  8/29/2024 09:55 CDT      Current Outpatient Medications on File Prior to Visit   Medication Sig    coenzyme Q10 100 MG capsule Take 1 capsule by mouth Daily.    Cyanocobalamin (Vitamin B12) 1000 MCG tablet controlled-release     diphenhydrAMINE (BENADRYL) 25 mg capsule Take 1 capsule by mouth As Needed.    fluconazole (DIFLUCAN) 150 MG tablet Take 1 tablet by mouth Every 3 (Three) Days.    Garlic 2 MG capsule Take  by mouth.    hydroCHLOROthiazide (MICROZIDE) 12.5 MG capsule Take 1 capsule by mouth Daily.    Krill Oil 350 MG capsule     LORazepam (ATIVAN) 1 MG tablet Take 1 tablet by mouth Every 8 (Eight) Hours As Needed for Anxiety.    meclizine (ANTIVERT) 25 MG tablet Take 1 tablet by mouth 3 (Three) Times a Day As Needed for Dizziness.    melatonin 5 MG tablet tablet Take 2 tablets by mouth Every Night.    Omega-3 Fatty Acids (fish oil) 1000 MG capsule capsule Take  by mouth Daily  With Breakfast. 1400 mg qd    TURMERIC PO Take  by mouth.     No current facility-administered medications on file prior to visit.          Assessment and Plan    Assessment & Plan  1. Postmenopausal bleeding.  She reports no bleeding, spotting, or brown discharge since her last visit. However, she experienced minor tears during recent intercourse, likely due to dryness. Brown discharge is noted, indicating older blood. A pelvic exam will be performed today to check for any tissue disruption, urethral prolapse, or other sources of bleeding. An endometrial biopsy was not recommended previously due to a thin uterine lining. Pt to have repeat US and OV with Dr. Newsome in approx 3 months.     2. Suspected yeast infection/vaginal dryness  She reports itching but no discharge or odor. She started taking Diflucan on Monday and will take the last pill today.   She experienced minor tears during intercourse, likely due to dryness. Astroglide is recommended as a lubricant.     3. Pregnancy concern.  Despite being postmenopausal, she had unprotected sex recently and is concerned about pregnancy.  UPT negative        Diagnoses and all orders for this visit:    1. Vaginal irritation (Primary)  -     Genital Mycoplasmas RITA, Swab - Swab, Vagina  -     NuSwab VG+ - Swab, Vagina    2. Unprotected sexual intercourse  -     POC Pregnancy, Urine          BMI is >= 25 and <30. (Overweight) The following options were offered after discussion;: weight loss educational material (shared in after visit summary) and information on healthy weight added to patient's after visit summary        Follow Up   Return for Annual physical.    Patient was given instructions and counseling regarding her condition or for health maintenance advice. Please see specific information pulled into the AVS if appropriate.       Patient or patient representative verbalized consent for the use of Ambient Listening during the visit with  EILEEN Alcaraz  for chart documentation. 9/10/2024  17:28 CDT

## 2024-09-03 RX ORDER — FLUCONAZOLE 150 MG/1
150 TABLET ORAL
Qty: 3 TABLET | Refills: 0 | OUTPATIENT
Start: 2024-09-03

## 2024-09-05 LAB
A VAGINAE DNA VAG QL NAA+PROBE: NORMAL SCORE
BVAB2 DNA VAG QL NAA+PROBE: NORMAL SCORE
C ALBICANS DNA VAG QL NAA+PROBE: NEGATIVE
C GLABRATA DNA VAG QL NAA+PROBE: NEGATIVE
C TRACH DNA SPEC QL NAA+PROBE: NEGATIVE
M GENITALIUM DNA SPEC QL NAA+PROBE: NEGATIVE
M HOMINIS DNA SPEC QL NAA+PROBE: NEGATIVE
MEGA1 DNA VAG QL NAA+PROBE: NORMAL SCORE
N GONORRHOEA DNA VAG QL NAA+PROBE: NEGATIVE
T VAGINALIS DNA VAG QL NAA+PROBE: NEGATIVE
UREAPLASMA DNA SPEC QL NAA+PROBE: NEGATIVE

## 2024-09-10 NOTE — PATIENT INSTRUCTIONS

## 2024-09-23 DIAGNOSIS — F41.9 ANXIETY: ICD-10-CM

## 2024-09-23 RX ORDER — LORAZEPAM 1 MG/1
1 TABLET ORAL EVERY 8 HOURS PRN
Qty: 90 TABLET | Refills: 0 | Status: SHIPPED | OUTPATIENT
Start: 2024-09-23

## 2024-10-15 LAB
NCCN CRITERIA FLAG: NORMAL
TYRER CUZICK SCORE: 9.8

## 2024-10-15 RX ORDER — HYDROCHLOROTHIAZIDE 12.5 MG/1
12.5 CAPSULE ORAL DAILY
Qty: 90 CAPSULE | Refills: 1 | Status: SHIPPED | OUTPATIENT
Start: 2024-10-15

## 2024-10-21 DIAGNOSIS — F41.9 ANXIETY: ICD-10-CM

## 2024-10-22 RX ORDER — LORAZEPAM 1 MG/1
1 TABLET ORAL EVERY 8 HOURS PRN
Qty: 90 TABLET | Refills: 0 | Status: SHIPPED | OUTPATIENT
Start: 2024-10-22

## 2024-10-22 NOTE — TELEPHONE ENCOUNTER
Rx Refill Note  Requested Prescriptions     Pending Prescriptions Disp Refills    LORazepam (ATIVAN) 1 MG tablet 90 tablet 0     Sig: Take 1 tablet by mouth Every 8 (Eight) Hours As Needed for Anxiety.      Last office visit with office: 06-19-24  Next office visit with office: 12/20/24    UDS: none    DATE OF LAST REFILL: 09-23-24    Controlled Substance Agreement: not up to date    [place]24         {TIP  Is Refill Pharmacy correct?:  Liset Ramírez MA  10/22/24, 10:55 CDT

## 2024-11-11 ENCOUNTER — HOSPITAL ENCOUNTER (OUTPATIENT)
Dept: MAMMOGRAPHY | Facility: HOSPITAL | Age: 64
Discharge: HOME | End: 2024-11-11
Admitting: OBSTETRICS & GYNECOLOGY
Payer: COMMERCIAL

## 2024-11-11 DIAGNOSIS — Z78.0 MENOPAUSE: ICD-10-CM

## 2024-11-11 DIAGNOSIS — Z12.31 BREAST CANCER SCREENING BY MAMMOGRAM: Primary | ICD-10-CM

## 2024-11-11 PROCEDURE — 77063 BREAST TOMOSYNTHESIS BI: CPT

## 2024-11-11 PROCEDURE — 77067 SCR MAMMO BI INCL CAD: CPT

## 2024-11-12 RX ORDER — FLUCONAZOLE 150 MG/1
150 TABLET ORAL
Qty: 3 TABLET | Refills: 0 | Status: SHIPPED | OUTPATIENT
Start: 2024-11-12

## 2024-11-20 DIAGNOSIS — F41.9 ANXIETY: ICD-10-CM

## 2024-11-21 RX ORDER — LORAZEPAM 1 MG/1
1 TABLET ORAL EVERY 8 HOURS PRN
Qty: 90 TABLET | Refills: 0 | Status: SHIPPED | OUTPATIENT
Start: 2024-11-21

## 2024-11-21 NOTE — TELEPHONE ENCOUNTER
Rx Refill Note  Requested Prescriptions     Pending Prescriptions Disp Refills    LORazepam (ATIVAN) 1 MG tablet 90 tablet 0     Sig: Take 1 tablet by mouth Every 8 (Eight) Hours As Needed for Anxiety.      Last office visit with office: 06/19/2024  Next office visit with office: 12/20/2024    UDS: NEED    DATE OF LAST REFILL: 10/22/2024    Controlled Substance Agreement: not up to date    [place]: WNL         {TIP  Is Refill Pharmacy correct?:  Cathy Sim MA  11/21/24, 08:03 CST

## 2024-11-27 ENCOUNTER — OFFICE VISIT (OUTPATIENT)
Dept: FAMILY MEDICINE CLINIC | Facility: CLINIC | Age: 64
End: 2024-11-27
Payer: COMMERCIAL

## 2024-11-27 VITALS
WEIGHT: 155 LBS | BODY MASS INDEX: 25.83 KG/M2 | OXYGEN SATURATION: 97 % | HEIGHT: 65 IN | HEART RATE: 71 BPM | DIASTOLIC BLOOD PRESSURE: 58 MMHG | SYSTOLIC BLOOD PRESSURE: 124 MMHG

## 2024-11-27 DIAGNOSIS — I10 ESSENTIAL HYPERTENSION: ICD-10-CM

## 2024-11-27 DIAGNOSIS — E78.2 MIXED HYPERLIPIDEMIA: ICD-10-CM

## 2024-11-27 DIAGNOSIS — J40 BRONCHITIS: Primary | ICD-10-CM

## 2024-11-27 PROCEDURE — 99214 OFFICE O/P EST MOD 30 MIN: CPT | Performed by: NURSE PRACTITIONER

## 2024-11-27 RX ORDER — METHYLPREDNISOLONE 4 MG/1
TABLET ORAL
Qty: 1 EACH | Refills: 0 | Status: SHIPPED | OUTPATIENT
Start: 2024-11-27

## 2024-11-27 RX ORDER — FLUCONAZOLE 150 MG/1
150 TABLET ORAL
Qty: 3 TABLET | Refills: 0 | Status: SHIPPED | OUTPATIENT
Start: 2024-11-27

## 2024-11-27 NOTE — PROGRESS NOTES
Subjective   Chief Complaint:  Cough and congestion    History of Present Illness  This is a 63-year-old female presents today with cough and respiratory congestion x 3 weeks.  Reports no fevers.  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  She advises green sputum present.  She has a history of high blood pressure and hyperlipidemia.  She inquires about getting a lab order prior to her next visit.    Past Medical, Surgical, Social, and Family History:  Allergies   Allergen Reactions    Bee Venom Nausea And Vomiting    Latex Hives     01/18/2005-Latex unspecified      Levofloxacin Delirium    Poison Ivy Extract Hives    Red Dye #40 (Allura Red) Itching     06/22/2006-Skin itch      Tetracycline Other (See Comments)     01/18/2005-Tetracycline intolerant      Adhesive Tape Rash    Tegaderm Ag Mesh [Silver] Rash    Wasp Venom Rash      Past Medical History:   Diagnosis Date    Anxiety     Arthritis     Depression Sometimes    Dizzy spells     Elevated cholesterol     Endometriosis  told me my endometrial tissue was thickening    Hematuria     Hypertension     Migraine Headaches frequently--not migraines    Osteoarthritis     Pancreatitis     2007    PMB (postmenopausal bleeding)     PONV (postoperative nausea and vomiting) Throw ip    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     Urinary tract infection Throughout the year    Varicella Have had Shingles Shot      Past Surgical History:   Procedure Laterality Date    ADENOIDECTOMY      BILATERAL INSERTION OF EAR TUBES AND ADENOIDECTOMY      COLONOSCOPY N/A 02/18/2022    Procedure: COLONOSCOPY WITH ANESTHESIA;  Surgeon: Bharat Vallejo MD;  Location: [facility] ENDOSCOPY;  Service: Gastroenterology;  Laterality: N/A;  pre screen  post; polyps   Daren Zazueta MD    LAPAROSCOPIC CHOLECYSTECTOMY      SKIN LESION EXCISION Left     SCC of the Left upper arm    TONSILLECTOMY      LENNY  "TOOTH EXTRACTION        Social History     Socioeconomic History    Marital status: Single   Tobacco Use    Smoking status: Never     Passive exposure: Past    Smokeless tobacco: Never   Vaping Use    Vaping status: Never Used   Substance and Sexual Activity    Alcohol use: Never    Drug use: Never    Sexual activity: Not Currently     Partners: Male     Birth control/protection: None      Family History   Problem Relation Age of Onset    Lung cancer Father     Cancer Father         Lung    Lung cancer Mother     Liver cancer Mother     Cancer Mother         Lung/Liver    Obesity Brother     Heart disease Brother         Stents put in/Obesily over weight    Arthritis Brother     No Known Problems Sister     No Known Problems Son     No Known Problems Daughter     No Known Problems Paternal Grandmother     No Known Problems Maternal Grandmother     No Known Problems Maternal Aunt     No Known Problems Paternal Aunt     No Known Problems Other     BRCA 1/2 Neg Hx     Breast cancer Neg Hx     Colon cancer Neg Hx     Endometrial cancer Neg Hx     Ovarian cancer Neg Hx     Colon polyps Neg Hx     Esophageal cancer Neg Hx     Uterine cancer Neg Hx     Melanoma Neg Hx        Objective   Vital Signs  /58   Pulse 71   Ht 165.1 cm (65\")   Wt 70.3 kg (155 lb)   SpO2 97%   BMI 25.79 kg/m²    Physical Exam  Vitals reviewed.   Constitutional:       General: She is not in acute distress.     Appearance: Normal appearance.   HENT:      Right Ear: Tympanic membrane and ear canal normal.      Left Ear: Tympanic membrane and ear canal normal.   Cardiovascular:      Rate and Rhythm: Normal rate and regular rhythm.   Pulmonary:      Effort: Pulmonary effort is normal.      Breath sounds: Wheezing (*Lower left bronchial) present.       Assessment & Plan   Assessment & Plan  1.  Bronchitis  -Augmentin 875 mg p.o. twice daily x 10 days  -Medrol Dosepak    2.  Essential hypertension  -CBC, CMP    3.  Mixed " hyperlipidemia  -Lipids, TSH    Follow-up:  The patient will Return for follow-up as needed.    Records and Results Reviewed:  I reviewed current medications as given by patient and allergy list    : Hybrid HIRO Co- and Dragon Speech Recognition - No recording technology was used in the exam room during encounter.    Electronically signed by EILEEN Thompson, 11/27/24, 4:14 PM CST.

## 2024-12-12 ENCOUNTER — TELEPHONE (OUTPATIENT)
Dept: FAMILY MEDICINE CLINIC | Facility: CLINIC | Age: 64
End: 2024-12-12
Payer: COMMERCIAL

## 2024-12-12 DIAGNOSIS — E78.2 MIXED HYPERLIPIDEMIA: Primary | ICD-10-CM

## 2024-12-12 NOTE — TELEPHONE ENCOUNTER
Added on lipase amylase.  Already had CMP ordered.    Electronically signed by EILEEN Thompson, 12/12/24, 6:52 AM CST.

## 2024-12-14 LAB
ALBUMIN SERPL-MCNC: 4.4 G/DL (ref 3.9–4.9)
ALP SERPL-CCNC: 58 IU/L (ref 44–121)
ALT SERPL-CCNC: 14 IU/L (ref 0–32)
AMYLASE SERPL-CCNC: 46 U/L (ref 31–110)
AST SERPL-CCNC: 24 IU/L (ref 0–40)
BASOPHILS # BLD AUTO: 0 X10E3/UL (ref 0–0.2)
BASOPHILS NFR BLD AUTO: 1 %
BILIRUB SERPL-MCNC: 0.6 MG/DL (ref 0–1.2)
BUN SERPL-MCNC: 18 MG/DL (ref 8–27)
BUN/CREAT SERPL: 24 (ref 12–28)
CALCIUM SERPL-MCNC: 9.9 MG/DL (ref 8.7–10.3)
CHLORIDE SERPL-SCNC: 102 MMOL/L (ref 96–106)
CHOLEST SERPL-MCNC: 223 MG/DL (ref 100–199)
CO2 SERPL-SCNC: 25 MMOL/L (ref 20–29)
CREAT SERPL-MCNC: 0.74 MG/DL (ref 0.57–1)
EGFRCR SERPLBLD CKD-EPI 2021: 92 ML/MIN/1.73
EOSINOPHIL # BLD AUTO: 0.2 X10E3/UL (ref 0–0.4)
EOSINOPHIL NFR BLD AUTO: 2 %
ERYTHROCYTE [DISTWIDTH] IN BLOOD BY AUTOMATED COUNT: 12.5 % (ref 11.7–15.4)
GLOBULIN SER CALC-MCNC: 1.9 G/DL (ref 1.5–4.5)
GLUCOSE SERPL-MCNC: 92 MG/DL (ref 70–99)
HCT VFR BLD AUTO: 41.1 % (ref 34–46.6)
HDLC SERPL-MCNC: 95 MG/DL
HGB BLD-MCNC: 13.4 G/DL (ref 11.1–15.9)
IMM GRANULOCYTES # BLD AUTO: 0 X10E3/UL (ref 0–0.1)
IMM GRANULOCYTES NFR BLD AUTO: 0 %
LDLC SERPL CALC-MCNC: 119 MG/DL (ref 0–99)
LIPASE SERPL-CCNC: 29 U/L (ref 14–72)
LYMPHOCYTES # BLD AUTO: 2.9 X10E3/UL (ref 0.7–3.1)
LYMPHOCYTES NFR BLD AUTO: 39 %
MCH RBC QN AUTO: 30.4 PG (ref 26.6–33)
MCHC RBC AUTO-ENTMCNC: 32.6 G/DL (ref 31.5–35.7)
MCV RBC AUTO: 93 FL (ref 79–97)
MONOCYTES # BLD AUTO: 0.5 X10E3/UL (ref 0.1–0.9)
MONOCYTES NFR BLD AUTO: 7 %
NEUTROPHILS # BLD AUTO: 3.8 X10E3/UL (ref 1.4–7)
NEUTROPHILS NFR BLD AUTO: 51 %
PLATELET # BLD AUTO: 359 X10E3/UL (ref 150–450)
POTASSIUM SERPL-SCNC: 4.1 MMOL/L (ref 3.5–5.2)
PROT SERPL-MCNC: 6.3 G/DL (ref 6–8.5)
RBC # BLD AUTO: 4.41 X10E6/UL (ref 3.77–5.28)
SODIUM SERPL-SCNC: 141 MMOL/L (ref 134–144)
TRIGL SERPL-MCNC: 54 MG/DL (ref 0–149)
TSH SERPL DL<=0.005 MIU/L-ACNC: 1.53 UIU/ML (ref 0.45–4.5)
VLDLC SERPL CALC-MCNC: 9 MG/DL (ref 5–40)
WBC # BLD AUTO: 7.5 X10E3/UL (ref 3.4–10.8)

## 2024-12-20 ENCOUNTER — OFFICE VISIT (OUTPATIENT)
Dept: FAMILY MEDICINE CLINIC | Facility: CLINIC | Age: 64
End: 2024-12-20
Payer: COMMERCIAL

## 2024-12-20 VITALS
SYSTOLIC BLOOD PRESSURE: 130 MMHG | HEIGHT: 65 IN | HEART RATE: 66 BPM | DIASTOLIC BLOOD PRESSURE: 86 MMHG | BODY MASS INDEX: 25.79 KG/M2

## 2024-12-20 DIAGNOSIS — F41.9 ANXIETY: Primary | ICD-10-CM

## 2024-12-20 DIAGNOSIS — I10 ESSENTIAL HYPERTENSION: ICD-10-CM

## 2024-12-20 DIAGNOSIS — E78.2 MIXED HYPERLIPIDEMIA: ICD-10-CM

## 2024-12-20 DIAGNOSIS — F41.9 ANXIETY: ICD-10-CM

## 2024-12-20 RX ORDER — LORAZEPAM 1 MG/1
1 TABLET ORAL EVERY 8 HOURS PRN
Qty: 90 TABLET | Refills: 0 | Status: SHIPPED | OUTPATIENT
Start: 2024-12-20

## 2024-12-20 RX ORDER — LORAZEPAM 1 MG/1
1 TABLET ORAL EVERY 8 HOURS PRN
Qty: 90 TABLET | Refills: 0 | OUTPATIENT
Start: 2024-12-20

## 2024-12-20 NOTE — PROGRESS NOTES
Subjective   Chief Complaint:  Medication management    History of Present Illness  This is a 63-year-old female presenting for medication management.    History of anxiety-reports is done well on Ativan, no addictions or signs of tolerance reported.  Reports still having good efficacy and no misuse.    History of hyperlipidemia-reports takes natural supplementation and eats mainly a plant-based diet.    History of high blood pressure-blood pressure 130/68 today-no chest pain palpitations or headaches reported.  Continues HCTZ.    Past Medical, Surgical, Social, and Family History:  Allergies   Allergen Reactions    Bee Venom Nausea And Vomiting    Latex Hives     01/18/2005-Latex unspecified      Levofloxacin Delirium    Poison Ivy Extract Hives    Red Dye #40 (Allura Red) Itching     06/22/2006-Skin itch      Tetracycline Other (See Comments)     01/18/2005-Tetracycline intolerant      Adhesive Tape Rash    Tegaderm Ag Mesh [Silver] Rash    Wasp Venom Rash      Past Medical History:   Diagnosis Date    Anxiety     Arthritis     Depression Sometimes    Dizzy spells     Elevated cholesterol     Endometriosis  told me my endometrial tissue was thickening    Hematuria     Hypertension     Migraine Headaches frequently--not migraines    Osteoarthritis     Pancreatitis     2007    PMB (postmenopausal bleeding)     PONV (postoperative nausea and vomiting) Throw ip    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     Urinary tract infection Throughout the year    Varicella Have had Shingles Shot      Past Surgical History:   Procedure Laterality Date    ADENOIDECTOMY      BILATERAL INSERTION OF EAR TUBES AND ADENOIDECTOMY      COLONOSCOPY N/A 02/18/2022    Procedure: COLONOSCOPY WITH ANESTHESIA;  Surgeon: Bharat Vallejo MD;  Location: [facility] ENDOSCOPY;  Service: Gastroenterology;  Laterality: N/A;  pre screen  post; polyps   Daren Zazueta MD    LAPAROSCOPIC CHOLECYSTECTOMY      SKIN  "LESION EXCISION Left     SCC of the Left upper arm    TONSILLECTOMY      WISDOM TOOTH EXTRACTION        Social History     Socioeconomic History    Marital status: Single   Tobacco Use    Smoking status: Never     Passive exposure: Past    Smokeless tobacco: Never   Vaping Use    Vaping status: Never Used   Substance and Sexual Activity    Alcohol use: Never    Drug use: Never    Sexual activity: Yes     Partners: Male     Birth control/protection: Post-menopausal, None      Family History   Problem Relation Age of Onset    Lung cancer Father     Cancer Father         Lung    Lung cancer Mother     Liver cancer Mother     Cancer Mother         Lung/Liver    Obesity Brother     Heart disease Brother         Stents put in/Obesily over weight    Arthritis Brother     No Known Problems Sister     No Known Problems Son     No Known Problems Daughter     No Known Problems Paternal Grandmother     No Known Problems Maternal Grandmother     No Known Problems Maternal Aunt     No Known Problems Paternal Aunt     No Known Problems Other     BRCA 1/2 Neg Hx     Breast cancer Neg Hx     Colon cancer Neg Hx     Endometrial cancer Neg Hx     Ovarian cancer Neg Hx     Colon polyps Neg Hx     Esophageal cancer Neg Hx     Uterine cancer Neg Hx     Melanoma Neg Hx        Objective   Vital Signs  /86   Pulse 66   Ht 165.1 cm (65\")   PF 98 L/min   BMI 25.79 kg/m²    Physical Exam  Vitals reviewed.   Constitutional:       General: She is not in acute distress.     Appearance: Normal appearance.   Neck:      Vascular: No carotid bruit.   Cardiovascular:      Rate and Rhythm: Normal rate and regular rhythm.      Pulses:           Dorsalis pedis pulses are 2+ on the right side and 2+ on the left side.        Posterior tibial pulses are 2+ on the right side and 2+ on the left side.   Pulmonary:      Effort: Pulmonary effort is normal.      Breath sounds: Normal breath sounds.   Musculoskeletal:      Right lower leg: No edema.      " Left lower leg: No edema.       Assessment & Plan   Assessment & Plan  Diagnoses and all orders for this visit:    1. Anxiety (Primary)  Comments:  Chronic/stable-continue Ativan, new contract signed  -UDS today  Orders:  -     COMPLIANCE DRUG ANALYSIS, NO THC - Urine, Clean Catch  -     LORazepam (ATIVAN) 1 MG tablet; Take 1 tablet by mouth Every 8 (Eight) Hours As Needed for Anxiety.  Dispense: 90 tablet; Refill: 0    2. Essential hypertension  Comments:  Chronic/stable-continue HCTZ    3. Mixed hyperlipidemia  Comments:  Chronic/stable-continue with natural supplementation and diet control          Follow-up:  The patient will Return for 6 month CS medication management.    Records and Results Reviewed:  I reviewed current medications as given by patient and allergy list    : Hybrid Yhat Co- and Dragon Speech Recognition - No recording technology was used in the exam room during encounter.    Electronically signed by EILEEN Thompson, 12/20/24, 3:08 PM CST.

## 2024-12-20 NOTE — TELEPHONE ENCOUNTER
Refill is a duplicate     Rx Refill Note  Requested Prescriptions     Pending Prescriptions Disp Refills    LORazepam (ATIVAN) 1 MG tablet 90 tablet 0     Sig: Take 1 tablet by mouth Every 8 (Eight) Hours As Needed for Anxiety.      Last office visit with prescribing clinician: 12/20/2024   Last telemedicine visit with prescribing clinician: Visit date not found   Next office visit with prescribing clinician: 6/20/2025                         Would you like a call back once the refill request has been completed: [] Yes [] No    If the office needs to give you a call back, can they leave a voicemail: [] Yes [] No    Mica Lomas MA  12/20/24, 12:07 CST

## 2024-12-31 ENCOUNTER — OFFICE VISIT (OUTPATIENT)
Dept: OBSTETRICS AND GYNECOLOGY | Age: 64
End: 2024-12-31
Payer: COMMERCIAL

## 2024-12-31 VITALS
SYSTOLIC BLOOD PRESSURE: 118 MMHG | DIASTOLIC BLOOD PRESSURE: 72 MMHG | BODY MASS INDEX: 27.32 KG/M2 | WEIGHT: 164 LBS | HEIGHT: 65 IN

## 2024-12-31 DIAGNOSIS — Z72.51 UNPROTECTED SEXUAL INTERCOURSE: ICD-10-CM

## 2024-12-31 DIAGNOSIS — Z78.0 MENOPAUSE: ICD-10-CM

## 2024-12-31 DIAGNOSIS — Z01.419 WOMEN'S ANNUAL ROUTINE GYNECOLOGICAL EXAMINATION: Primary | ICD-10-CM

## 2024-12-31 DIAGNOSIS — K59.09 CHRONIC CONSTIPATION: ICD-10-CM

## 2024-12-31 DIAGNOSIS — Z78.9 NON-SMOKER: ICD-10-CM

## 2024-12-31 LAB — DRUGS UR: NORMAL

## 2024-12-31 PROCEDURE — 87591 N.GONORRHOEAE DNA AMP PROB: CPT | Performed by: OBSTETRICS & GYNECOLOGY

## 2024-12-31 PROCEDURE — 87661 TRICHOMONAS VAGINALIS AMPLIF: CPT | Performed by: OBSTETRICS & GYNECOLOGY

## 2024-12-31 PROCEDURE — 99396 PREV VISIT EST AGE 40-64: CPT | Performed by: OBSTETRICS & GYNECOLOGY

## 2024-12-31 PROCEDURE — G0123 SCREEN CERV/VAG THIN LAYER: HCPCS | Performed by: OBSTETRICS & GYNECOLOGY

## 2024-12-31 PROCEDURE — 87491 CHLMYD TRACH DNA AMP PROBE: CPT | Performed by: OBSTETRICS & GYNECOLOGY

## 2024-12-31 PROCEDURE — 87624 HPV HI-RISK TYP POOLED RSLT: CPT | Performed by: OBSTETRICS & GYNECOLOGY

## 2024-12-31 NOTE — PROGRESS NOTES
Subjective   Chief Complaint   Patient presents with    Annual Exam     Pt here today for annual exam. Last pap  normal. Last mammo 24. Pt voices no concerns.        Radha Wolff is a 64 y.o. year old  menopausal female presenting to be seen for her annual exam.  Overall, the patient reports to be feeling well.  Patient has been working out and eating clean - she lost almost 50 pounds last year and has continued to follow her healthy lifestyle chagnes.  The patient denies any vaginal bleeding in the past 12 months. Hot flashes and night sweats  were never a problem.    SEXUAL Hx:  She has become sexually active again this year.  Patient denies any pain or problems.    HEALTH Hx:  She exercises regularly: yes.  The patient reports regular self breast exams: yes.  Patient just had a normal mammogram last month  She has noticed changes in height: not recently, but has lost 2 inches since when she was younger.    No Additional Complaints Reported    The following portions of the patient's history were reviewed and updated as appropriate:problem list, current medications, allergies, past family history, past medical history, past social history, and past surgical history.    Social History    Tobacco Use      Smoking status: Never        Passive exposure: Past      Smokeless tobacco: Never    Review of Systems   Constitutional:  Negative for activity change and unexpected weight change.   HENT:  Negative for congestion.    Respiratory:  Negative for shortness of breath.    Cardiovascular:  Negative for chest pain.   Gastrointestinal:  Positive for constipation (managing with dietary choices, but struggles with this constantly). Negative for abdominal pain, blood in stool and diarrhea.        Colonoscopy normal 2022   Endocrine: Positive for cold intolerance (cold natured, especially since losing weight). Negative for heat intolerance.   Genitourinary:  Negative for difficulty urinating,  "enuresis (only rare BRET, mostly with laughing or sneezing), pelvic pain, vaginal bleeding, vaginal discharge and vaginal pain.   Musculoskeletal:  Positive for back pain. Negative for arthralgias, neck pain and neck stiffness.   Skin:  Negative for rash.   Neurological:  Positive for headaches (only occasionally, seems to correlate to weather changes). Negative for dizziness.   Psychiatric/Behavioral:  Positive for sleep disturbance (takes 3 mg of ativan every night). Negative for dysphoric mood. The patient is not nervous/anxious.          Objective   /72   Ht 165.1 cm (65\")   Wt 74.4 kg (164 lb)   BMI 27.29 kg/m²   Physical Exam  Vitals and nursing note reviewed. Exam conducted with a chaperone present.   Constitutional:       General: She is not in acute distress.     Appearance: She is well-developed.   HENT:      Head: Normocephalic and atraumatic.   Cardiovascular:      Rate and Rhythm: Normal rate and regular rhythm.      Heart sounds: No murmur heard.  Pulmonary:      Effort: Pulmonary effort is normal.      Breath sounds: Normal breath sounds.   Chest:   Breasts:     Right: No inverted nipple or mass.      Left: No inverted nipple or mass.   Abdominal:      General: There is no distension.      Palpations: Abdomen is soft.      Tenderness: There is no abdominal tenderness.   Genitourinary:     General: Normal vulva.      Exam position: Lithotomy position.      Labia:         Right: No tenderness or lesion.         Left: No tenderness or lesion.       Vagina: Normal. No vaginal discharge, tenderness or bleeding.      Cervix: No cervical motion tenderness, discharge or friability.      Adnexa:         Right: No tenderness or fullness.          Left: No tenderness or fullness.        Rectum: Normal.   Musculoskeletal:         General: Normal range of motion.      Cervical back: Normal range of motion and neck supple.   Skin:     General: Skin is warm and dry.   Neurological:      Mental Status: She is " alert and oriented to person, place, and time.   Psychiatric:         Mood and Affect: Mood normal.         Behavior: Behavior normal.         Thought Content: Thought content normal.         Judgment: Judgment normal.            Assessment & Plan    Diagnoses and all orders for this visit:    1. Women's annual routine gynecological examination (Primary): Exam unremarkable.  Patient is still eating healthy and exercising regularly.  She reports regular self breast exam and just recently had a normal mammogram last month.    She is not due for bone density testing since it was normal in 2024.  Routine screening labs with PCP, who is following her lipids and hypertension.  Patient pleased that her cholesterol just fell by 20 points on recent labs.  Patient's screening colonoscopy is up-to-date.  Pelvic exam unremarkable.  Pap smear collected, with addition of STD testing since the patient has had a new sex partner    2. Menopause  -     DEXA Bone Density Axial  -     Mammo Screening Digital Tomosynthesis Bilateral With CAD; Future    3. Chronic constipation: Patient struggles with this constantly, but is managing with diet.  She has tried MiraLAX and Colace in the past, without success.  Patient offered Linzess, but says she would rather continue to manage the problem with her diet    4. Non-smoker    5. Unprotected sexual intercourse       This note was electronically signed.    Kalani Newsome MD  12/31/2024  09:13 CST

## 2025-01-02 LAB
C TRACH RRNA CVX QL NAA+PROBE: NOT DETECTED
GEN CATEG CVX/VAG CYTO-IMP: NORMAL
HPV I/H RISK 4 DNA CVX QL PROBE+SIG AMP: NOT DETECTED
LAB AP CASE REPORT: NORMAL
LAB AP GYN ADDITIONAL INFORMATION: NORMAL
LAB AP GYN OTHER FINDINGS: NORMAL
Lab: NORMAL
N GONORRHOEA RRNA SPEC QL NAA+PROBE: NOT DETECTED
PATH INTERP SPEC-IMP: NORMAL
STAT OF ADQ CVX/VAG CYTO-IMP: NORMAL
TRICHOMONAS VAGINALIS PCR: NOT DETECTED

## 2025-01-20 DIAGNOSIS — F41.9 ANXIETY: ICD-10-CM

## 2025-01-21 RX ORDER — LORAZEPAM 1 MG/1
1 TABLET ORAL EVERY 8 HOURS PRN
Qty: 90 TABLET | Refills: 0 | Status: SHIPPED | OUTPATIENT
Start: 2025-01-21

## 2025-01-21 NOTE — TELEPHONE ENCOUNTER
Rx Refill Note  Requested Prescriptions     Pending Prescriptions Disp Refills    LORazepam (ATIVAN) 1 MG tablet [Pharmacy Med Name: LORAZEPAM 1MG TABLET] 90 tablet 0     Sig: TAKE 1 TABLET BY MOUTH EVERY 8 (EIGHT) HOURS AS NEEDED FOR ANXIETY.      Last office visit with office: 12/20/24  Next office visit with office: 06/20/25    UDS: 12/20/24    DATE OF LAST REFILL: 12/20/24    Controlled Substance Agreement: up to date    [place]         {TIP  Is Refill Pharmacy correct?:  Liset Ramírez MA  01/21/25, 09:44 CST

## 2025-01-28 ENCOUNTER — OFFICE VISIT (OUTPATIENT)
Dept: FAMILY MEDICINE CLINIC | Facility: CLINIC | Age: 65
End: 2025-01-28
Payer: COMMERCIAL

## 2025-01-28 VITALS
HEART RATE: 64 BPM | SYSTOLIC BLOOD PRESSURE: 120 MMHG | HEIGHT: 65 IN | BODY MASS INDEX: 28.32 KG/M2 | TEMPERATURE: 97.2 F | WEIGHT: 170 LBS | OXYGEN SATURATION: 98 % | DIASTOLIC BLOOD PRESSURE: 66 MMHG

## 2025-01-28 DIAGNOSIS — U07.1 COVID-19: ICD-10-CM

## 2025-01-28 DIAGNOSIS — R49.0 HOARSE: ICD-10-CM

## 2025-01-28 DIAGNOSIS — R05.9 COUGH, UNSPECIFIED TYPE: Primary | ICD-10-CM

## 2025-01-28 DIAGNOSIS — R50.9 FEVER, UNSPECIFIED FEVER CAUSE: ICD-10-CM

## 2025-01-28 LAB
EXPIRATION DATE: ABNORMAL
FLUAV AG UPPER RESP QL IA.RAPID: NOT DETECTED
FLUBV AG UPPER RESP QL IA.RAPID: NOT DETECTED
INTERNAL CONTROL: ABNORMAL
Lab: ABNORMAL
SARS-COV-2 AG UPPER RESP QL IA.RAPID: DETECTED

## 2025-01-30 NOTE — PROGRESS NOTES
Subjective   Chief Complaint:  Cough and respiratory congestion    History of Present Illness  The patient is a 64-year-old female who presents today with cough and congestion. She is on day 5 of symptoms.    Past Medical, Surgical, Social, and Family History:  Allergies   Allergen Reactions    Bee Venom Nausea And Vomiting    Latex Hives     01/18/2005-Latex unspecified      Levofloxacin Delirium    Poison Ivy Extract Hives    Red Dye #40 (Allura Red) Itching     06/22/2006-Skin itch      Tetracycline Other (See Comments)     01/18/2005-Tetracycline intolerant      Adhesive Tape Rash    Tegaderm Ag Mesh [Silver] Rash    Wasp Venom Rash      Past Medical History:   Diagnosis Date    Anxiety     Arthritis     Depression Sometimes    Dizzy spells     Elevated cholesterol     Endometriosis  told me my endometrial tissue was thickening    Hematuria     Hypertension     Migraine Headaches frequently--not migraines    Osteoarthritis     Pancreatitis     2007    PMB (postmenopausal bleeding)     PONV (postoperative nausea and vomiting) Throw ip    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     Urinary tract infection Throughout the year    Varicella Have had Shingles Shot      Past Surgical History:   Procedure Laterality Date    ADENOIDECTOMY      BILATERAL INSERTION OF EAR TUBES AND ADENOIDECTOMY      COLONOSCOPY N/A 02/18/2022    Procedure: COLONOSCOPY WITH ANESTHESIA;  Surgeon: Bharat Vallejo MD;  Location: [facility] ENDOSCOPY;  Service: Gastroenterology;  Laterality: N/A;  pre screen  post; polyps   Daren Zazueta MD    LAPAROSCOPIC CHOLECYSTECTOMY      SKIN LESION EXCISION Left     SCC of the Left upper arm    TONSILLECTOMY      WISDOM TOOTH EXTRACTION        Social History     Socioeconomic History    Marital status: Single   Tobacco Use    Smoking status: Never     Passive exposure: Past    Smokeless tobacco: Never   Vaping Use    Vaping status: Never Used   Substance and Sexual  "Activity    Alcohol use: Never    Drug use: Never    Sexual activity: Yes     Partners: Male     Birth control/protection: Post-menopausal, None      Family History   Problem Relation Age of Onset    Lung cancer Father     Cancer Father         Lung    Lung cancer Mother     Liver cancer Mother     Cancer Mother         Lung/Liver    Obesity Brother     Heart disease Brother         Stents put in/Obesily over weight    Arthritis Brother     No Known Problems Sister     No Known Problems Son     No Known Problems Daughter     No Known Problems Paternal Grandmother     No Known Problems Maternal Grandmother     No Known Problems Maternal Aunt     No Known Problems Paternal Aunt     No Known Problems Other     BRCA 1/2 Neg Hx     Breast cancer Neg Hx     Colon cancer Neg Hx     Endometrial cancer Neg Hx     Ovarian cancer Neg Hx     Colon polyps Neg Hx     Esophageal cancer Neg Hx     Uterine cancer Neg Hx     Melanoma Neg Hx        Objective   Vital Signs  /66   Pulse 64   Temp 97.2 °F (36.2 °C) (Infrared)   Ht 165.1 cm (65\")   Wt 77.1 kg (170 lb)   SpO2 98%   BMI 28.29 kg/m²    Physical Exam  Vitals reviewed.   Constitutional:       General: She is not in acute distress.     Appearance: Normal appearance.   Cardiovascular:      Rate and Rhythm: Normal rate and regular rhythm.   Pulmonary:      Effort: Pulmonary effort is normal.      Breath sounds: Normal breath sounds.       Assessment & Plan   Assessment & Plan  1. Respiratory symptoms.  Combination flu COVID test today    2. COVID-19.  Point of care COVID-19 test is positive. Advised home supportive care, quarantine for 5 days from onset with 5 more days of masking recommended.    Follow-up:  The patient will Return for follow-up as needed.    Records and Results Reviewed:  I reviewed current medications as given by patient and allergy list    BMI is >= 25 and <30. (Overweight) The following options were offered after discussion;: Information on " healthy weight added to patient's after visit summary.    : Hybrid HIRO Co- and Dragon Speech Recognition - No recording technology was used in the exam room during encounter.    Electronically signed by EILEEN Thompson, 01/30/25, 7:39 AM CST.

## 2025-02-03 ENCOUNTER — TELEPHONE (OUTPATIENT)
Dept: FAMILY MEDICINE CLINIC | Facility: CLINIC | Age: 65
End: 2025-02-03
Payer: COMMERCIAL

## 2025-02-03 NOTE — TELEPHONE ENCOUNTER
"  Caller: Rdaha Wolff \"CHANTE\"    Relationship to patient: Self    Best call back number: [phone]       Patient is needing: NEEDS TO DISCUSS SOME INSURANCE AND BILLING ISSUES. WANTS TO SPEAK TO  ASAP.         "

## 2025-02-04 NOTE — TELEPHONE ENCOUNTER
Called patient.  Patient stated that she received a bill from [facility] for a lab on 12.20.2024.  Patient was upset bc she only wants [facility] to process her labs.   After reviewing the order/ result.  The lab which is a UDS was sent through [facility] and resulted via [facility].  I am unsure why she would get a bill from [facility].  Patient will be bringing bill by so that we can make a copy and check into it.

## 2025-02-05 NOTE — TELEPHONE ENCOUNTER
Followed up with patient.  Spoke with a supervisor with [facility].  [facility] and [facility] are like sister labs and ran by same company.     I tried multiple times to get a hold of someone at the patient's insurance company to check on the ineligible service code but was on hold and never spoke to anyone. I advised patient that she would need to follow up with insurance company and see why they will not pay for drug screen.   Patient voiced understanding.

## 2025-02-19 DIAGNOSIS — F41.9 ANXIETY: ICD-10-CM

## 2025-02-20 NOTE — TELEPHONE ENCOUNTER
Rx Refill Note  Requested Prescriptions     Pending Prescriptions Disp Refills    LORazepam (ATIVAN) 1 MG tablet 90 tablet 0     Sig: Take 1 tablet by mouth Every 8 (Eight) Hours As Needed for Anxiety.      Last office visit with office: 01/28/25  Next office visit with office: 06/20/25    UDS: none    DATE OF LAST REFILL: 01/21/25    Controlled Substance Agreement: up to date    [facility]         {TIP  Is Refill Pharmacy correct?:  Liset Ramírez MA  02/20/25, 11:15 CST

## 2025-02-21 RX ORDER — LORAZEPAM 1 MG/1
1 TABLET ORAL EVERY 8 HOURS PRN
Qty: 90 TABLET | Refills: 0 | Status: SHIPPED | OUTPATIENT
Start: 2025-02-21

## 2025-03-20 ENCOUNTER — TELEPHONE (OUTPATIENT)
Dept: FAMILY MEDICINE CLINIC | Facility: CLINIC | Age: 65
End: 2025-03-20

## 2025-03-20 NOTE — TELEPHONE ENCOUNTER
"  Caller: Radha Wolff Jeannie \"JEANNIE\"    Relationship: Self    Best call back number: [phone]     What medication are you requesting: DIFLUCAN    What are your current symptoms: WHITE MILKY DISCHARGE    How long have you been experiencing symptoms: A COUPLE OF DAYS    Have you had these symptoms before:    [x] Yes  [] No    Have you been treated for these symptoms before:   [x] Yes  [] No    If a prescription is needed, what is your preferred pharmacy and phone number: [facility] - [place], [place] - [address] [place] [address] [phone] [facility] [phone] FX           "

## 2025-03-21 RX ORDER — FLUCONAZOLE 150 MG/1
150 TABLET ORAL
Qty: 3 TABLET | Refills: 0 | Status: SHIPPED | OUTPATIENT
Start: 2025-03-21

## 2025-03-21 NOTE — TELEPHONE ENCOUNTER
Okay for Diflucan.  Follow-up if no better.    Electronically signed by EILEEN Thompson, 03/21/25, 7:28 AM CDT.

## 2025-03-24 DIAGNOSIS — F41.9 ANXIETY: ICD-10-CM

## 2025-03-24 RX ORDER — LORAZEPAM 1 MG/1
1 TABLET ORAL EVERY 8 HOURS PRN
Qty: 90 TABLET | Refills: 0 | Status: SHIPPED | OUTPATIENT
Start: 2025-03-24

## 2025-03-24 NOTE — TELEPHONE ENCOUNTER
Rx Refill Note  Requested Prescriptions     Pending Prescriptions Disp Refills    LORazepam (ATIVAN) 1 MG tablet 90 tablet 0     Sig: Take 1 tablet by mouth Every 8 (Eight) Hours As Needed for Anxiety.      Last office visit with office: 01/28/2025  Next office visit with office: 06/20/2025    UDS: 12/20/2024    DATE OF LAST REFILL: 02/21/2025    Controlled Substance Agreement: up to date    [facility] OR [facility]: wnl         {TIP  Is Refill Pharmacy correct?:  Cathy Sim MA  03/24/25, 09:40 CDT

## 2025-04-21 RX ORDER — HYDROCHLOROTHIAZIDE 12.5 MG/1
12.5 CAPSULE ORAL DAILY
Qty: 90 CAPSULE | Refills: 1 | Status: SHIPPED | OUTPATIENT
Start: 2025-04-21

## 2025-04-22 DIAGNOSIS — F41.9 ANXIETY: ICD-10-CM

## 2025-04-22 RX ORDER — LORAZEPAM 1 MG/1
1 TABLET ORAL EVERY 8 HOURS PRN
Qty: 90 TABLET | Refills: 0 | Status: SHIPPED | OUTPATIENT
Start: 2025-04-22

## 2025-04-22 NOTE — TELEPHONE ENCOUNTER
Rx Refill Note  Requested Prescriptions     Pending Prescriptions Disp Refills    LORazepam (ATIVAN) 1 MG tablet 90 tablet 0     Sig: Take 1 tablet by mouth Every 8 (Eight) Hours As Needed for Anxiety.      Last office visit with office: 01/28/25  Next office visit with office: 06/20/25    UDS: 12/20/24    DATE OF LAST REFILL: 03/24/25    Controlled Substance Agreement: up to date    [place]         {TIP  Is Refill Pharmacy correct?:  Liset Ramírez MA  04/22/25, 08:42 CDT

## 2025-04-24 ENCOUNTER — TELEPHONE (OUTPATIENT)
Dept: FAMILY MEDICINE CLINIC | Facility: CLINIC | Age: 65
End: 2025-04-24

## 2025-04-24 NOTE — TELEPHONE ENCOUNTER
"    Caller: MananmichaelRadha \"CHANTE\"    Relationship: Self    Best call back number: [phone]     What is the medical concern/diagnosis: LYMPHEDEMA. EVERYTHING IS SWOLLEN    What specialty or service is being requested: LYMPHEDEMA THERAPY     What is the provider, practice or medical service name: [facility]    What is the office location: [place]    What is the office phone number: [phone]    Any additional details: HAS HAD A REFERRAL FOR THIS BEFORE.       "

## 2025-04-24 NOTE — TELEPHONE ENCOUNTER
We have not seen or treated patient for this in over a year, also last referral for this was done in 2023. Patient will need to be seen for referral to be placed.

## 2025-05-20 DIAGNOSIS — F41.9 ANXIETY: ICD-10-CM

## 2025-05-21 RX ORDER — LORAZEPAM 1 MG/1
1 TABLET ORAL EVERY 8 HOURS PRN
Qty: 90 TABLET | Refills: 0 | Status: SHIPPED | OUTPATIENT
Start: 2025-05-21

## 2025-05-21 NOTE — TELEPHONE ENCOUNTER
Rx Refill Note  Requested Prescriptions     Pending Prescriptions Disp Refills    LORazepam (ATIVAN) 1 MG tablet 90 tablet 0     Sig: Take 1 tablet by mouth Every 8 (Eight) Hours As Needed for Anxiety.      Last office visit with office: [id number]  Next office visit with office: [id number]    UDS: [id number]    DATE OF LAST REFILL: [id number]    Controlled Substance Agreement:  [id number]    [facility] OR [facility]: [id number]         {TIP  Is Refill Pharmacy correct?:  Tess Beckett MA  05/21/25, 08:17 CDT

## 2025-06-19 DIAGNOSIS — F41.9 ANXIETY: ICD-10-CM

## 2025-06-20 ENCOUNTER — OFFICE VISIT (OUTPATIENT)
Dept: FAMILY MEDICINE CLINIC | Facility: CLINIC | Age: 65
End: 2025-06-20
Payer: COMMERCIAL

## 2025-06-20 VITALS
SYSTOLIC BLOOD PRESSURE: 98 MMHG | OXYGEN SATURATION: 98 % | HEART RATE: 81 BPM | HEIGHT: 65 IN | DIASTOLIC BLOOD PRESSURE: 56 MMHG | WEIGHT: 175.6 LBS | BODY MASS INDEX: 29.26 KG/M2 | TEMPERATURE: 95.8 F

## 2025-06-20 DIAGNOSIS — I89.0 LYMPHEDEMA: ICD-10-CM

## 2025-06-20 DIAGNOSIS — E78.2 MIXED HYPERLIPIDEMIA: ICD-10-CM

## 2025-06-20 DIAGNOSIS — F41.9 ANXIETY: Primary | ICD-10-CM

## 2025-06-20 DIAGNOSIS — Z12.31 ENCOUNTER FOR SCREENING MAMMOGRAM FOR MALIGNANT NEOPLASM OF BREAST: ICD-10-CM

## 2025-06-20 DIAGNOSIS — I10 ESSENTIAL HYPERTENSION: ICD-10-CM

## 2025-06-20 RX ORDER — LORAZEPAM 1 MG/1
1 TABLET ORAL EVERY 8 HOURS PRN
Qty: 90 TABLET | Refills: 0 | Status: SHIPPED | OUTPATIENT
Start: 2025-06-20

## 2025-06-20 NOTE — PROGRESS NOTES
Subjective   Chief Complaint:  Medication management    History of Present Illness  The patient is a 64-year-old female presenting for regular medication management. She is on Ativan as needed for anxiety, with no misuse. Contracts and drug screen are up-to-date. Anxiety symptoms are stable. Also has a history of high blood pressure, running slightly low today. Reports feeling fine with no faintness or tiredness. It has been about 3 years since she has seen physical therapy for lymphedema. Despite eating well and going to [facility], the lymphedema remains. History of mixed hyperlipidemia. She is due for recheck on labs. She continues omega-3 fatty acids, krill oil, and CoQ10.    She has a history of high blood pressure, which is slightly low today. She reports feeling fine with no faintness or tiredness.    She has not seen a physical therapist for her lymphedema in about 3 years. Despite maintaining a good diet and attending [facility], her lymphedema persists.    She has a history of mixed hyperlipidemia and is due for a recheck on her labs. She continues to take omega-3 fatty acids, krill oil, and CoQ10.    She is on Ativan as needed for anxiety, with no misuse. Her contract and drug screen are up-to-date, and her anxiety symptoms are stable.    Past Medical, Surgical, Social, and Family History:  Allergies   Allergen Reactions    Bee Venom Nausea And Vomiting    Latex Hives     01/18/2005-Latex unspecified      Levofloxacin Delirium    Poison Ivy Extract Hives    Red Dye #40 (Allura Red) Itching     06/22/2006-Skin itch      Tetracycline Other (See Comments)     01/18/2005-Tetracycline intolerant      Adhesive Tape Rash    Tegaderm Ag Mesh [Silver] Rash    Wasp Venom Rash      Past Medical History:   Diagnosis Date    Allergic     Anxiety     Arthritis     Depression Sometimes    Dizzy spells     Elevated cholesterol     Endometriosis  told me my endometrial tissue was thickening    Hematuria     Hypertension      Migraine Headaches frequently--not migraines    Osteoarthritis     Pancreatitis     2007    PMB (postmenopausal bleeding)     PONV (postoperative nausea and vomiting) Throw ip    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     Urinary tract infection Throughout the year    Varicella Have had Shingles Shot      Past Surgical History:   Procedure Laterality Date    ADENOIDECTOMY      BILATERAL INSERTION OF EAR TUBES AND ADENOIDECTOMY      COLONOSCOPY N/A 02/18/2022    Procedure: COLONOSCOPY WITH ANESTHESIA;  Surgeon: Bharat Vallejo MD;  Location: [facility] ENDOSCOPY;  Service: Gastroenterology;  Laterality: N/A;  pre screen  post; polyps   Daren Zazueta MD    LAPAROSCOPIC CHOLECYSTECTOMY      SKIN LESION EXCISION Left     SCC of the Left upper arm    TONSILLECTOMY      WISDOM TOOTH EXTRACTION        Social History     Socioeconomic History    Marital status: Single   Tobacco Use    Smoking status: Never     Passive exposure: Past    Smokeless tobacco: Never   Vaping Use    Vaping status: Never Used   Substance and Sexual Activity    Alcohol use: Never    Drug use: Never    Sexual activity: Yes     Partners: Male     Birth control/protection: Post-menopausal, None      Family History   Problem Relation Age of Onset    Lung cancer Father     Cancer Father         Lung    Lung cancer Mother     Liver cancer Mother         Adenocarcinoma    Cancer Mother         Lung/Liver    Obesity Brother     Heart disease Brother         Stents put in/Obesily over weight    Arthritis Brother     No Known Problems Sister     No Known Problems Son     No Known Problems Daughter     Hypertension Paternal Grandmother         Dad’s Mom    No Known Problems Maternal Grandmother     No Known Problems Maternal Aunt     No Known Problems Paternal Aunt     No Known Problems Other     BRCA 1/2 Neg Hx     Breast cancer Neg Hx     Colon cancer Neg Hx     Endometrial cancer Neg Hx     Ovarian cancer Neg Hx      "Colon polyps Neg Hx     Esophageal cancer Neg Hx     Uterine cancer Neg Hx     Melanoma Neg Hx        Objective   Vital Signs  BP 98/56   Pulse 81   Temp 95.8 °F (35.4 °C) (Infrared)   Ht 165.1 cm (65\")   Wt 79.7 kg (175 lb 9.6 oz)   SpO2 98%   BMI 29.22 kg/m²    Physical Exam  Vitals reviewed.   Constitutional:       General: She is not in acute distress.     Appearance: Normal appearance.   Cardiovascular:      Rate and Rhythm: Normal rate and regular rhythm.      Pulses:           Dorsalis pedis pulses are 2+ on the right side and 2+ on the left side.        Posterior tibial pulses are 2+ on the right side and 2+ on the left side.   Pulmonary:      Effort: Pulmonary effort is normal.      Breath sounds: Normal breath sounds.   Musculoskeletal:      Right lower leg: No edema.      Left lower leg: No edema.       Assessment & Plan   Assessment & Plan  1. Health maintenance.  A mammogram is due in November 2025.    2. Lymphedema.  She reports that despite eating well and going to [facility], the lymphedema remains. A physical therapy evaluation and treatment are recommended.    3. Mixed hyperlipidemia.  She will continue with the same medications, including omega-3 fatty acids, krill oil, and CoQ10. A CBC, CMP, TSH, and lipid panel will be conducted.    4. Essential hypertension.  Chronic, stable. She will continue her current HCTZ regimen. The possibility of dosage reduction was discussed, but she prefers to maintain the current dosage due to previous fluctuations. She feels fine on the current dosage.    5. Chronic anxiety.  Stable. She will continue with Ativan as needed. Drug screen and contract are up-to-date.    Follow-up:  The patient will Return for 6 month CS medication management.    Records and Results Reviewed:  I reviewed current medications as given by patient and allergy list.    : Hybrid HIRO Co- and Dragon Speech Recognition - No recording technology was used in the exam room " during encounter.    Electronically signed by EILEEN Thompson, 06/20/25, 11:02 AM CDT.

## 2025-06-20 NOTE — TELEPHONE ENCOUNTER
Rx Refill Note  Requested Prescriptions     Pending Prescriptions Disp Refills    LORazepam (ATIVAN) 1 MG tablet 90 tablet 0     Sig: Take 1 tablet by mouth Every 8 (Eight) Hours As Needed for Anxiety.      Last office visit with office: 01/28/25  Next office visit with office: 066/20/25    UDS: 12/20/24    DATE OF LAST REFILL: 05/21/25    Controlled Substance Agreement: up to date    [facility] OR [facility]: ZAC Hyman MA  06/20/25, 09:34 CDT

## 2025-06-21 LAB
ALBUMIN SERPL-MCNC: 4.4 G/DL (ref 3.9–4.9)
ALP SERPL-CCNC: 74 IU/L (ref 44–121)
ALT SERPL-CCNC: 9 IU/L (ref 0–32)
AST SERPL-CCNC: 16 IU/L (ref 0–40)
BASOPHILS # BLD AUTO: 0.1 X10E3/UL (ref 0–0.2)
BASOPHILS NFR BLD AUTO: 1 %
BILIRUB SERPL-MCNC: 0.4 MG/DL (ref 0–1.2)
BUN SERPL-MCNC: 20 MG/DL (ref 8–27)
BUN/CREAT SERPL: 24 (ref 12–28)
CALCIUM SERPL-MCNC: 9.9 MG/DL (ref 8.7–10.3)
CHLORIDE SERPL-SCNC: 101 MMOL/L (ref 96–106)
CHOLEST SERPL-MCNC: 205 MG/DL (ref 100–199)
CO2 SERPL-SCNC: 22 MMOL/L (ref 20–29)
CREAT SERPL-MCNC: 0.85 MG/DL (ref 0.57–1)
EGFRCR SERPLBLD CKD-EPI 2021: 77 ML/MIN/1.73
EOSINOPHIL # BLD AUTO: 0.2 X10E3/UL (ref 0–0.4)
EOSINOPHIL NFR BLD AUTO: 3 %
ERYTHROCYTE [DISTWIDTH] IN BLOOD BY AUTOMATED COUNT: 12.7 % (ref 11.7–15.4)
GLOBULIN SER CALC-MCNC: 2.1 G/DL (ref 1.5–4.5)
GLUCOSE SERPL-MCNC: 102 MG/DL (ref 70–99)
HCT VFR BLD AUTO: 45.1 % (ref 34–46.6)
HDLC SERPL-MCNC: 85 MG/DL
HGB BLD-MCNC: 14.2 G/DL (ref 11.1–15.9)
IMM GRANULOCYTES # BLD AUTO: 0 X10E3/UL (ref 0–0.1)
IMM GRANULOCYTES NFR BLD AUTO: 0 %
LDLC SERPL CALC-MCNC: 109 MG/DL (ref 0–99)
LYMPHOCYTES # BLD AUTO: 2.2 X10E3/UL (ref 0.7–3.1)
LYMPHOCYTES NFR BLD AUTO: 34 %
MCH RBC QN AUTO: 29.5 PG (ref 26.6–33)
MCHC RBC AUTO-ENTMCNC: 31.5 G/DL (ref 31.5–35.7)
MCV RBC AUTO: 94 FL (ref 79–97)
MONOCYTES # BLD AUTO: 0.4 X10E3/UL (ref 0.1–0.9)
MONOCYTES NFR BLD AUTO: 6 %
NEUTROPHILS # BLD AUTO: 3.6 X10E3/UL (ref 1.4–7)
NEUTROPHILS NFR BLD AUTO: 56 %
PLATELET # BLD AUTO: 306 X10E3/UL (ref 150–450)
POTASSIUM SERPL-SCNC: 4.3 MMOL/L (ref 3.5–5.2)
PROT SERPL-MCNC: 6.5 G/DL (ref 6–8.5)
RBC # BLD AUTO: 4.82 X10E6/UL (ref 3.77–5.28)
SODIUM SERPL-SCNC: 139 MMOL/L (ref 134–144)
TRIGL SERPL-MCNC: 59 MG/DL (ref 0–149)
TSH SERPL DL<=0.005 MIU/L-ACNC: 2.4 UIU/ML (ref 0.45–4.5)
VLDLC SERPL CALC-MCNC: 11 MG/DL (ref 5–40)
WBC # BLD AUTO: 6.4 X10E3/UL (ref 3.4–10.8)

## 2025-06-27 ENCOUNTER — TELEPHONE (OUTPATIENT)
Dept: FAMILY MEDICINE CLINIC | Facility: CLINIC | Age: 65
End: 2025-06-27
Payer: COMMERCIAL

## 2025-06-27 NOTE — TELEPHONE ENCOUNTER
[facility] called stating they are unable to see patient for physical therapy for lymphedema. [facility] -please advise

## 2025-06-27 NOTE — TELEPHONE ENCOUNTER
I put the referral in for [facility].  It should not have gone there.    Electronically signed by EILEEN Thompson, 06/27/25, 1:28 PM CDT.

## 2025-07-20 DIAGNOSIS — F41.9 ANXIETY: ICD-10-CM

## 2025-07-21 RX ORDER — LORAZEPAM 1 MG/1
1 TABLET ORAL EVERY 8 HOURS PRN
Qty: 90 TABLET | Refills: 0 | Status: SHIPPED | OUTPATIENT
Start: 2025-07-21

## 2025-07-21 NOTE — TELEPHONE ENCOUNTER
Rx Refill Note  Requested Prescriptions     Pending Prescriptions Disp Refills    LORazepam (ATIVAN) 1 MG tablet 90 tablet 0     Sig: Take 1 tablet by mouth Every 8 (Eight) Hours As Needed for Anxiety.      Last office visit with office: 06/20/25  Next office visit with office: 12/22/25    UDS: 12/20/24    DATE OF LAST REFILL: 06/20/25    Controlled Substance Agreement: up to date    [facility] OR [facility]: ZAC Hyman MA  07/21/25, 09:47 CDT

## 2025-07-30 ENCOUNTER — HOSPITAL ENCOUNTER (OUTPATIENT)
Dept: PHYSICAL THERAPY | Facility: HOSPITAL | Age: 65
Setting detail: THERAPIES SERIES
Discharge: HOME | End: 2025-07-30
Payer: COMMERCIAL

## 2025-07-30 DIAGNOSIS — R60.9 LIPEDEMA: ICD-10-CM

## 2025-07-30 DIAGNOSIS — I89.0 LYMPHEDEMA OF LEFT LOWER EXTREMITY: Primary | ICD-10-CM

## 2025-07-30 PROCEDURE — 97162 PT EVAL MOD COMPLEX 30 MIN: CPT | Performed by: PHYSICAL THERAPIST

## 2025-07-30 NOTE — THERAPY EVALUATION
Physical Therapy Initial Evaluation and Plan of Care  [address] [place] [address]    Patient: Radha Wolff             : 1960  Today's Date: 2025  Referring practitioner: EILEEN Thompson  Date of Initial Visit: 2025  Patient seen for 18 sessions    Visit Diagnoses:    ICD-10-CM ICD-9-CM   1. Lymphedema of left lower extremity  I89.0 457.1   2. 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     Urinary tract infection Throughout the year    Varicella Have had Shingles Shot     Past Surgical History:   Procedure Laterality Date    ADENOIDECTOMY      BILATERAL INSERTION OF EAR TUBES AND ADENOIDECTOMY      COLONOSCOPY N/A 2022    Procedure: COLONOSCOPY WITH ANESTHESIA;  Surgeon: Bharat Vallejo MD;  Location: [facility];  Service: Gastroenterology;  Laterality: N/A;  pre screen  post; polyps   Daren Zazueta MD    LAPAROSCOPIC CHOLECYSTECTOMY      SKIN LESION EXCISION Left     SCC of the Left upper arm    TONSILLECTOMY      WISDOM TOOTH EXTRACTION         SUBJECTIVE     Subjective Evaluation    History of Present Illness  Date of onset: 2025  Mechanism of injury: Jeannie has a history of lipedema and lymphedema.  She had multiple illnesses with flu and COVID-19 in January and had more swelling return that she has not been able to get back off.    Subjective comment: She had multiple illnesses at the beginning of this year and it has messed with  "her lymphedema.  Patient Occupation:  Quality of life: good    Social Support  Lives with: alone             Outcome Measure:   LLIS = 50%       OBJECTIVE     Objective      07/30/25 0900   Subjective Pain   Able to rate subjective pain? yes   Pre-Treatment Pain Level 0   LLIS - Physical Concerns   The amount of pain associated with my lymphedema is: 1   The amount of limb heaviness associated with my lymphedema is: 2   The amount of skin tightness associated with my lymphedema is: 3   The size of my swollen limb(s) seems: 4   Lymphedema affects the movement of my swollen limb(s): 2   The strength in my swollen limb(s) is: 2   LLIS - Psychosocial Concerns   Lymphedema affects my body image (i.e., \"how I think I look\"). 4   Lymphedema affects my socializing with others. 4   Lymphedema affects my intimate relations with spouse or partner (rate 0 if not applicable 4   Lymphedema \"gets me down\" (i.e., depression, frustration, or anger) 4   I must rely on others for help due to my lymphedema. 0   I know what to do to manage my lymphedema 0   LLIS - Functional Concerns   Lymphedema affects my ability to perform self-care activities (i.e. eating, dressing, hygiene) 0   Lymphedema affects my ability to perform routine home or work-related activities. 0   Lymphedema affects my performance of preferred leisure activities. 0   Lymphedema affects proper fit of clothing/shoes 4   Lymphedema affects my sleep 0   Lymphedema Measurements   Measurement Type(s) Circumferential   Circumferential Areas Lower extremities   BLE Circumferential (cm)   Measurement Location 1 BOT   Left 1 20.6 cm   Right 1 20.5 cm   Measurement Location 2 +10   Left 2 25.8 cm   Right 2 24.4 cm   Measurement Location 3 +10   Left 3 30.4 cm   Right 3 29 cm   Measurement Location 4 +10   Left 4 38.5 cm   Right 4 38.1 cm   Measurement Location 5 +10   Left 5 41 cm   Right 5 40.7 cm   Measurement Location 6 +10   Left 6 40.3 cm   Right 6 42.8 " cm   Measurement Location 7 +10   Left 7 52.3 cm   Right 7 54.9 cm   Measurement Location 8 +10   Left 8 62.3 cm   Right 8 62.5 cm   LLE Circumferential Total 311.2 cm   RLE Circumferential Total 312.9 cm   Lymphedema Life Impact Scale Totals   A.  Total Q1 - Q17 (Do not include Q18) 34   B.  Total number of questions answered (Q1-Q17) 17   C. Divide A by B 2   D. Multiple C by 25 50       Therapy Education/Self Care 14925   Education offered today Reviewed plan of care   Medbride Code    Ongoing HEP   Continue wearing her compression and exercise   Timed Minutes        Total Timed Treatment:        mins  Total Time of Visit:            40   mins    ASSESSMENT/PLAN     Goals                                          Progress Note due by 8/29/2025                                                      Recert due by 10/27/2025   LTG by: 12 weeks Comments Date Status   Patient will be independent with HEP for lymphatic drainage and posture.      Patient will be independent with self MLD for lymphatic drainage.      Patient will have appropriate compression garments for lymphedema maintenance      Patient will have an improvement in LLIS score of at least 15%.      Patient will be independent with comprehensive maintenance program shoulder mobility, functional strengthening and lymphedema prevention.           Assessment & Plan       Assessment  Assessment details: Jeannie is a 64-year-old female with lymphedema and lipedema affecting mainly her lower extremities.  She had had further weight loss and limb reduction after we discharged her almost 2 years ago.  Unfortunately, she had multiple respiratory infections at the beginning of the year and had a 30 pound weight gain and increasing her swelling.  She would benefit from a course of lymphedema treatment to address this.  Prognosis: good    Plan  Therapy options: will be seen for skilled therapy services  Frequency: 1x week  Duration in weeks: 12  Treatment plan discussed  with: patient      Anticipated CPT codes: Therapeutic Exercise 86263, Manual Therapy 20875, and Self Care/Home Management 57343    SIGNATURE: Yojana Carrasco, PT, DPT, IAME LORD License #: [id number]  Electronically Signed on 8/1/2025    Initial Certification  Certification Period: 8/1/2025 through 10/29/2025  I certify that the therapy services are furnished while this patient is under my care.  The services outlined above are required by this patient, and will be reviewed every 90 days.     PHYSICIAN: Jean Haddad APRN (NPI: [id number])    Signature:_________________________________________DATE: ________      Please sign and return via fax to [phone].   Thank you so much for letting us work with Radha. I appreciate your letting us work with your patients. If you have any questions or concerns, please don't hesitate to contact me.          [address] [id number]  [phone]

## 2025-08-07 ENCOUNTER — HOSPITAL ENCOUNTER (OUTPATIENT)
Dept: PHYSICAL THERAPY | Facility: HOSPITAL | Age: 65
Setting detail: THERAPIES SERIES
Discharge: HOME | End: 2025-08-07
Payer: COMMERCIAL

## 2025-08-07 DIAGNOSIS — R60.9 LIPEDEMA: ICD-10-CM

## 2025-08-07 DIAGNOSIS — I89.0 LYMPHEDEMA OF LEFT LOWER EXTREMITY: Primary | ICD-10-CM

## 2025-08-07 PROCEDURE — 97140 MANUAL THERAPY 1/> REGIONS: CPT

## 2025-08-13 RX ORDER — HYDROCHLOROTHIAZIDE 12.5 MG/1
12.5 CAPSULE ORAL DAILY
Qty: 90 CAPSULE | Refills: 1 | Status: SHIPPED | OUTPATIENT
Start: 2025-08-13

## 2025-08-20 DIAGNOSIS — F41.9 ANXIETY: ICD-10-CM

## 2025-08-20 RX ORDER — LORAZEPAM 1 MG/1
1 TABLET ORAL EVERY 8 HOURS PRN
Qty: 90 TABLET | Refills: 0 | Status: SHIPPED | OUTPATIENT
Start: 2025-08-20

## (undated) DEVICE — TBG SMPL FLTR LINE NASL 02/C02 A/ BX/100

## (undated) DEVICE — THE SINGLE USE ETRAP – POLYP TRAP IS USED FOR SUCTION RETRIEVAL OF ENDOSCOPICALLY REMOVED POLYPS.: Brand: ETRAP

## (undated) DEVICE — CUFF,BP,DISP,1 TUBE,ADULT,HP: Brand: MEDLINE

## (undated) DEVICE — MASK,OXYGEN,MED CONC,ADLT,7' TUB, UC: Brand: PENDING

## (undated) DEVICE — SENSR O2 OXIMAX FNGR A/ 18IN NONSTR

## (undated) DEVICE — YANKAUER,BULB TIP WITH VENT: Brand: ARGYLE

## (undated) DEVICE — THE CHANNEL CLEANING BRUSH IS A NYLON FLEXI BRUSH ATTACHED TO A FLEXIBLE PLASTIC SHEATH DESIGNED TO SAFELY REMOVE DEBRIS FROM FLEXIBLE ENDOSCOPES.

## (undated) DEVICE — Device: Brand: DEFENDO AIR/WATER/SUCTION AND BIOPSY VALVE

## (undated) DEVICE — SNAR POLYP SENSATION MICRO OVL 13 240X40